# Patient Record
Sex: FEMALE | Race: ASIAN | NOT HISPANIC OR LATINO | URBAN - METROPOLITAN AREA
[De-identification: names, ages, dates, MRNs, and addresses within clinical notes are randomized per-mention and may not be internally consistent; named-entity substitution may affect disease eponyms.]

---

## 2020-08-11 ENCOUNTER — INPATIENT (INPATIENT)
Facility: HOSPITAL | Age: 60
LOS: 7 days | Discharge: HOME | DRG: 501 | End: 2020-08-19
Attending: INTERNAL MEDICINE | Admitting: INTERNAL MEDICINE
Payer: COMMERCIAL

## 2020-08-11 VITALS
HEIGHT: 62 IN | DIASTOLIC BLOOD PRESSURE: 92 MMHG | TEMPERATURE: 99 F | SYSTOLIC BLOOD PRESSURE: 134 MMHG | HEART RATE: 101 BPM | WEIGHT: 119.05 LBS | OXYGEN SATURATION: 95 % | RESPIRATION RATE: 18 BRPM

## 2020-08-11 DIAGNOSIS — M62.82 RHABDOMYOLYSIS: ICD-10-CM

## 2020-08-11 DIAGNOSIS — E13.9 OTHER SPECIFIED DIABETES MELLITUS WITHOUT COMPLICATIONS: ICD-10-CM

## 2020-08-11 DIAGNOSIS — E78.5 HYPERLIPIDEMIA, UNSPECIFIED: ICD-10-CM

## 2020-08-11 DIAGNOSIS — I10 ESSENTIAL (PRIMARY) HYPERTENSION: ICD-10-CM

## 2020-08-11 LAB
ANION GAP SERPL CALC-SCNC: 16 MMOL/L — SIGNIFICANT CHANGE UP (ref 5–17)
APPEARANCE UR: CLEAR — SIGNIFICANT CHANGE UP
APTT BLD: 30.8 SEC — SIGNIFICANT CHANGE UP (ref 27.5–35.5)
BASE EXCESS BLDV CALC-SCNC: 3.6 MMOL/L — HIGH (ref -2–2)
BASOPHILS # BLD AUTO: 0.02 K/UL — SIGNIFICANT CHANGE UP (ref 0–0.2)
BASOPHILS NFR BLD AUTO: 0.2 % — SIGNIFICANT CHANGE UP (ref 0–2)
BILIRUB UR-MCNC: NEGATIVE — SIGNIFICANT CHANGE UP
BUN SERPL-MCNC: 8 MG/DL — SIGNIFICANT CHANGE UP (ref 7–23)
CA-I SERPL-SCNC: 1.16 MMOL/L — SIGNIFICANT CHANGE UP (ref 1.12–1.3)
CALCIUM SERPL-MCNC: 9.5 MG/DL — SIGNIFICANT CHANGE UP (ref 8.4–10.5)
CHLORIDE BLDV-SCNC: 99 MMOL/L — SIGNIFICANT CHANGE UP (ref 96–108)
CHLORIDE SERPL-SCNC: 95 MMOL/L — LOW (ref 96–108)
CK MB BLD-MCNC: 1.7 % — SIGNIFICANT CHANGE UP (ref 0–3.5)
CK MB CFR SERPL CALC: 265.6 NG/ML — HIGH (ref 0–3.8)
CK SERPL-CCNC: CRITICAL HIGH U/L (ref 25–170)
CO2 BLDV-SCNC: 30 MMOL/L — SIGNIFICANT CHANGE UP (ref 22–30)
CO2 SERPL-SCNC: 26 MMOL/L — SIGNIFICANT CHANGE UP (ref 22–31)
COLOR SPEC: COLORLESS — SIGNIFICANT CHANGE UP
CREAT SERPL-MCNC: 0.32 MG/DL — LOW (ref 0.5–1.3)
CRP SERPL-MCNC: 3.47 MG/DL — HIGH (ref 0–0.4)
DIFF PNL FLD: ABNORMAL
EOSINOPHIL # BLD AUTO: 0.04 K/UL — SIGNIFICANT CHANGE UP (ref 0–0.5)
EOSINOPHIL NFR BLD AUTO: 0.5 % — SIGNIFICANT CHANGE UP (ref 0–6)
ERYTHROCYTE [SEDIMENTATION RATE] IN BLOOD: 86 MM/HR — HIGH (ref 0–20)
GAS PNL BLDV: 138 MMOL/L — SIGNIFICANT CHANGE UP (ref 135–145)
GAS PNL BLDV: SIGNIFICANT CHANGE UP
GLUCOSE BLDC GLUCOMTR-MCNC: 139 MG/DL — HIGH (ref 70–99)
GLUCOSE BLDV-MCNC: 157 MG/DL — HIGH (ref 70–99)
GLUCOSE SERPL-MCNC: 159 MG/DL — HIGH (ref 70–99)
GLUCOSE UR QL: NEGATIVE — SIGNIFICANT CHANGE UP
HCO3 BLDV-SCNC: 28 MMOL/L — SIGNIFICANT CHANGE UP (ref 21–29)
HCT VFR BLD CALC: 41.3 % — SIGNIFICANT CHANGE UP (ref 34.5–45)
HCT VFR BLDA CALC: 46 % — SIGNIFICANT CHANGE UP (ref 39–50)
HGB BLD CALC-MCNC: 14.8 G/DL — SIGNIFICANT CHANGE UP (ref 11.5–15.5)
HGB BLD-MCNC: 13.6 G/DL — SIGNIFICANT CHANGE UP (ref 11.5–15.5)
IMM GRANULOCYTES NFR BLD AUTO: 0.2 % — SIGNIFICANT CHANGE UP (ref 0–1.5)
INR BLD: 0.97 RATIO — SIGNIFICANT CHANGE UP (ref 0.88–1.16)
KETONES UR-MCNC: SIGNIFICANT CHANGE UP
LACTATE BLDV-MCNC: 1.9 MMOL/L — SIGNIFICANT CHANGE UP (ref 0.7–2)
LEUKOCYTE ESTERASE UR-ACNC: NEGATIVE — SIGNIFICANT CHANGE UP
LYMPHOCYTES # BLD AUTO: 1.03 K/UL — SIGNIFICANT CHANGE UP (ref 1–3.3)
LYMPHOCYTES # BLD AUTO: 12 % — LOW (ref 13–44)
MAGNESIUM SERPL-MCNC: 2.3 MG/DL — SIGNIFICANT CHANGE UP (ref 1.6–2.6)
MCHC RBC-ENTMCNC: 29.9 PG — SIGNIFICANT CHANGE UP (ref 27–34)
MCHC RBC-ENTMCNC: 32.9 GM/DL — SIGNIFICANT CHANGE UP (ref 32–36)
MCV RBC AUTO: 90.8 FL — SIGNIFICANT CHANGE UP (ref 80–100)
MONOCYTES # BLD AUTO: 0.14 K/UL — SIGNIFICANT CHANGE UP (ref 0–0.9)
MONOCYTES NFR BLD AUTO: 1.6 % — LOW (ref 2–14)
NEUTROPHILS # BLD AUTO: 7.34 K/UL — SIGNIFICANT CHANGE UP (ref 1.8–7.4)
NEUTROPHILS NFR BLD AUTO: 85.5 % — HIGH (ref 43–77)
NITRITE UR-MCNC: NEGATIVE — SIGNIFICANT CHANGE UP
NRBC # BLD: 0 /100 WBCS — SIGNIFICANT CHANGE UP (ref 0–0)
PCO2 BLDV: 45 MMHG — SIGNIFICANT CHANGE UP (ref 35–50)
PH BLDV: 7.42 — SIGNIFICANT CHANGE UP (ref 7.35–7.45)
PH UR: 6.5 — SIGNIFICANT CHANGE UP (ref 5–8)
PHOSPHATE SERPL-MCNC: 3.2 MG/DL — SIGNIFICANT CHANGE UP (ref 2.5–4.5)
PLATELET # BLD AUTO: 406 K/UL — HIGH (ref 150–400)
PO2 BLDV: 46 MMHG — HIGH (ref 25–45)
POTASSIUM BLDV-SCNC: 3.8 MMOL/L — SIGNIFICANT CHANGE UP (ref 3.5–5.3)
POTASSIUM SERPL-MCNC: 3.7 MMOL/L — SIGNIFICANT CHANGE UP (ref 3.5–5.3)
POTASSIUM SERPL-SCNC: 3.7 MMOL/L — SIGNIFICANT CHANGE UP (ref 3.5–5.3)
PROT UR-MCNC: NEGATIVE — SIGNIFICANT CHANGE UP
PROTHROM AB SERPL-ACNC: 11.6 SEC — SIGNIFICANT CHANGE UP (ref 10.6–13.6)
RBC # BLD: 4.55 M/UL — SIGNIFICANT CHANGE UP (ref 3.8–5.2)
RBC # FLD: 12.5 % — SIGNIFICANT CHANGE UP (ref 10.3–14.5)
SAO2 % BLDV: 81 % — SIGNIFICANT CHANGE UP (ref 67–88)
SARS-COV-2 RNA SPEC QL NAA+PROBE: SIGNIFICANT CHANGE UP
SODIUM SERPL-SCNC: 137 MMOL/L — SIGNIFICANT CHANGE UP (ref 135–145)
SP GR SPEC: 1.01 — LOW (ref 1.01–1.02)
UROBILINOGEN FLD QL: NEGATIVE — SIGNIFICANT CHANGE UP
WBC # BLD: 8.59 K/UL — SIGNIFICANT CHANGE UP (ref 3.8–10.5)
WBC # FLD AUTO: 8.59 K/UL — SIGNIFICANT CHANGE UP (ref 3.8–10.5)

## 2020-08-11 PROCEDURE — 99223 1ST HOSP IP/OBS HIGH 75: CPT | Mod: GC

## 2020-08-11 PROCEDURE — 99285 EMERGENCY DEPT VISIT HI MDM: CPT

## 2020-08-11 PROCEDURE — 71045 X-RAY EXAM CHEST 1 VIEW: CPT | Mod: 26

## 2020-08-11 RX ORDER — SODIUM CHLORIDE 9 MG/ML
1000 INJECTION INTRAMUSCULAR; INTRAVENOUS; SUBCUTANEOUS
Refills: 0 | Status: DISCONTINUED | OUTPATIENT
Start: 2020-08-11 | End: 2020-08-12

## 2020-08-11 RX ORDER — DEXTROSE 50 % IN WATER 50 %
15 SYRINGE (ML) INTRAVENOUS ONCE
Refills: 0 | Status: DISCONTINUED | OUTPATIENT
Start: 2020-08-11 | End: 2020-08-19

## 2020-08-11 RX ORDER — GLUCAGON INJECTION, SOLUTION 0.5 MG/.1ML
1 INJECTION, SOLUTION SUBCUTANEOUS ONCE
Refills: 0 | Status: DISCONTINUED | OUTPATIENT
Start: 2020-08-11 | End: 2020-08-19

## 2020-08-11 RX ORDER — SODIUM CHLORIDE 9 MG/ML
1000 INJECTION, SOLUTION INTRAVENOUS
Refills: 0 | Status: DISCONTINUED | OUTPATIENT
Start: 2020-08-11 | End: 2020-08-19

## 2020-08-11 RX ORDER — SODIUM CHLORIDE 9 MG/ML
1000 INJECTION INTRAMUSCULAR; INTRAVENOUS; SUBCUTANEOUS ONCE
Refills: 0 | Status: COMPLETED | OUTPATIENT
Start: 2020-08-11 | End: 2020-08-11

## 2020-08-11 RX ORDER — INSULIN LISPRO 100/ML
VIAL (ML) SUBCUTANEOUS
Refills: 0 | Status: DISCONTINUED | OUTPATIENT
Start: 2020-08-11 | End: 2020-08-18

## 2020-08-11 RX ORDER — SODIUM BICARBONATE 1 MEQ/ML
650 SYRINGE (ML) INTRAVENOUS
Refills: 0 | Status: COMPLETED | OUTPATIENT
Start: 2020-08-11 | End: 2020-08-13

## 2020-08-11 RX ORDER — DEXTROSE 50 % IN WATER 50 %
12.5 SYRINGE (ML) INTRAVENOUS ONCE
Refills: 0 | Status: DISCONTINUED | OUTPATIENT
Start: 2020-08-11 | End: 2020-08-19

## 2020-08-11 RX ORDER — TIMOLOL 0.5 %
1 DROPS OPHTHALMIC (EYE)
Qty: 0 | Refills: 0 | DISCHARGE

## 2020-08-11 RX ORDER — DEXTROSE 50 % IN WATER 50 %
25 SYRINGE (ML) INTRAVENOUS ONCE
Refills: 0 | Status: DISCONTINUED | OUTPATIENT
Start: 2020-08-11 | End: 2020-08-19

## 2020-08-11 RX ORDER — INSULIN LISPRO 100/ML
VIAL (ML) SUBCUTANEOUS AT BEDTIME
Refills: 0 | Status: DISCONTINUED | OUTPATIENT
Start: 2020-08-11 | End: 2020-08-18

## 2020-08-11 RX ORDER — TRAVOPROST 0.04 MG/ML
1 SOLUTION/ DROPS OPHTHALMIC
Qty: 0 | Refills: 0 | DISCHARGE

## 2020-08-11 RX ORDER — PANTOPRAZOLE SODIUM 20 MG/1
40 TABLET, DELAYED RELEASE ORAL
Refills: 0 | Status: DISCONTINUED | OUTPATIENT
Start: 2020-08-11 | End: 2020-08-19

## 2020-08-11 RX ADMIN — SODIUM CHLORIDE 1000 MILLILITER(S): 9 INJECTION INTRAMUSCULAR; INTRAVENOUS; SUBCUTANEOUS at 13:52

## 2020-08-11 RX ADMIN — SODIUM CHLORIDE 125 MILLILITER(S): 9 INJECTION INTRAMUSCULAR; INTRAVENOUS; SUBCUTANEOUS at 23:09

## 2020-08-11 NOTE — H&P ADULT - PROBLEM SELECTOR PLAN 1
IV fluids   seen by rheumatology  -likely combination of ( herbal remedy ( black fungus) and statin she is on   -will start sod bicab tab bid x 2 days

## 2020-08-11 NOTE — ED PROVIDER NOTE - ATTENDING CONTRIBUTION TO CARE
RGUJRAL 61yo f hx listed presents with proximal muscle weakness and fatigue x 1 month. Patient had outpt testing done and noted to have elevated CPK and liver enzymes. Denies any fever, chills. No cp/palp/sob/abd pain. No new meds. As per PMD pt has held her oral meds x 1 week.   on exam, Patient is awake, alert and oriented x 3.  Patient is well appearing and in no acute distress.  NCAT, PERRL, EOMI.  Neck is supple, No LAD.  Lungs are CTA B/L,+S1S2 no murmurs,  Abdomen:Soft nd/nt+bs no rebound or guarding.  Extremity no edema or calf tender.  Skin with no rash.  Neuro CN3-12 intact. Strength 5/5 in upper and lower extremities. Nml Sensation.Gait normal.   Labs reviewed, ddx myositis. Check labs, IVF and Rheum consult.

## 2020-08-11 NOTE — ED ADULT NURSE NOTE - OBJECTIVE STATEMENT
60 y female comes in sent in by PMD for elevated liver enzymes.  pt for couple of months has had general weakness her arms and legs.  Pt  able to ambulate but feels like collapsing Pt has not passed out or fallen.   Pt is followed by her MD and has been testing her blood liver enzymes cardiac enzymes.  Denies any other symptoms except being tired [facility] 60[facility] PM[facility] DM and HTN presenting with 1 month fatigue and proximal muscle weakness with elevated AST/ALT and CK. Pt with difficulty raising arms above head, hip flexion, standing from seated position, and exercise intolerance. Recent outpt labs obtained 7/29/20 notable for elevated CK to 16,000s and AST/ALT to 300s. Obtained repeat labs on 8/3/20 with consistent elevations in CK, AST/ALT, and liver U/S with increased hepatic echogenicity. Advised by PMD to come to [facility] for further evaluation. Denies muscle pain, red urine and dysuria, dry and thin hair, cold intolerance. Denies sick contacts, recent travel, smoking, drinking, drug use, herbal remedies. Denies fever, HA, dizziness, CP, SOB, abdominal pain, n/v, rashes, swelling. All other ROS negative at this time. IVL placed and pending further orderes susan

## 2020-08-11 NOTE — ED PROVIDER NOTE - PHYSICAL EXAMINATION
VITALS:   T(C): 37.2 (08-11-20 @ 12:19), Max: 37.2 (08-11-20 @ 12:19)  HR: 89 (08-11-20 @ 13:49) (89 - 101)  BP: 135/89 (08-11-20 @ 13:49) (134/92 - 135/89)  RR: 16 (08-11-20 @ 13:49) (16 - 18)  SpO2: 99% (08-11-20 @ 13:49) (95% - 99%)    GENERAL: NAD, lying in bed comfortably  HEAD:  Atraumatic, normocephalic  EYES: EOMI, PERRLA, conjunctiva and sclera clear  ENT: Moist mucous membranes  NECK: Supple, no JVD  HEART: Regular rate and rhythm, no murmurs, rubs, or gallops  LUNGS: Unlabored respirations.  Clear to auscultation bilaterally, no crackles, wheezing, or rhonchi  ABDOMEN: Soft, nontender, nondistended, no organomegaly, +BS  EXTREMITIES: 2+ peripheral pulses bilaterally. No clubbing, cyanosis, or edema  NERVOUS SYSTEM:  A&Ox3, strength 4/5 in b/l proximal UE and LE, 5/5 in b/l distal UE and LE  SKIN: No rashes or lesions

## 2020-08-11 NOTE — H&P ADULT - NSHPPHYSICALEXAM_GEN_ALL_CORE
pt. seen and examined, NAD    Vital Signs Last 24 Hrs  T(C): 36.8 (11 Aug 2020 20:20), Max: 37.2 (11 Aug 2020 12:19)  T(F): 98.3 (11 Aug 2020 20:20), Max: 98.9 (11 Aug 2020 12:19)  HR: 89 (11 Aug 2020 20:20) (85 - 101)  BP: 111/71 (11 Aug 2020 20:20) (111/71 - 137/83)  BP(mean): --  RR: 18 (11 Aug 2020 20:20) (15 - 18)  SpO2: 98% (11 Aug 2020 20:20) (95% - 99%)    heent: nc/at, no paloor  neck: supple, no JVD  lungs: b/l clear, no w/r/r  heart: s1s2 nml  abd: soft, NABS, NT/ND  ext: no e/c/c, pulses 2+  neuro: aaox3 , no focal deficit

## 2020-08-11 NOTE — ED PROVIDER NOTE - OBJECTIVE STATEMENT
60F PMH DM and HTN presenting with 1 month fatigue and proximal muscle weakness with elevated AST/ALT and CK. Pt with difficulty raising arms above head, hip flexion, standing from seated position, and exercise intolerance. Recent outpt labs obtained 7/29/20 notable for elevated CK to 16,000s and AST/ALT to 300s. Obtained repeat labs on 8/3/20 with consistent elevations in CK, AST/ALT, and liver U/S with increased hepatic echogenicity. Advised by PMD to come to [facility] for further evaluation. Denies muscle pain, red urine and dysuria, dry and thin hair, cold intolerance. Denies sick contacts, recent travel, smoking, drinking, drug use, herbal remedies. Denies fever, HA, dizziness, CP, SOB, abdominal pain, n/v, rashes, swelling. All other ROS negative at this time.

## 2020-08-11 NOTE — ED PROVIDER NOTE - NS ED ROS FT
General: (+) weakness, (+) fatigue, no change in wt  HEENT: No congestion, no blurry vision, no odynophagia, no rhinorrhea, no ear pain, no throat pain  Respiratory: No cough, no shortness of breath  Cardiac: No chest pain, no palpitations  GI: No abdominal pain, (+) chronic loose stools x3 months, no vomiting, no nausea, no constipation  : No dysuria, no hematuria  MSK: No swelling in extremities, no arthralgias, no back pain  Neuro: No headache, no dizziness

## 2020-08-11 NOTE — PATIENT PROFILE ADULT - VISION (WITH CORRECTIVE LENSES IF THE PATIENT USUALLY WEARS THEM):
Anxiety    Asthma    Bipolar 1 disorder  no therapy  COPD (chronic obstructive pulmonary disease)  emphysema  Depressed    Diverticulosis    Gall bladder disease    Hypertension    Kidney stone    Lipoma  left abdomen / hip  Lipoma of abdominal wall  left lateral Normal vision: sees adequately in most situations; can see medication labels, newsprint

## 2020-08-11 NOTE — H&P ADULT - HISTORY OF PRESENT ILLNESS
Ms. Vieira is a 61y/o F PMHx of T2DM, HLD, HTN, and glaucoma presenting with 1 month fatigue and proximal muscle weakness with elevated AST/ALT and CK. Pt endorses difficulty raising arms above head, difficulty raising her legs, difficulty standing from seated position, and exercise intolerance. Pt was evaluated by her PCP w/ o/p labs obtained 7/29/20 notable for elevated CK to 16,249 and /. Repeat labs on 8/3/20 with CK 16,190, /, and liver U/S with increased hepatic echogenicity. Advised by PCP to come to [facility] for further evaluation. Denies fever/chills, muscle pain, red urine/dysuria, dry and thin hair, or cold intolerance. Denies sick contacts, recent travel, smoking, drinking, drug use, herbal remedies. Denies HA, dizziness, CP, SOB, abdominal pain, n/v, rashes, swelling.     She does endorse eating boiled fungus/mushrooms from the supermarket ~4w/a. Also reports chronic internment epigastric pains including sensation of something stuck in her throat and/or stomach for which she had EGD in June. Endorses ~15 lb unintentional weightloss over the last ~3m.     she says she takes only one time metformin and not bid and alsoher BP is acually low , but her doctor started on losartan to protect her kidneys from diabetes
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
## 2020-08-11 NOTE — H&P ADULT - NSHPLABSRESULTS_GEN_ALL_CORE
13.6   8.59  )-----------( 406      ( 11 Aug 2020 13:59 )             41.3     08-11    137  |  95<L>  |  8   ----------------------------<  159<H>  3.7   |  26  |  0.32<L>    Ca    9.5      11 Aug 2020 13:59  Phos  3.2     08-11  Mg     2.3     08-11

## 2020-08-11 NOTE — PATIENT PROFILE ADULT - NSPROEDALEARNPREF_GEN_A_NUR
Question about rx pain medication intervals, medication prescribed by ED, unable to answer question within scope of practice, found a 24hr pharmacy close to caller and transferred caller to speak to 24hr pharmacist.    Reason for Disposition   Caller has medication question about med not prescribed by PCP and triager unable to answer question (e.g., compatibility with other med, storage)    Protocols used: MEDICATION QUESTION CALL-ADULT-
verbal instruction

## 2020-08-11 NOTE — ED ADULT NURSE NOTE - CHPI ED NUR SYMPTOMS NEG
no pain/no vomiting/no decreased eating/drinking/no dizziness/no fever/no nausea/no chills/no tingling

## 2020-08-11 NOTE — CONSULT NOTE ADULT - ASSESSMENT
59y/o F PMHx of T2DM, HLD, HTN, and glaucoma presenting with 1 month fatigue and proximal muscle weakness with elevated AST/ALT and CK. 61y/o F PMHx of T2DM, HLD, HTN, and glaucoma presenting with 1 month fatigue and proximal muscle weakness with elevated AST/ALT and CK. Rheumatology consulted for possible polymyositis  - ESR and UA currently pending  - Would obtain serologies for polymyositis/dermatomyositis: LILI, anti-Ro/SSA, anti-La/SSB, anti-ribonucleoprotein (RNP), DOROTHY/anti-Sm, and anti-Rosa-1   - Case to be discussed with attending    Lawrence Walters  Internal Medicine, PGY3  [facility] [phone]  [facility] [address] 61y/o F PMHx of T2DM, HLD, HTN, and glaucoma presenting with 1 month fatigue and proximal muscle weakness with elevated AST/ALT and CK. Rheumatology consulted for possible polymyositis  - ESR and UA currently pending  - Would obtain serologies for polymyositis/dermatomyositis: LILI, anti-Ro/SSA, anti-La/SSB, anti-ribonucleoprotein (RNP), DOROTHY/anti-Sm, and MyoMarker panel 3  - Case to be discussed with attending    Lawrence Walters  Internal Medicine, PGY3  [facility] [phone]  [facility] [address] 61y/o F PMHx of T2DM, HLD, HTN, and glaucoma presenting with 1 month fatigue and proximal muscle weakness with elevated AST/ALT and CK. Rheumatology consulted for possible polymyositis  - ESR currently pending, CRP 3.47, CK ~16k  - Would obtain serologies for polymyositis/dermatomyositis: LILI, anti-Ro/SSA, anti-La/SSB, anti-ribonucleoprotein (RNP), DOROTHY/anti-Sm, and MyoMarker panel 3  - Would obtain MRI of thigh muscles to evaluate for muscle inflammation/edema   - Case to be discussed with attending    Lawrence Walters  Internal Medicine, PGY3  [facility] [phone]  [facility] [address] 61y/o F PMHx of T2DM, HLD, HTN, and glaucoma presenting with 1 month fatigue and proximal muscle weakness with elevated AST/ALT and CK. 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   - Elevated CK concerning for statin induced myositis vs viral myositis vs polymyositis or dermatomyositis   - ESR currently pending, CRP 3.47, CK ~16k  - Would obtain serologies for polymyositis/dermatomyositis: LILI, anti-Ro/SSA, anti-La/SSB, anti-ribonucleoprotein (RNP), DOROTHY/anti-Sm, anti-Rosa-1, and MyoMarker panel 3  - Please obtain HMG-CoA reductase Ab  - Would obtain MRI of thigh muscles to evaluate for muscle inflammation/edema   - Case to be discussed with attending    Lawrence Walters  Internal Medicine, PGY3  [facility] [phone]  [facility] [address] 61y/o F PMHx of T2DM, HLD, HTN, and glaucoma presenting with 1 month fatigue and proximal muscle weakness with elevated AST/ALT and CK. 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   - Elevated CK concerning for statin induced myositis vs viral myositis vs polymyositis or dermatomyositis   - ESR currently pending, CRP 3.47, CK ~16k  - Would obtain serologies for polymyositis/dermatomyositis: LILI, anti-Ro/SSA, anti-La/SSB, anti-ribonucleoprotein (RNP), DOROTHY/anti-Sm, anti-Rosa-1, and MyoMarker panel 3  - Please obtain HMG-CoA reductase Ab  - Would obtain MRI of thigh muscles to evaluate for muscle inflammation/edema   - Given polymyositis association with paraneoplastic syndromes would check thyroid U/S and CT chest/abdomen/pelvis   - Case to be discussed with attending    Lawrence Walters  Internal Medicine, PGY3  [facility] [phone]  [facility] [address] 59y/o F PMHx of T2DM, HLD, HTN, and glaucoma presenting with 1 month fatigue and proximal muscle weakness with elevated AST/ALT and CK. 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   - Elevated CK concerning for statin induced myositis vs viral myositis vs polymyositis or dermatomyositis   - ESR currently pending, CRP 3.47, CK ~16k  - Would obtain serologies for polymyositis/dermatomyositis: LILI, C3, C4 anti-Ro/SSA, anti-La/SSB, anti-ribonucleoprotein (RNP), DOROTHY/anti-Sm, anti-Rosa-1, and MyoMarker panel 3  - Please obtain HMG-CoA reductase Ab  - Would obtain MRI of thigh muscles to evaluate for muscle inflammation/edema   - Given polymyositis association with paraneoplastic syndromes would check thyroid U/S and CT chest/abdomen/pelvis to r/o malignancy     Case to be discussed with Dr. Villeda  Rheumatology will follow  Lawrence Walters  Internal Medicine, PGY3  [facility] [phone]  [facility] [address]

## 2020-08-11 NOTE — ED PROVIDER NOTE - CLINICAL SUMMARY MEDICAL DECISION MAKING FREE TEXT BOX
60F PMH DM, HTN presenting with 1mo fatigue and recent elevated AST/ALT 300s and CK to 50194s, proximal muscle weakness, concerning for polymyositis vs rhabdomyolysis. Will obtain labs, coags, ESR/CRP, CKMB, Mg, Phos, UA CXR. Admit for further w/u of abnormal labs.

## 2020-08-12 ENCOUNTER — TRANSCRIPTION ENCOUNTER (OUTPATIENT)
Age: 60
End: 2020-08-12

## 2020-08-12 LAB
A1C WITH ESTIMATED AVERAGE GLUCOSE RESULT: 7 % — HIGH (ref 4–5.6)
ALBUMIN SERPL ELPH-MCNC: 3.7 G/DL — SIGNIFICANT CHANGE UP (ref 3.3–5)
ALP SERPL-CCNC: 76 U/L — SIGNIFICANT CHANGE UP (ref 40–120)
ALT FLD-CCNC: 305 U/L — HIGH (ref 10–45)
ANION GAP SERPL CALC-SCNC: 15 MMOL/L — SIGNIFICANT CHANGE UP (ref 5–17)
ANTI-RIBONUCLEAR PROTEIN: <0.2 AI — SIGNIFICANT CHANGE UP
AST SERPL-CCNC: 173 U/L — HIGH (ref 10–40)
BILIRUB SERPL-MCNC: 0.4 MG/DL — SIGNIFICANT CHANGE UP (ref 0.2–1.2)
BUN SERPL-MCNC: 8 MG/DL — SIGNIFICANT CHANGE UP (ref 7–23)
C3 SERPL-MCNC: 156 MG/DL — SIGNIFICANT CHANGE UP (ref 81–157)
C4 SERPL-MCNC: 34 MG/DL — SIGNIFICANT CHANGE UP (ref 13–39)
CALCIUM SERPL-MCNC: 9.4 MG/DL — SIGNIFICANT CHANGE UP (ref 8.4–10.5)
CHLORIDE SERPL-SCNC: 99 MMOL/L — SIGNIFICANT CHANGE UP (ref 96–108)
CHOLEST SERPL-MCNC: 228 MG/DL — HIGH (ref 10–199)
CK SERPL-CCNC: CRITICAL HIGH U/L (ref 25–170)
CO2 SERPL-SCNC: 27 MMOL/L — SIGNIFICANT CHANGE UP (ref 22–31)
CREAT SERPL-MCNC: 0.37 MG/DL — LOW (ref 0.5–1.3)
ENA SM AB FLD QL: <0.2 AI — SIGNIFICANT CHANGE UP
ESTIMATED AVERAGE GLUCOSE: 154 MG/DL — HIGH (ref 68–114)
GLUCOSE BLDC GLUCOMTR-MCNC: 117 MG/DL — HIGH (ref 70–99)
GLUCOSE BLDC GLUCOMTR-MCNC: 127 MG/DL — HIGH (ref 70–99)
GLUCOSE BLDC GLUCOMTR-MCNC: 137 MG/DL — HIGH (ref 70–99)
GLUCOSE BLDC GLUCOMTR-MCNC: 141 MG/DL — HIGH (ref 70–99)
GLUCOSE SERPL-MCNC: 132 MG/DL — HIGH (ref 70–99)
HCT VFR BLD CALC: 40.5 % — SIGNIFICANT CHANGE UP (ref 34.5–45)
HCV AB S/CO SERPL IA: 0.08 S/CO — SIGNIFICANT CHANGE UP (ref 0–0.99)
HCV AB SERPL-IMP: SIGNIFICANT CHANGE UP
HDLC SERPL-MCNC: 40 MG/DL — LOW
HGB BLD-MCNC: 12.7 G/DL — SIGNIFICANT CHANGE UP (ref 11.5–15.5)
LIPID PNL WITH DIRECT LDL SERPL: 159 MG/DL — HIGH
MCHC RBC-ENTMCNC: 29.5 PG — SIGNIFICANT CHANGE UP (ref 27–34)
MCHC RBC-ENTMCNC: 31.4 GM/DL — LOW (ref 32–36)
MCV RBC AUTO: 94.2 FL — SIGNIFICANT CHANGE UP (ref 80–100)
NRBC # BLD: 0 /100 WBCS — SIGNIFICANT CHANGE UP (ref 0–0)
PLATELET # BLD AUTO: 371 K/UL — SIGNIFICANT CHANGE UP (ref 150–400)
POTASSIUM SERPL-MCNC: 4.1 MMOL/L — SIGNIFICANT CHANGE UP (ref 3.5–5.3)
POTASSIUM SERPL-SCNC: 4.1 MMOL/L — SIGNIFICANT CHANGE UP (ref 3.5–5.3)
PROT SERPL-MCNC: 6.4 G/DL — SIGNIFICANT CHANGE UP (ref 6–8.3)
RAPID RVP RESULT: SIGNIFICANT CHANGE UP
RBC # BLD: 4.3 M/UL — SIGNIFICANT CHANGE UP (ref 3.8–5.2)
RBC # FLD: 12.7 % — SIGNIFICANT CHANGE UP (ref 10.3–14.5)
SODIUM SERPL-SCNC: 141 MMOL/L — SIGNIFICANT CHANGE UP (ref 135–145)
TOTAL CHOLESTEROL/HDL RATIO MEASUREMENT: 5.6 RATIO — SIGNIFICANT CHANGE UP (ref 3.3–7.1)
TRIGL SERPL-MCNC: 139 MG/DL — SIGNIFICANT CHANGE UP (ref 10–149)
WBC # BLD: 7.7 K/UL — SIGNIFICANT CHANGE UP (ref 3.8–10.5)
WBC # FLD AUTO: 7.7 K/UL — SIGNIFICANT CHANGE UP (ref 3.8–10.5)

## 2020-08-12 PROCEDURE — 99231 SBSQ HOSP IP/OBS SF/LOW 25: CPT

## 2020-08-12 PROCEDURE — 71260 CT THORAX DX C+: CPT | Mod: 26

## 2020-08-12 PROCEDURE — 74177 CT ABD & PELVIS W/CONTRAST: CPT | Mod: 26

## 2020-08-12 RX ORDER — SODIUM CHLORIDE 9 MG/ML
1000 INJECTION INTRAMUSCULAR; INTRAVENOUS; SUBCUTANEOUS
Refills: 0 | Status: DISCONTINUED | OUTPATIENT
Start: 2020-08-12 | End: 2020-08-13

## 2020-08-12 RX ADMIN — SODIUM CHLORIDE 75 MILLILITER(S): 9 INJECTION INTRAMUSCULAR; INTRAVENOUS; SUBCUTANEOUS at 12:12

## 2020-08-12 RX ADMIN — Medication 650 MILLIGRAM(S): at 05:35

## 2020-08-12 RX ADMIN — Medication 650 MILLIGRAM(S): at 17:02

## 2020-08-12 RX ADMIN — SODIUM CHLORIDE 125 MILLILITER(S): 9 INJECTION INTRAMUSCULAR; INTRAVENOUS; SUBCUTANEOUS at 05:35

## 2020-08-12 RX ADMIN — PANTOPRAZOLE SODIUM 40 MILLIGRAM(S): 20 TABLET, DELAYED RELEASE ORAL at 05:35

## 2020-08-12 NOTE — DISCHARGE NOTE PROVIDER - NSDCMRMEDTOKEN_GEN_ALL_CORE_FT
atorvastatin 10 mg oral tablet: 1 tab(s) orally once a day    NOTE: Patient havent taken for 1 week due to medical condition  losartan 25 mg oral tablet: 1 tab(s) orally once a day    NOTE: Patient havent taken for 1 week due to medical condition  metFORMIN 500 mg oral tablet, extended release: 1 tab(s) orally once a day    NOTE: RX SIG- 1 tab every 12 hours- patient havent taken for 1 week due to medical condition  timolol maleate 0.5% ophthalmic solution: 1 drop(s) to each affected eye once a day  travoprost 0.004% ophthalmic solution: 1 drop(s) to each affected eye once a day (in the evening) atorvastatin 10 mg oral tablet: 1 tab(s) orally once a day    NOTE: Patient havent taken for 1 week due to medical condition  Bactrim  mg-160 mg oral tablet: 1 tab(s) orally Monday, Wednesday, and Friday  losartan 25 mg oral tablet: 1 tab(s) orally once a day    NOTE: Patient havent taken for 1 week due to medical condition  metFORMIN 500 mg oral tablet, extended release: 1 tab(s) orally once a day    NOTE: RX SIG- 1 tab every 12 hours- patient havent taken for 1 week due to medical condition  pantoprazole 40 mg oral delayed release tablet: 1 tab(s) orally once a day (before a meal)  predniSONE 50 mg oral tablet: 1 tab(s) orally once a day  timolol maleate 0.5% ophthalmic solution: 1 drop(s) to each affected eye once a day  travoprost 0.004% ophthalmic solution: 1 drop(s) to each affected eye once a day (in the evening) Bactrim  mg-160 mg oral tablet: 1 tab(s) orally Monday, Wednesday, and Friday  metFORMIN 500 mg oral tablet, extended release: 1 tab(s) orally 2 times a day - 1 tab every 12 hours-  pantoprazole 40 mg oral delayed release tablet: 1 tab(s) orally once a day (before a meal)  predniSONE 10 mg oral tablet: 5 tab(s) orally once a day ;  follow up with rheumatology on tapering dose next week  timolol maleate 0.5% ophthalmic solution: 1 drop(s) to each affected eye once a day  travoprost 0.004% ophthalmic solution: 1 drop(s) to each affected eye once a day (in the evening)

## 2020-08-12 NOTE — DISCHARGE NOTE PROVIDER - CARE PROVIDER_API CALL
GLENNA PARADA  Internal Medicine  [address] [address] [address] [place] [address]  Phone: [phone]  Fax: [phone]  Follow Up Time: KARMAGLENNA REGALADO  Internal Medicine  [address] [place] [address]  Phone: [phone]  Fax: [phone]  Follow Up Time:     Luna Villeda (MD; MPH)  Internal Medicine; Rheumatology  [address], [address]  [place] [address]  Phone: [phone]  Fax: [phone]  Scheduled Appointment: 08/26/2020 KARMAGLENNA REGALADO  Internal Medicine  [address] [address] [address] [place] [address]  Phone: [phone]  Fax: [phone]  Follow Up Time:     Luna Villeda; MPH)  Internal Medicine; Rheumatology  [address], [address]  [place] [address]  Phone: [phone]  Fax: [phone]  Scheduled Appointment: 08/26/2020    Pranav Lowery  MEDICINE  [address] [place] [address]  Phone: [phone]  Fax: [phone]  Follow Up Time: 1 week

## 2020-08-12 NOTE — PROGRESS NOTE ADULT - PROBLEM SELECTOR PLAN 1
IV fluids   seen by rheumatology : Ct scan chest , abd/pelvis pending  -likely combination of ( herbal remedy ( black fungus) and statin she is on   -will start sod bicab tab bid x 2 days

## 2020-08-12 NOTE — DISCHARGE NOTE PROVIDER - CARE PROVIDERS DIRECT ADDRESSES
,DirectAddress_Unknown ,DirectAddress_Unknown,[email or web address] ,DirectAddress_Unknown,[email or web address]

## 2020-08-12 NOTE — DISCHARGE NOTE PROVIDER - PROVIDER TOKENS
PROVIDER:[TOKEN:[[id number]]] PROVIDER:[TOKEN:[[id number]]],PROVIDER:[TOKEN:[[id number]],SCHEDULEDAPPT:[08/26/2020]] PROVIDER:[TOKEN:[[id number]]],PROVIDER:[TOKEN:[[id number]],SCHEDULEDAPPT:[08/26/2020]],PROVIDER:[TOKEN:[[id number]],FOLLOWUP:[1 week]]

## 2020-08-12 NOTE — CONSULT NOTE ADULT - SUBJECTIVE AND OBJECTIVE BOX
CARDIOLOGY CONSULT - Dr. Cosme     CHIEF COMPLAINT: elevated CK     HPI:  61y/o F PMHx of T2DM, HLD, HTN, and glaucoma presenting with 1 month fatigue and proximal muscle weakness with elevated AST/ALT and CK. Pt endorses difficulty raising arms above head, difficulty raising her legs, difficulty standing from seated position, and exercise intolerance. Pt was evaluated by her PCP w/ o/p labs obtained 7/29/20 notable for elevated CK to 16,249 and /. Repeat labs on 8/3/20 with CK 16,190, /, and liver U/S with increased hepatic echogenicity. Advised by PCP to come to [facility] for further evaluation. Denies fever/chills, muscle pain, red urine/dysuria, dry and thin hair, or cold intolerance. Denies sick contacts, recent travel, smoking, drinking, drug use, herbal remedies. Denies HA, dizziness, CP, SOB, abdominal pain, n/v, rashes, swelling.     Pt seen and examined, Patient denies any chest pain, dyspnea, palpitations, cough, syncope, edema, exertional symptoms, nausea, abdominal pain, fever, chills,  or rash.  Denies any history of CAD, MI, valve disease, cardiomyopathy, or congenital heart disease. Pt. still complains of weakness, IVF continued.     PAST MEDICAL & SURGICAL HISTORY:  No pertinent past medical history  No significant past surgical history          PREVIOUS DIAGNOSTIC TESTING:    [ ] Echocardiogram:   [ ]  Catheterization:  [ ] Stress Test:  	    MEDICATIONS:  MEDICATIONS  (STANDING):  dextrose 5%. 1000 milliLiter(s) (50 mL/Hr) IV Continuous <Continuous>  dextrose 50% Injectable 12.5 Gram(s) IV Push once  dextrose 50% Injectable 25 Gram(s) IV Push once  dextrose 50% Injectable 25 Gram(s) IV Push once  insulin lispro (HumaLOG) corrective regimen sliding scale   SubCutaneous three times a day before meals  insulin lispro (HumaLOG) corrective regimen sliding scale   SubCutaneous at bedtime  pantoprazole    Tablet 40 milliGRAM(s) Oral before breakfast  sodium bicarbonate 650 milliGRAM(s) Oral two times a day  sodium chloride 0.9%. 1000 milliLiter(s) (75 mL/Hr) IV Continuous <Continuous>      FAMILY HISTORY:  No pertinent family history in first degree relatives      SOCIAL HISTORY:    [ x] Non-smoker  [ ] Smoker  [ ] Alcohol    Allergies    No Known Allergies    Intolerances    	    REVIEW OF SYSTEMS:  CONSTITUTIONAL: No fever, weight loss, + fatigue  EYES: No eye pain, visual disturbances, or discharge  ENMT:  No difficulty hearing, tinnitus, vertigo; No sinus or throat pain  NECK: No pain or stiffness  RESPIRATORY: No cough, wheezing, chills or hemoptysis; No Shortness of Breath  CARDIOVASCULAR: No chest pain, palpitations, passing out, dizziness, or leg swelling  GASTROINTESTINAL: No abdominal or epigastric pain. No nausea, vomiting, or hematemesis; No diarrhea or constipation. No melena or hematochezia.  GENITOURINARY: No dysuria, frequency, hematuria, or incontinence  NEUROLOGICAL: No headaches, memory loss, loss of strength, numbness, or tremors  SKIN: No itching, burning, rashes, or lesions   	    [x ] All others negative	  [ ] Unable to obtain    PHYSICAL EXAM:  T(C): 36.8 (08-12-20 @ 10:01), Max: 37.2 (08-11-20 @ 12:19)  HR: 85 (08-12-20 @ 10:01) (75 - 101)  BP: 126/80 (08-12-20 @ 10:01) (111/71 - 137/83)  RR: 18 (08-12-20 @ 10:01) (15 - 18)  SpO2: 96% (08-12-20 @ 10:01) (95% - 99%)  Wt(kg): --  I&O's Summary    11 Aug 2020 07:01  -  12 Aug 2020 07:00  --------------------------------------------------------  IN: 1115 mL / OUT: 0 mL / NET: 1115 mL    12 Aug 2020 07:01  -  12 Aug 2020 12:07  --------------------------------------------------------  IN: 240 mL / OUT: 0 mL / NET: 240 mL        Appearance: Normal	  Psychiatry: A & O x 3, Mood & affect appropriate  HEENT:   Normal oral mucosa, PERRL, EOMI	  Lymphatic: No lymphadenopathy  Cardiovascular: Normal S1 S2,RRR, No JVD, No murmurs  Respiratory: Lungs clear to auscultation	  Gastrointestinal:  Soft, Non-tender, + BS	  Skin: No rashes, No ecchymoses, No cyanosis	  Neurologic: Non-focal  Extremities: Normal range of motion, No clubbing, cyanosis or edema  Vascular: Peripheral pulses palpable 2+ bilaterally    TELEMETRY: 	    ECG:  NSR 94bpm   RADIOLOGY: < from: Xray Chest 1 View- PORTABLE-Urgent (08.11.20 @ 15:40) >  IMPRESSION:  No acute cardiopulmonary findings.          < end of copied text >    OTHER: 	  	  LABS:	 	    CARDIAC MARKERS:                                  12.7   7.70  )-----------( 371      ( 12 Aug 2020 06:39 )             40.5     08-12    141  |  99  |  8   ----------------------------<  132<H>  4.1   |  27  |  0.37<L>    Ca    9.4      12 Aug 2020 06:39  Phos  3.2     08-11  Mg     2.3     08-11    TPro  6.4  /  Alb  3.7  /  TBili  0.4  /  DBili  x   /  AST  173<H>  /  ALT  305<H>  /  AlkPhos  76  08-12    PT/INR - ( 11 Aug 2020 13:59 )   PT: 11.6 sec;   INR: 0.97 ratio         PTT - ( 11 Aug 2020 13:59 )  PTT:30.8 sec  proBNP:   Lipid Profile:   HgA1c:   TSH:

## 2020-08-12 NOTE — DISCHARGE NOTE PROVIDER - HOSPITAL COURSE
61y/o F PMHx of T2DM, HLD, HTN, and glaucoma presenting with 1 month fatigue and proximal muscle weakness with elevated AST/ALT and CK admitted with Rhabdomyolysis.     Rhabdomyolysis, ck down trending, continue to trend;    seen by rheumatology; w/u in progress 59y/o F PMHx of T2DM, HLD, HTN, and glaucoma presenting with 1 month fatigue and proximal muscle weakness with elevated AST/ALT and CK admitted with Rhabdomyolysis.     Rhabdomyolysis, ck down trending, continue to trend with IVF;    seen by rheumatology; w/u in progress;     concerning for myositis. had IVIGx1 dose on 8/13/20; seen by surgery and had  left quadriceps biopsy on 8/18/20. follow up biopsy result    per surgery, Patient can remove dressing on 8/20 morning. Steri strips to remain in place for one week.    continue prednisone as per rheumatology and follow up appointment with rheumatology on 8/26/2; call office to confirm time and rheum will decide on tapering preddnisone dose 61y/o F PMHx of T2DM, HLD, HTN, and glaucoma presenting with 1 month fatigue and proximal muscle weakness with elevated AST/ALT and CK admitted with Rhabdomyolysis.     Rhabdomyolysis, ck down trending, continue to trend with IVF;    seen by rheumatology;     -MRI showing myositis and muscle edema    -thyroid US with bl small thyroid nodule , CT A/P unremarkable     . had IVIGx1 dose on 8/13/20; seen by surgery and had  left quadriceps biopsy on 8/18/20. follow up biopsy result    per surgery, Patient can remove dressing on 8/20 morning. Steri strips to remain in place for one week.    continue prednisone 50mg po daily and bactrim DS 1 tab three times a wee for pcp prophylaxis as per rheumatology and follow up appointment with rheumatology on 8/26/2; call office to confirm time and rheum will decide on tapering prednisone;    pt is cleared by md for discharge home . 59y/o F PMHx of T2DM, HLD, HTN, and glaucoma presenting with 1 month fatigue and proximal muscle weakness with elevated AST/ALT and CK admitted with Rhabdomyolysis.     Rhabdomyolysis, ck down trending, continue to trend with IVF;    seen by rheumatology;     -MRI showing myositis and muscle edema    -thyroid US with bl small thyroid nodule , CT A/P unremarkable     . had IVIG;  seen by surgery and had  left quadriceps biopsy on 8/18/20. follow up biopsy result    per surgery, Patient can remove dressing on 8/20 morning. Steri strips to remain in place for one week.    continue prednisone 50mg po daily and bactrim DS 1 tab three times a wee for pcp prophylaxis as per rheumatology and follow up appointment with rheumatology on 8/26/2; call office to confirm time and rheum will decide on tapering prednisone;    pt is cleared by md for discharge home .

## 2020-08-12 NOTE — CHART NOTE - NSCHARTNOTEFT_GEN_A_CORE
Called by RN to report  abnormal lab result from AM lab-CPK 13,353; on IVF for rhabdo; Rheum on board; w/u in progress  Patient is a 60y old  Female who presents with a chief complaint of proximal muscle weakness and elevated CK (11 Aug 2020 21:05)    HPI:  Ms. Vieira is a 61y/o F PMHx of T2DM, HLD, HTN, and glaucoma presenting with 1 month fatigue and proximal muscle weakness with elevated AST/ALT and CK. Pt endorses difficulty raising arms above head, difficulty raising her legs, difficulty standing from seated position, and exercise intolerance. Pt was evaluated by her PCP w/ o/p labs obtained 7/29/20 notable for elevated CK to 16,249 and /. Repeat labs on 8/3/20 with CK 16,190, /, and liver U/S with increased hepatic echogenicity. Advised by PCP to come to [facility] for further evaluation. Denies fever/chills, muscle pain, red urine/dysuria, dry and thin hair, or cold intolerance. Denies sick contacts, recent travel, smoking, drinking, drug use, herbal remedies. Denies HA, dizziness, CP, SOB, abdominal pain, n/v, rashes, swelling.     She does endorse eating boiled fungus/mushrooms from the supermarket ~4w/a. Also reports chronic internment epigastric pains including sensation of something stuck in her throat and/or stomach for which she had EGD in June. Endorses ~15 lb unintentional weightloss over the last ~3m.     she says she takes only one time metformin and not bid and alsoher BP is acually low , but her doctor started on losartan to protect her kidneys from diabetes (11 Aug 2020 21:05)    Vital Signs Last 24 Hrs  T(C): 36.7 (12 Aug 2020 05:30), Max: 37.2 (11 Aug 2020 12:19)  T(F): 98 (12 Aug 2020 05:30), Max: 98.9 (11 Aug 2020 12:19)  HR: 75 (12 Aug 2020 05:30) (75 - 101)  BP: 114/68 (12 Aug 2020 05:30) (111/71 - 137/83)  BP(mean): --  RR: 18 (12 Aug 2020 05:30) (15 - 18)  SpO2: 98% (12 Aug 2020 05:30) (95% - 99%)                        12.7   7.70  )-----------( 371      ( 12 Aug 2020 06:39 )             40.5     08-12    141  |  99  |  8   ----------------------------<  132<H>  4.1   |  27  |  0.37<L>    Ca    9.4      12 Aug 2020 06:39  Phos  3.2     08-11  Mg     2.3     08-11    TPro  6.4  /  Alb  3.7  /  TBili  0.4  /  DBili  x   /  AST  173<H>  /  ALT  305<H>  /  AlkPhos  76  08-12    PT/INR - ( 11 Aug 2020 13:59 )   PT: 11.6 sec;   INR: 0.97 ratio         PTT - ( 11 Aug 2020 13:59 )  PTT:30.8 sec        A/P Called by RN to report  abnormal lab result from AM lab-CPK 13,353; on IVF for rhabdo; Rheum on board; w/u in progress  pt feels weak. denies pain/SOB/CP  afebrile  Patient is a 60y old  Female who presents with a chief complaint of proximal muscle weakness and elevated CK (11 Aug 2020 21:05)    HPI:  Ms. Vieira is a 61y/o F PMHx of T2DM, HLD, HTN, and glaucoma presenting with 1 month fatigue and proximal muscle weakness with elevated AST/ALT and CK. Pt endorses difficulty raising arms above head, difficulty raising her legs, difficulty standing from seated position, and exercise intolerance. Pt was evaluated by her PCP w/ o/p labs obtained 7/29/20 notable for elevated CK to 16,249 and /. Repeat labs on 8/3/20 with CK 16,190, /, and liver U/S with increased hepatic echogenicity. Advised by PCP to come to [facility] for further evaluation. Denies fever/chills, muscle pain, red urine/dysuria, dry and thin hair, or cold intolerance. Denies sick contacts, recent travel, smoking, drinking, drug use, herbal remedies. Denies HA, dizziness, CP, SOB, abdominal pain, n/v, rashes, swelling.     She does endorse eating boiled fungus/mushrooms from the supermarket ~4w/a. Also reports chronic internment epigastric pains including sensation of something stuck in her throat and/or stomach for which she had EGD in June. Endorses ~15 lb unintentional weightloss over the last ~3m.     she says she takes only one time metformin and not bid and alsoher BP is acually low , but her doctor started on losartan to protect her kidneys from diabetes (11 Aug 2020 21:05)    Vital Signs Last 24 Hrs  T(C): 36.7 (12 Aug 2020 05:30), Max: 37.2 (11 Aug 2020 12:19)  T(F): 98 (12 Aug 2020 05:30), Max: 98.9 (11 Aug 2020 12:19)  HR: 75 (12 Aug 2020 05:30) (75 - 101)  BP: 114/68 (12 Aug 2020 05:30) (111/71 - 137/83)  BP(mean): --  RR: 18 (12 Aug 2020 05:30) (15 - 18)  SpO2: 98% (12 Aug 2020 05:30) (95% - 99%)                        12.7   7.70  )-----------( 371      ( 12 Aug 2020 06:39 )             40.5     08-12    141  |  99  |  8   ----------------------------<  132<H>  4.1   |  27  |  0.37<L>    Ca    9.4      12 Aug 2020 06:39  Phos  3.2     08-11  Mg     2.3     08-11    TPro  6.4  /  Alb  3.7  /  TBili  0.4  /  DBili  x   /  AST  173<H>  /  ALT  305<H>  /  AlkPhos  76  08-12    PT/INR - ( 11 Aug 2020 13:59 )   PT: 11.6 sec;   INR: 0.97 ratio         PTT - ( 11 Aug 2020 13:59 )  PTT:30.8 sec    pt is AxOx4  lungs-clear  CVS-S1S2 RRR  GI- abdomen soft, NT, bowel sounds present  Ext; able move all extremities; no edema    AP    61y/o F PMHx of T2DM, HLD, HTN, and glaucoma presenting with 1 month fatigue and proximal muscle weakness with elevated AST/ALT and CK admitted with Rhabdomyolysis.   ck down trending,;  called by RN pt with  abnormal lab test from AM lab-elevated CPK; on IVNS  d/w attending MD; continue IVF NS with 75ml per hour  f/u CPK/CMP  rheumatology on board and w/u in progress  updated pt plan of care.  Jody Mendoza(NP)  3 [facility], [phone] Called by RN to report  abnormal lab result from AM lab-CPK 13,353; on IVF for rhabdo; Rheum on board; w/u in progress  pt feels tired and weak denies pain/SOB/CP  afebrile  Patient is a 60y old  Female who presents with a chief complaint of proximal muscle weakness and elevated CK (11 Aug 2020 21:05)    HPI:  Ms. Vieira is a 61y/o F PMHx of T2DM, HLD, HTN, and glaucoma presenting with 1 month fatigue and proximal muscle weakness with elevated AST/ALT and CK. Pt endorses difficulty raising arms above head, difficulty raising her legs, difficulty standing from seated position, and exercise intolerance. Pt was evaluated by her PCP w/ o/p labs obtained 7/29/20 notable for elevated CK to 16,249 and /. Repeat labs on 8/3/20 with CK 16,190, /, and liver U/S with increased hepatic echogenicity. Advised by PCP to come to [facility] for further evaluation. Denies fever/chills, muscle pain, red urine/dysuria, dry and thin hair, or cold intolerance. Denies sick contacts, recent travel, smoking, drinking, drug use, herbal remedies. Denies HA, dizziness, CP, SOB, abdominal pain, n/v, rashes, swelling.     She does endorse eating boiled fungus/mushrooms from the supermarket ~4w/a. Also reports chronic internment epigastric pains including sensation of something stuck in her throat and/or stomach for which she had EGD in June. Endorses ~15 lb unintentional weight loss over the last ~3m.     she says she takes only one time metformin and not bid and also her BP is actually low , but her doctor started on losartan to protect her kidneys from diabetes (11 Aug 2020 21:05)    Vital Signs Last 24 Hrs  T(C): 36.7 (12 Aug 2020 05:30), Max: 37.2 (11 Aug 2020 12:19)  T(F): 98 (12 Aug 2020 05:30), Max: 98.9 (11 Aug 2020 12:19)  HR: 75 (12 Aug 2020 05:30) (75 - 101)  BP: 114/68 (12 Aug 2020 05:30) (111/71 - 137/83)  BP(mean): --  RR: 18 (12 Aug 2020 05:30) (15 - 18)  SpO2: 98% (12 Aug 2020 05:30) (95% - 99%)                        12.7   7.70  )-----------( 371      ( 12 Aug 2020 06:39 )             40.5     08-12    141  |  99  |  8   ----------------------------<  132<H>  4.1   |  27  |  0.37<L>    Ca    9.4      12 Aug 2020 06:39  Phos  3.2     08-11  Mg     2.3     08-11    TPro  6.4  /  Alb  3.7  /  TBili  0.4  /  DBili  x   /  AST  173<H>  /  ALT  305<H>  /  AlkPhos  76  08-12    PT/INR - ( 11 Aug 2020 13:59 )   PT: 11.6 sec;   INR: 0.97 ratio         PTT - ( 11 Aug 2020 13:59 )  PTT:30.8 sec    pt is AxOx4  lungs-clear  CVS-S1S2 RRR  GI- abdomen soft, NT, bowel sounds present  Ext; able move all extremities; no edema    AP    61y/o F PMHx of T2DM, HLD, HTN, and glaucoma presenting with 1 month fatigue and proximal muscle weakness with elevated AST/ALT and CK admitted with Rhabdomyolysis.   ck down trending,;  called by RN pt with  abnormal lab test from AM lab-elevated CPK; on IVNS  d/w attending MD; continue IVF NS with 75ml per hour  f/u CPK/CMP  rheumatology on board and w/u in progress  updated pt plan of care.  Jody Mendoza(NP)  3 [facility], [phone]

## 2020-08-12 NOTE — CONSULT NOTE ADULT - ASSESSMENT
59y/o F PMHx of T2DM, HLD, HTN, and glaucoma presenting with 1 month fatigue and proximal muscle weakness with elevated AST/ALT and CK admitted with Rhabdomyolysis.     1. Rhabdomyolysis  ck down trending, continue to trend  cv stable, no cp/sob, no evidence of acute ischemia on EKG   IVF, statin on hold    further w/u and management per Rheum     2. HTN   bp stable off meds     3. HLD   statin on hold in setting of rhabdomyolysis   daily CK, LFTs     dvt ppx

## 2020-08-12 NOTE — DISCHARGE NOTE PROVIDER - NSDCFUADDINST_GEN_ALL_CORE_FT
Patient will follow up with Dr. Villeda on 8/26/20 at [address] [facility], [address], in [place]. (Time to be communicated to patient).   - Discharge on Bactrim DS three times/weekly due to pt being on prolonged high dose steroids (for PCP prophylaxis). Patient will follow up with Dr. Villeda on 8/26/20 at [address] [facility], [address], in [place]. (call for Time confirmation.

## 2020-08-12 NOTE — DISCHARGE NOTE PROVIDER - NSDCCAREPROVSEEN_GEN_ALL_CORE_FT
Pranav Lowery  [facility] Medicine, Advance PracticeTeam  [facility] Rheumatology, Team  [facility] Surgery, Red

## 2020-08-13 DIAGNOSIS — M33.20 POLYMYOSITIS, ORGAN INVOLVEMENT UNSPECIFIED: ICD-10-CM

## 2020-08-13 LAB
ALBUMIN SERPL ELPH-MCNC: 4.1 G/DL — SIGNIFICANT CHANGE UP (ref 3.3–5)
ALP SERPL-CCNC: 81 U/L — SIGNIFICANT CHANGE UP (ref 40–120)
ALT FLD-CCNC: 342 U/L — HIGH (ref 10–45)
ANA TITR SER: NEGATIVE — SIGNIFICANT CHANGE UP
ANION GAP SERPL CALC-SCNC: 11 MMOL/L — SIGNIFICANT CHANGE UP (ref 5–17)
ANTI-RIBONUCLEAR PROTEIN: <0.2 AI — SIGNIFICANT CHANGE UP
AST SERPL-CCNC: 197 U/L — HIGH (ref 10–40)
BILIRUB SERPL-MCNC: 0.4 MG/DL — SIGNIFICANT CHANGE UP (ref 0.2–1.2)
BUN SERPL-MCNC: 6 MG/DL — LOW (ref 7–23)
CALCIUM SERPL-MCNC: 9.6 MG/DL — SIGNIFICANT CHANGE UP (ref 8.4–10.5)
CHLORIDE SERPL-SCNC: 99 MMOL/L — SIGNIFICANT CHANGE UP (ref 96–108)
CK SERPL-CCNC: CRITICAL HIGH U/L (ref 25–170)
CO2 SERPL-SCNC: 29 MMOL/L — SIGNIFICANT CHANGE UP (ref 22–31)
CREAT SERPL-MCNC: 0.37 MG/DL — LOW (ref 0.5–1.3)
DSDNA AB FLD-ACNC: <0.2 AI — SIGNIFICANT CHANGE UP
ENA JO1 AB SER-ACNC: <0.2 AI — SIGNIFICANT CHANGE UP
ENA SS-A AB FLD IA-ACNC: <0.2 AI — SIGNIFICANT CHANGE UP
GLUCOSE BLDC GLUCOMTR-MCNC: 136 MG/DL — HIGH (ref 70–99)
GLUCOSE BLDC GLUCOMTR-MCNC: 150 MG/DL — HIGH (ref 70–99)
GLUCOSE BLDC GLUCOMTR-MCNC: 163 MG/DL — HIGH (ref 70–99)
GLUCOSE BLDC GLUCOMTR-MCNC: 165 MG/DL — HIGH (ref 70–99)
GLUCOSE SERPL-MCNC: 157 MG/DL — HIGH (ref 70–99)
POTASSIUM SERPL-MCNC: 3.9 MMOL/L — SIGNIFICANT CHANGE UP (ref 3.5–5.3)
POTASSIUM SERPL-SCNC: 3.9 MMOL/L — SIGNIFICANT CHANGE UP (ref 3.5–5.3)
PROT SERPL-MCNC: 7.4 G/DL — SIGNIFICANT CHANGE UP (ref 6–8.3)
SODIUM SERPL-SCNC: 139 MMOL/L — SIGNIFICANT CHANGE UP (ref 135–145)

## 2020-08-13 PROCEDURE — 76536 US EXAM OF HEAD AND NECK: CPT | Mod: 26

## 2020-08-13 PROCEDURE — 73719 MRI LOWER EXTREMITY W/DYE: CPT | Mod: 26,50

## 2020-08-13 PROCEDURE — 99232 SBSQ HOSP IP/OBS MODERATE 35: CPT | Mod: GC

## 2020-08-13 PROCEDURE — 99231 SBSQ HOSP IP/OBS SF/LOW 25: CPT

## 2020-08-13 RX ORDER — DIPHENHYDRAMINE HCL 50 MG
50 CAPSULE ORAL ONCE
Refills: 0 | Status: DISCONTINUED | OUTPATIENT
Start: 2020-08-13 | End: 2020-08-13

## 2020-08-13 RX ORDER — SODIUM CHLORIDE 9 MG/ML
1000 INJECTION INTRAMUSCULAR; INTRAVENOUS; SUBCUTANEOUS
Refills: 0 | Status: DISCONTINUED | OUTPATIENT
Start: 2020-08-13 | End: 2020-08-14

## 2020-08-13 RX ORDER — DIPHENHYDRAMINE HCL 50 MG
50 CAPSULE ORAL ONCE
Refills: 0 | Status: DISCONTINUED | OUTPATIENT
Start: 2020-08-13 | End: 2020-08-14

## 2020-08-13 RX ORDER — IMMUNE GLOBULIN (HUMAN) 10 G/100ML
50 INJECTION INTRAVENOUS; SUBCUTANEOUS DAILY
Refills: 0 | Status: COMPLETED | OUTPATIENT
Start: 2020-08-13 | End: 2020-08-14

## 2020-08-13 RX ADMIN — PANTOPRAZOLE SODIUM 40 MILLIGRAM(S): 20 TABLET, DELAYED RELEASE ORAL at 06:07

## 2020-08-13 RX ADMIN — Medication 650 MILLIGRAM(S): at 06:07

## 2020-08-13 RX ADMIN — IMMUNE GLOBULIN (HUMAN) 125 GRAM(S): 10 INJECTION INTRAVENOUS; SUBCUTANEOUS at 17:43

## 2020-08-13 RX ADMIN — Medication 1: at 17:37

## 2020-08-13 RX ADMIN — SODIUM CHLORIDE 75 MILLILITER(S): 9 INJECTION INTRAMUSCULAR; INTRAVENOUS; SUBCUTANEOUS at 06:07

## 2020-08-13 RX ADMIN — SODIUM CHLORIDE 125 MILLILITER(S): 9 INJECTION INTRAMUSCULAR; INTRAVENOUS; SUBCUTANEOUS at 09:03

## 2020-08-13 RX ADMIN — Medication 650 MILLIGRAM(S): at 17:16

## 2020-08-13 NOTE — CHART NOTE - NSCHARTNOTEFT_GEN_A_CORE
called by RN to report pt with CPK 15,192 from AM lab  Patient is a 60y old  Female who presents with a chief complaint of proximal muscle weakness and elevated CK (12 Aug 2020 22:36)    HPI:  Ms. Vieira is a 59y/o F PMHx of T2DM, HLD, HTN, and glaucoma presenting with 1 month fatigue and proximal muscle weakness with elevated AST/ALT and CK. Pt endorses difficulty raising arms above head, difficulty raising her legs, difficulty standing from seated position, and exercise intolerance. Pt was evaluated by her PCP w/ o/p labs obtained 7/29/20 notable for elevated CK to 16,249 and /. Repeat labs on 8/3/20 with CK 16,190, /, and liver U/S with increased hepatic echogenicity. Advised by PCP to come to [facility] for further evaluation. Denies fever/chills, muscle pain, red urine/dysuria, dry and thin hair, or cold intolerance. Denies sick contacts, recent travel, smoking, drinking, drug use, herbal remedies. Denies HA, dizziness, CP, SOB, abdominal pain, n/v, rashes, swelling.     She does endorse eating boiled fungus/mushrooms from the supermarket ~4w/a. Also reports chronic internment epigastric pains including sensation of something stuck in her throat and/or stomach for which she had EGD in June. Endorses ~15 lb unintentional weightloss over the last ~3m.     she says she takes only one time metformin and not bid and alsoher BP is acually low , but her doctor started on losartan to protect her kidneys from diabetes (11 Aug 2020 21:05)    Vital Signs Last 24 Hrs  T(C): 36.8 (13 Aug 2020 12:15), Max: 36.9 (13 Aug 2020 04:53)  T(F): 98.3 (13 Aug 2020 12:15), Max: 98.5 (13 Aug 2020 04:53)  HR: 81 (13 Aug 2020 12:15) (66 - 81)  BP: 117/75 (13 Aug 2020 12:15) (110/74 - 125/74)  BP(mean): --  RR: 17 (13 Aug 2020 12:15) (16 - 18)  SpO2: 98% (13 Aug 2020 12:15) (97% - 98%)                        12.7   7.70  )-----------( 371      ( 12 Aug 2020 06:39 )             40.5     08-13    139  |  99  |  6<L>  ----------------------------<  157<H>  3.9   |  29  |  0.37<L>    Ca    9.6      13 Aug 2020 06:45    TPro  7.4  /  Alb  4.1  /  TBili  0.4  /  DBili  x   /  AST  197<H>  /  ALT  342<H>  /  AlkPhos  81  08-13        G called by RN to report pt with CPK 15,192 from AM lab; on IVF for rhabdo; Rheum on board; w/u in progress  pt feels tired and weak ;  denies pain/SOB/CP  afebrile    Patient is a 60y old  Female who presents with a chief complaint of proximal muscle weakness and elevated CK (12 Aug 2020 22:36)    HPI:  Ms. Vieira is a 59y/o F PMHx of T2DM, HLD, HTN, and glaucoma presenting with 1 month fatigue and proximal muscle weakness with elevated AST/ALT and CK. Pt endorses difficulty raising arms above head, difficulty raising her legs, difficulty standing from seated position, and exercise intolerance. Pt was evaluated by her PCP w/ o/p labs obtained 7/29/20 notable for elevated CK to 16,249 and /. Repeat labs on 8/3/20 with CK 16,190, /, and liver U/S with increased hepatic echogenicity. Advised by PCP to come to [facility] for further evaluation. Denies fever/chills, muscle pain, red urine/dysuria, dry and thin hair, or cold intolerance. Denies sick contacts, recent travel, smoking, drinking, drug use, herbal remedies. Denies HA, dizziness, CP, SOB, abdominal pain, n/v, rashes, swelling.     She does endorse eating boiled fungus/mushrooms from the supermarket ~4w/a. Also reports chronic internment epigastric pains including sensation of something stuck in her throat and/or stomach for which she had EGD in June. Endorses ~15 lb unintentional weightloss over the last ~3m.     she says she takes only one time metformin and not bid and alsoher BP is acually low , but her doctor started on losartan to protect her kidneys from diabetes (11 Aug 2020 21:05)    Vital Signs Last 24 Hrs  T(C): 36.8 (13 Aug 2020 12:15), Max: 36.9 (13 Aug 2020 04:53)  T(F): 98.3 (13 Aug 2020 12:15), Max: 98.5 (13 Aug 2020 04:53)  HR: 81 (13 Aug 2020 12:15) (66 - 81)  BP: 117/75 (13 Aug 2020 12:15) (110/74 - 125/74)  BP(mean): --  RR: 17 (13 Aug 2020 12:15) (16 - 18)  SpO2: 98% (13 Aug 2020 12:15) (97% - 98%)                        12.7   7.70  )-----------( 371      ( 12 Aug 2020 06:39 )             40.5     08-13    139  |  99  |  6<L>  ----------------------------<  157<H>  3.9   |  29  |  0.37<L>    Ca    9.6      13 Aug 2020 06:45    TPro  7.4  /  Alb  4.1  /  TBili  0.4  /  DBili  x   /  AST  197<H>  /  ALT  342<H>  /  AlkPhos  81  08-13    Pt is A xOx 4  lungs-clear  CVS-S1S2 RRR  GI- abdomen soft, NT, bowel sounds present  Ext; able move all extremities; no edema    AP    59y/o F PMHx of T2DM, HLD, HTN, and glaucoma presenting with 1 month fatigue and proximal muscle weakness with elevated AST/ALT and CK admitted with Rhabdomyolysis.   ck down trending,;  called by RN pt with  abnormal lab test from AM lab-elevated CPK; on IVNS  d/w attending MD; continue IVF NS with 125 ml per hour; renal consult; attending will call  f/u CPK/CMP  rheumatology on board and w/u in progress  updated pt plan of care.

## 2020-08-13 NOTE — PROGRESS NOTE ADULT - ASSESSMENT
61y/o F PMHx of T2DM, HLD, HTN, and glaucoma presenting with 1 month fatigue and proximal muscle weakness with elevated AST/ALT and CK admitted with Rhabdomyolysis.     1. Rhabdomyolysis  ck down trending, continue to trend  cv stable, no cp/sob, no evidence of acute ischemia on EKG   IVF, statin on hold    further w/u and management per Rheum     2. HTN   bp stable off meds     3. HLD   statin on hold in setting of rhabdomyolysis   daily CK, LFTs     dvt ppx 61y/o F PMHx of T2DM, HLD, HTN, and glaucoma presenting with 1 month fatigue and proximal muscle weakness with elevated AST/ALT and CK admitted with Rhabdomyolysis.     1. Rhabdomyolysis  -likely combination of ( herbal remedy ( black fungus) and stati  -ck elevated,  continue to trend  -cv stable, no cp/sob, no evidence of acute ischemia on EKG   -IVF, bicarb po, statin on hold    -MRI noted Symmetric nonspecific myositis of the bilateral pelvic and hamstring muscles with greatest involvement of the iliopsoas and proximal rectus femoris muscles.  -thyroid US with bl small thyroid nodule , CT A/P unremarkable   - Rheum/ med f/u     2. HTN   bp stable off meds     3. HLD   statin on hold in setting of rhabdomyolysis   daily CK, LFTs     dvt ppx 59y/o F PMHx of T2DM, HLD, HTN, and glaucoma presenting with 1 month fatigue and proximal muscle weakness with elevated AST/ALT and CK admitted with Rhabdomyolysis.     1. Rhabdomyolysis  -likely combination of ( herbal remedy ( black fungus) and statin  -ck elevated,  continue to trend  -cv stable, no cp/sob, no evidence of acute ischemia on EKG   -IVF, bicarb po, statin on hold    -MRI noted Symmetric nonspecific myositis of the bilateral pelvic and hamstring muscles with greatest involvement of the iliopsoas and proximal rectus femoris muscles.  -thyroid US with bl small thyroid nodule , CT A/P unremarkable   -Rheum/ med f/u     2. HTN   bp stable off meds     3. HLD   statin on hold in setting of rhabdomyolysis   daily CK, LFTs     dvt ppx

## 2020-08-13 NOTE — PROGRESS NOTE ADULT - PROBLEM SELECTOR PLAN 1
IV fluids   seen by rheumatology : Ct scan chest , abd/pelvis : shows possible myositis   -likely combination of ( herbal remedy ( black fungus) and statin she is on   -will start sod bicab tab bid x 2 days

## 2020-08-13 NOTE — PROGRESS NOTE ADULT - ASSESSMENT
59y/o F PMHx of T2DM, HLD, HTN, and glaucoma presenting with 1 month fatigue and proximal muscle weakness with elevated AST/ALT and CK. 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   - Statin induced myositis vs polymyositis/dermatomyositis   - ESR 86, CRP 3.47, CK ~16k  - LILI (-), RNP/Sm (-), C3/C4 WNL  - Followup pending Rosa-1, Myomarker panel and HMB-CoA reductase Ab  - MRI showing myositis and muscle edema  - Will treat empirically with prednisone 50mg PO daily (1mg/kg) and IVIG 50g x2 doses (2g/kg total)  - No plan for muscle biopsy at this time  - Will need outpatient Rheumatology followup as pt will likely require prolonged prednisone taper and followup of send out labs     Case discussed with Dr. Villeda  Rheumatology will follow  Lawrence Walters  Internal Medicine, PGY3  [facility] [phone]  [facility] [address] 59y/o F PMHx of T2DM, HLD, HTN, and glaucoma presenting with 1 month fatigue and proximal muscle weakness with elevated AST/ALT and CK. 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   - Statin induced myositis vs polymyositis/dermatomyositis   - ESR 86, CRP 3.47, CK ~16k  - LILI (-), RNP/Sm (-), C3/C4 WNL  - Followup pending Rosa-1, Myomarker panel and HMB-CoA reductase Ab  - MRI showing myositis and muscle edema  - Will treat empirically with prednisone 50mg PO daily (1mg/kg) and IVIG 50g x2 doses (2g/kg total)  - Please arrange for muscle biopsy   - Will need outpatient Rheumatology followup as pt will likely require prolonged prednisone taper and followup of send out labs     Case discussed with Dr. Villeda  Rheumatology will follow  Lawrence Walters  Internal Medicine, PGY3  [facility] [phone]  [facility] [id number]

## 2020-08-13 NOTE — PROGRESS NOTE ADULT - PROBLEM SELECTOR PLAN 5
seen by rheumatology   -elevated sed rate , CT shows myositis   -started on prednisone and IVIG   -will need muscle biopsy : call house surgical consult in am

## 2020-08-14 LAB
ALBUMIN SERPL ELPH-MCNC: 3.1 G/DL — LOW (ref 3.3–5)
ALP SERPL-CCNC: 63 U/L — SIGNIFICANT CHANGE UP (ref 40–120)
ALT FLD-CCNC: 269 U/L — HIGH (ref 10–45)
ANION GAP SERPL CALC-SCNC: 9 MMOL/L — SIGNIFICANT CHANGE UP (ref 5–17)
AST SERPL-CCNC: 169 U/L — HIGH (ref 10–40)
BASOPHILS # BLD AUTO: 0.02 K/UL — SIGNIFICANT CHANGE UP (ref 0–0.2)
BASOPHILS NFR BLD AUTO: 0.4 % — SIGNIFICANT CHANGE UP (ref 0–2)
BILIRUB SERPL-MCNC: 0.5 MG/DL — SIGNIFICANT CHANGE UP (ref 0.2–1.2)
BUN SERPL-MCNC: 8 MG/DL — SIGNIFICANT CHANGE UP (ref 7–23)
CALCIUM SERPL-MCNC: 9 MG/DL — SIGNIFICANT CHANGE UP (ref 8.4–10.5)
CHLORIDE SERPL-SCNC: 100 MMOL/L — SIGNIFICANT CHANGE UP (ref 96–108)
CK SERPL-CCNC: CRITICAL HIGH U/L (ref 25–170)
CO2 SERPL-SCNC: 26 MMOL/L — SIGNIFICANT CHANGE UP (ref 22–31)
CREAT SERPL-MCNC: 0.37 MG/DL — LOW (ref 0.5–1.3)
EOSINOPHIL # BLD AUTO: 0.03 K/UL — SIGNIFICANT CHANGE UP (ref 0–0.5)
EOSINOPHIL NFR BLD AUTO: 0.6 % — SIGNIFICANT CHANGE UP (ref 0–6)
GLUCOSE BLDC GLUCOMTR-MCNC: 149 MG/DL — HIGH (ref 70–99)
GLUCOSE BLDC GLUCOMTR-MCNC: 150 MG/DL — HIGH (ref 70–99)
GLUCOSE BLDC GLUCOMTR-MCNC: 175 MG/DL — HIGH (ref 70–99)
GLUCOSE BLDC GLUCOMTR-MCNC: 206 MG/DL — HIGH (ref 70–99)
GLUCOSE SERPL-MCNC: 138 MG/DL — HIGH (ref 70–99)
HCT VFR BLD CALC: 33.3 % — LOW (ref 34.5–45)
HGB BLD-MCNC: 10.8 G/DL — LOW (ref 11.5–15.5)
HMGCR ANTIBODY, IGG RESULT: >200 UNITS — HIGH (ref 0–19)
IMM GRANULOCYTES NFR BLD AUTO: 0.2 % — SIGNIFICANT CHANGE UP (ref 0–1.5)
LYMPHOCYTES # BLD AUTO: 0.45 K/UL — LOW (ref 1–3.3)
LYMPHOCYTES # BLD AUTO: 8.6 % — LOW (ref 13–44)
MCHC RBC-ENTMCNC: 30 PG — SIGNIFICANT CHANGE UP (ref 27–34)
MCHC RBC-ENTMCNC: 32.4 GM/DL — SIGNIFICANT CHANGE UP (ref 32–36)
MCV RBC AUTO: 92.5 FL — SIGNIFICANT CHANGE UP (ref 80–100)
MONOCYTES # BLD AUTO: 0.05 K/UL — SIGNIFICANT CHANGE UP (ref 0–0.9)
MONOCYTES NFR BLD AUTO: 1 % — LOW (ref 2–14)
NEUTROPHILS # BLD AUTO: 4.68 K/UL — SIGNIFICANT CHANGE UP (ref 1.8–7.4)
NEUTROPHILS NFR BLD AUTO: 89.2 % — HIGH (ref 43–77)
NRBC # BLD: 0 /100 WBCS — SIGNIFICANT CHANGE UP (ref 0–0)
PLATELET # BLD AUTO: 282 K/UL — SIGNIFICANT CHANGE UP (ref 150–400)
POTASSIUM SERPL-MCNC: 3.6 MMOL/L — SIGNIFICANT CHANGE UP (ref 3.5–5.3)
POTASSIUM SERPL-SCNC: 3.6 MMOL/L — SIGNIFICANT CHANGE UP (ref 3.5–5.3)
PROT SERPL-MCNC: 7.1 G/DL — SIGNIFICANT CHANGE UP (ref 6–8.3)
RBC # BLD: 3.6 M/UL — LOW (ref 3.8–5.2)
RBC # FLD: 12.8 % — SIGNIFICANT CHANGE UP (ref 10.3–14.5)
SODIUM SERPL-SCNC: 135 MMOL/L — SIGNIFICANT CHANGE UP (ref 135–145)
WBC # BLD: 5.24 K/UL — SIGNIFICANT CHANGE UP (ref 3.8–10.5)
WBC # FLD AUTO: 5.24 K/UL — SIGNIFICANT CHANGE UP (ref 3.8–10.5)

## 2020-08-14 PROCEDURE — 99232 SBSQ HOSP IP/OBS MODERATE 35: CPT | Mod: GC

## 2020-08-14 PROCEDURE — 93306 TTE W/DOPPLER COMPLETE: CPT | Mod: 26

## 2020-08-14 RX ORDER — TIMOLOL 0.5 %
1 DROPS OPHTHALMIC (EYE) DAILY
Refills: 0 | Status: DISCONTINUED | OUTPATIENT
Start: 2020-08-15 | End: 2020-08-19

## 2020-08-14 RX ORDER — SODIUM CHLORIDE 9 MG/ML
1000 INJECTION INTRAMUSCULAR; INTRAVENOUS; SUBCUTANEOUS
Refills: 0 | Status: DISCONTINUED | OUTPATIENT
Start: 2020-08-14 | End: 2020-08-15

## 2020-08-14 RX ORDER — ACETAMINOPHEN 500 MG
650 TABLET ORAL ONCE
Refills: 0 | Status: COMPLETED | OUTPATIENT
Start: 2020-08-14 | End: 2020-08-14

## 2020-08-14 RX ORDER — TIMOLOL 0.5 %
1 DROPS OPHTHALMIC (EYE) ONCE
Refills: 0 | Status: COMPLETED | OUTPATIENT
Start: 2020-08-14 | End: 2020-08-14

## 2020-08-14 RX ORDER — LATANOPROST 0.05 MG/ML
1 SOLUTION/ DROPS OPHTHALMIC; TOPICAL AT BEDTIME
Refills: 0 | Status: DISCONTINUED | OUTPATIENT
Start: 2020-08-14 | End: 2020-08-19

## 2020-08-14 RX ORDER — DIPHENHYDRAMINE HCL 50 MG
25 CAPSULE ORAL ONCE
Refills: 0 | Status: COMPLETED | OUTPATIENT
Start: 2020-08-14 | End: 2020-08-14

## 2020-08-14 RX ADMIN — SODIUM CHLORIDE 125 MILLILITER(S): 9 INJECTION INTRAMUSCULAR; INTRAVENOUS; SUBCUTANEOUS at 20:55

## 2020-08-14 RX ADMIN — IMMUNE GLOBULIN (HUMAN) 125 GRAM(S): 10 INJECTION INTRAVENOUS; SUBCUTANEOUS at 13:49

## 2020-08-14 RX ADMIN — SODIUM CHLORIDE 125 MILLILITER(S): 9 INJECTION INTRAMUSCULAR; INTRAVENOUS; SUBCUTANEOUS at 06:15

## 2020-08-14 RX ADMIN — PANTOPRAZOLE SODIUM 40 MILLIGRAM(S): 20 TABLET, DELAYED RELEASE ORAL at 06:11

## 2020-08-14 RX ADMIN — SODIUM CHLORIDE 125 MILLILITER(S): 9 INJECTION INTRAMUSCULAR; INTRAVENOUS; SUBCUTANEOUS at 13:33

## 2020-08-14 RX ADMIN — Medication 2: at 12:21

## 2020-08-14 RX ADMIN — LATANOPROST 1 DROP(S): 0.05 SOLUTION/ DROPS OPHTHALMIC; TOPICAL at 21:06

## 2020-08-14 RX ADMIN — Medication 50 MILLIGRAM(S): at 06:11

## 2020-08-14 RX ADMIN — Medication 1 DROP(S): at 18:58

## 2020-08-14 RX ADMIN — Medication 25 MILLIGRAM(S): at 13:32

## 2020-08-14 RX ADMIN — Medication 650 MILLIGRAM(S): at 13:32

## 2020-08-14 NOTE — PROVIDER CONTACT NOTE (OTHER) - ACTION/TREATMENT ORDERED:
NP aware. Per NP stop IVIG infusion and give stat dose of Benadryl. Order carried out. Will continue to monitor.

## 2020-08-14 NOTE — PROGRESS NOTE ADULT - PROBLEM SELECTOR PLAN 5
seen by rheumatology   -elevated sed rate , CT shows myositis   -started on prednisone and IVIG   -will need muscle biopsy : surgical consult called

## 2020-08-14 NOTE — CONSULT NOTE ADULT - ASSESSMENT
60 year old female with new onset proximal muscle weakness concerning for myositis. Surgery consulted for muscle biopsy    Plan:   - Plan for muscle biopsy next week  - Discussed with Dr Rodrigues     [facility] [phone]

## 2020-08-14 NOTE — CONSULT NOTE ADULT - SUBJECTIVE AND OBJECTIVE BOX
SURGERY CONSULT NOTE     HPI: 60 year old female with PMH of DM and HTN here with proximal muscle weakness, elevated SK, AST and ALT and thought to have myositis. Started IVIG with no change in symptoms so fr. Rheumatology requested a muscle biopsy.     PMHx: No pertinent past medical history    PSHx: No significant past surgical history    Medications (inpatient): dextrose 5%. 1000 milliLiter(s) IV Continuous <Continuous>  dextrose 50% Injectable 12.5 Gram(s) IV Push once  dextrose 50% Injectable 25 Gram(s) IV Push once  dextrose 50% Injectable 25 Gram(s) IV Push once  insulin lispro (HumaLOG) corrective regimen sliding scale   SubCutaneous three times a day before meals  insulin lispro (HumaLOG) corrective regimen sliding scale   SubCutaneous at bedtime  latanoprost 0.005% Ophthalmic Solution 1 Drop(s) Both EYES at bedtime  pantoprazole    Tablet 40 milliGRAM(s) Oral before breakfast  predniSONE   Tablet 50 milliGRAM(s) Oral daily  sodium chloride 0.9%. 1000 milliLiter(s) IV Continuous <Continuous>  timolol 0.5% Solution 1 Drop(s) Both EYES once    Medications (PRN):dextrose 40% Gel 15 Gram(s) Oral once PRN  glucagon  Injectable 1 milliGRAM(s) IntraMuscular once PRN    Allergies: No Known Allergies  (Intolerances: )  Social Hx:   Family Hx: No pertinent family history in first degree relatives      Physical Exam  T(C): 36.8  HR: 58 (58 - 101)  BP: 104/66 (101/65 - 165/81)  RR: 18 (17 - 18)  SpO2: 96% (95% - 99%)  Tmax: T(C): , Max: 37.7 (08-14-20 @ 05:10)    08-13-20  -  08-14-20  --------------------------------------------------------  IN:    IV PiggyBack: 500 mL    NSModularFluidAdult: 300 mL    Oral Fluid: 720 mL    sodium chloride 0.9%: 750 mL  Total IN: 2270 mL    OUT:  Total OUT: 0 mL    Total NET: 2270 mL      08-14-20  -  08-14-20  --------------------------------------------------------  IN:    Oral Fluid: 600 mL  Total IN: 600 mL    OUT:  Total OUT: 0 mL    Total NET: 600 mL        General: well developed, well nourished, NAD  Respiratory: airway patent, respirations unlabored  CVS: regular rate and rhythm  Abdomen: soft, nontender, nondistended  Extremities: no edema, unable to lift legs and arms bilaterally past 90 degrees  Skin: warm, dry, appropriate color    Labs:                        10.8   5.24  )-----------( 282      ( 14 Aug 2020 06:10 )             33.3       08-14    135  |  100  |  8   ----------------------------<  138<H>  3.6   |  26  |  0.37<L>    Ca    9.0      14 Aug 2020 06:10    TPro  7.1  /  Alb  3.1<L>  /  TBili  0.5  /  DBili  x   /  AST  169<H>  /  ALT  269<H>  /  AlkPhos  63  08-14            Imaging and other studies:

## 2020-08-14 NOTE — PROGRESS NOTE ADULT - ASSESSMENT
59y/o F PMHx of T2DM, HLD, HTN, and glaucoma presenting with 1 month fatigue and proximal muscle weakness with elevated AST/ALT and CK. 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 Myositis confirmed on MRI of lower extremities showing muscle edema.     - Statin induced myositis vs polymyositis/dermatomyositis   - ESR 86, CRP 3.47, CK ~16k  - LILI (-), RNP/Sm (-), C3/C4 WNL. Rosa-1 antibody negative.  -  Myomarker panel and HMB-CoA reductase Ab still pending  - continue prednisone 1 mg/kg (50 mg daily), started on 8/13. s/p IVIG 1g/kg completed on 8/14.   - Please consult general surgery for muscle biopsy ASAP prior to patient's discharge.    - Will need outpatient Rheumatology followup as pt will likely require prolonged prednisone taper and followup of send out labs.  - Patient will follow up with Dr. Villeda on 8/26/20 at [address] [facility], [address], in [place]. (Time to be communicated to patient).   - Discharge on Bactrim DS three times/weekly due to pt being on prolonged high dose steroids (for PCP prophylaxis).    Case discussed with Dr. Ronal Mensah MD PGY4  Rheumatology Fellow  Pager [phone] 61y/o F PMHx of T2DM, HLD, HTN, and glaucoma presenting with 1 month fatigue and proximal muscle weakness with elevated AST/ALT and CK. 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 Myositis confirmed on MRI of lower extremities showing muscle edema.     - Statin induced myositis vs polymyositis/dermatomyositis   - ESR 86, CRP 3.47, CK ~16k  - LILI (-), RNP/Sm (-), C3/C4 WNL. Rosa-1 antibody negative.  -  Myomarker panel and HMB-CoA reductase Ab still pending  - continue prednisone 1 mg/kg (50 mg daily), started on 8/13. s/p IVIG 1g/kg completed on 8/14.   - Recheck creatine kinase level.  - Please consult general surgery for muscle biopsy ASAP prior to patient's discharge.    - Will need outpatient Rheumatology followup as pt will likely require prolonged prednisone taper and followup of send out labs.  - Patient will follow up with Dr. Villeda on 8/26/20 at [address] [facility], [address], in [place]. (Time to be communicated to patient).   - Discharge on Bactrim DS three times/weekly due to pt being on prolonged high dose steroids (for PCP prophylaxis).    Case discussed with Dr. Ronal Mensah MD PGY4  Rheumatology Fellow  Pager [phone]

## 2020-08-14 NOTE — PROVIDER CONTACT NOTE (OTHER) - ACTION/TREATMENT ORDERED:
NP aware. NP recommended continue IVIG infusion at 120cc rate and monitor closely. Order carried out. Will continue to monitor.

## 2020-08-14 NOTE — PROGRESS NOTE ADULT - PROBLEM SELECTOR PLAN 1
IV fluids   seen by rheumatology : Ct scan chest , abd/pelvis : shows possible myositis   -likely combination of ( herbal remedy ( black fungus) and statin she is on

## 2020-08-14 NOTE — CONSULT NOTE ADULT - ATTENDING COMMENTS
chart reviewed  agree with note above  due to logistics of pathology department   muscle biopsy can only be done monday - thursday as scheduled morning OR procedure since specimen needs to be sent by messenger to [place] for processing   in light of booking office closed for weekend   will work on monday am scheduling for next week ?tues/wed/thurs per OR availability.  cont supportive care per med/rheum
Patient seen and examined, agree with the above assessment and plan by ITALIA Mcghee.  59y/o F PMHx of T2DM, HLD, HTN, and glaucoma presenting with 1 month fatigue and proximal muscle weakness with elevated AST/ALT and CK admitted with Rhabdomyolysis.  Possible interaction w statin use and mushroom intake vs myopathy  Hold statin   workup per medicine/rheum

## 2020-08-14 NOTE — PROGRESS NOTE ADULT - ASSESSMENT
59y/o F PMHx of T2DM, HLD, HTN, and glaucoma presenting with 1 month fatigue and proximal muscle weakness with elevated AST/ALT and CK admitted with Rhabdomyolysis.     1. Rhabdomyolysis  -likely combination of ( herbal remedy ( black fungus) and statin  -ck elevated,  continue to trend  -cv stable, no cp/sob, no evidence of acute ischemia on EKG   -IVF, bicarb po  -on PO prednisone, s/p IVIG x 2   -statin on hold    - MRI showing myositis and muscle edema  -thyroid US with bl small thyroid nodule , CT A/P unremarkable   -plan for muscle biopsy  -Rheum/ med f/u     2. HTN   bp stable off meds     3. HLD   statin on hold in setting of rhabdomyolysis   daily CK, LFTs     dvt ppx 59y/o F PMHx of T2DM, HLD, HTN, and glaucoma presenting with 1 month fatigue and proximal muscle weakness with elevated AST/ALT and CK admitted with Rhabdomyolysis.     1. Rhabdomyolysis, Myositis   -likely combination of ( herbal remedy ( black fungus) and statin  -ck elevated,  continue to trend  -cv stable, no cp/sob, no evidence of acute ischemia on EKG   -IVF, bicarb po  -on PO prednisone, s/p IVIG x 2   -statin on hold    -MRI showing myositis and muscle edema  -thyroid US with bl small thyroid nodule , CT A/P unremarkable   -plan for muscle biopsy  -Rheum/ med f/u     2. HTN   bp stable off meds     3. HLD   statin on hold in setting of rhabdomyolysis   daily CK, LFTs     dvt ppx

## 2020-08-14 NOTE — PROVIDER CONTACT NOTE (CRITICAL VALUE NOTIFICATION) - ACTION/TREATMENT ORDERED:
IV fluids in progress, Rheumatology on board, work up in progress, NP Reji aware.
Pt started back on IVF
NP aware. No new order received. Will continue to monitor.

## 2020-08-14 NOTE — CHART NOTE - NSCHARTNOTEFT_GEN_A_CORE
ISHAAN ARTHUR    Notified by RN patient is shaking last night when 1st transfusion with IVIG at 240cc/hr, but V/S stable, no fever, no c/o cp or sob.       Interventions taken     Hold transfusion IVIG for 1hr and Benadryl 50mg iv x1, and no more rigors.  C/W IVIG at 120cc/hr for the rest and pt tolerated well.  cont assess and monitor  f/u with am team.                  Kristi Yang ANP-BC  Spectralink #[id number]

## 2020-08-15 LAB
ALBUMIN SERPL ELPH-MCNC: 3.3 G/DL — SIGNIFICANT CHANGE UP (ref 3.3–5)
ALP SERPL-CCNC: 63 U/L — SIGNIFICANT CHANGE UP (ref 40–120)
ALT FLD-CCNC: 287 U/L — HIGH (ref 10–45)
ANION GAP SERPL CALC-SCNC: 9 MMOL/L — SIGNIFICANT CHANGE UP (ref 5–17)
AST SERPL-CCNC: 181 U/L — HIGH (ref 10–40)
BILIRUB SERPL-MCNC: 0.6 MG/DL — SIGNIFICANT CHANGE UP (ref 0.2–1.2)
BUN SERPL-MCNC: 11 MG/DL — SIGNIFICANT CHANGE UP (ref 7–23)
CALCIUM SERPL-MCNC: 8.8 MG/DL — SIGNIFICANT CHANGE UP (ref 8.4–10.5)
CHLORIDE SERPL-SCNC: 106 MMOL/L — SIGNIFICANT CHANGE UP (ref 96–108)
CK SERPL-CCNC: CRITICAL HIGH U/L (ref 25–170)
CO2 SERPL-SCNC: 26 MMOL/L — SIGNIFICANT CHANGE UP (ref 22–31)
CREAT SERPL-MCNC: 0.35 MG/DL — LOW (ref 0.5–1.3)
GLUCOSE BLDC GLUCOMTR-MCNC: 116 MG/DL — HIGH (ref 70–99)
GLUCOSE BLDC GLUCOMTR-MCNC: 175 MG/DL — HIGH (ref 70–99)
GLUCOSE BLDC GLUCOMTR-MCNC: 178 MG/DL — HIGH (ref 70–99)
GLUCOSE BLDC GLUCOMTR-MCNC: 179 MG/DL — HIGH (ref 70–99)
GLUCOSE SERPL-MCNC: 127 MG/DL — HIGH (ref 70–99)
HCT VFR BLD CALC: 34.6 % — SIGNIFICANT CHANGE UP (ref 34.5–45)
HGB BLD-MCNC: 11 G/DL — LOW (ref 11.5–15.5)
MCHC RBC-ENTMCNC: 29.9 PG — SIGNIFICANT CHANGE UP (ref 27–34)
MCHC RBC-ENTMCNC: 31.8 GM/DL — LOW (ref 32–36)
MCV RBC AUTO: 94 FL — SIGNIFICANT CHANGE UP (ref 80–100)
NRBC # BLD: 0 /100 WBCS — SIGNIFICANT CHANGE UP (ref 0–0)
PLATELET # BLD AUTO: 331 K/UL — SIGNIFICANT CHANGE UP (ref 150–400)
POTASSIUM SERPL-MCNC: 3.8 MMOL/L — SIGNIFICANT CHANGE UP (ref 3.5–5.3)
POTASSIUM SERPL-SCNC: 3.8 MMOL/L — SIGNIFICANT CHANGE UP (ref 3.5–5.3)
PROT SERPL-MCNC: 8.6 G/DL — HIGH (ref 6–8.3)
RBC # BLD: 3.68 M/UL — LOW (ref 3.8–5.2)
RBC # FLD: 13.2 % — SIGNIFICANT CHANGE UP (ref 10.3–14.5)
SODIUM SERPL-SCNC: 141 MMOL/L — SIGNIFICANT CHANGE UP (ref 135–145)
WBC # BLD: 3.63 K/UL — LOW (ref 3.8–10.5)
WBC # FLD AUTO: 3.63 K/UL — LOW (ref 3.8–10.5)

## 2020-08-15 RX ORDER — SODIUM CHLORIDE 9 MG/ML
1000 INJECTION INTRAMUSCULAR; INTRAVENOUS; SUBCUTANEOUS
Refills: 0 | Status: COMPLETED | OUTPATIENT
Start: 2020-08-15 | End: 2020-08-16

## 2020-08-15 RX ADMIN — PANTOPRAZOLE SODIUM 40 MILLIGRAM(S): 20 TABLET, DELAYED RELEASE ORAL at 05:10

## 2020-08-15 RX ADMIN — Medication 1: at 17:33

## 2020-08-15 RX ADMIN — Medication 1: at 12:33

## 2020-08-15 RX ADMIN — Medication 1 DROP(S): at 11:34

## 2020-08-15 RX ADMIN — LATANOPROST 1 DROP(S): 0.05 SOLUTION/ DROPS OPHTHALMIC; TOPICAL at 22:28

## 2020-08-15 RX ADMIN — Medication 1: at 09:13

## 2020-08-15 RX ADMIN — Medication 50 MILLIGRAM(S): at 05:10

## 2020-08-15 RX ADMIN — SODIUM CHLORIDE 125 MILLILITER(S): 9 INJECTION INTRAMUSCULAR; INTRAVENOUS; SUBCUTANEOUS at 22:28

## 2020-08-15 NOTE — CHART NOTE - NSCHARTNOTEFT_GEN_A_CORE
· Assessment	  60 year old female with new onset proximal muscle weakness concerning for myositis. Surgery consulted for muscle biopsy    Plan:   - Plan for muscle biopsy next week  - Discussed with Dr Rodrigues     [facility] [id number]    Attending Attestation:   chart reviewed  agree with note above  due to logistics of pathology department   muscle biopsy can only be done monday - thursday as scheduled morning OR procedure since specimen needs to be sent by messenger to [place] for processing   in light of booking office closed for weekend   will work on monday am scheduling for next week ?tues/wed/thurs per OR availability.  d/w pt and med team at bedside in detail   cont supportive care per med/rheum.     I was physically present for the key portions of the evaluation and management (E/M) service provided.  I agree with the above history, physical, and plan which I have reviewed and edited where appropriate.

## 2020-08-15 NOTE — PROGRESS NOTE ADULT - ASSESSMENT
61y/o F PMHx of T2DM, HLD, HTN, and glaucoma presenting with 1 month fatigue and proximal muscle weakness with elevated AST/ALT and CK admitted with Rhabdomyolysis.     1. Rhabdomyolysis, Myositis   -likely combination of herbal remedy ( black fungus) and statin  -ck elevated, continue to trend  -cv stable, no cp/sob, no evidence of acute ischemia on EKG   -on PO prednisone, s/p IVIG   -statin on hold    -MRI showing myositis and muscle edema  -thyroid US with bl small thyroid nodule , CT A/P unremarkable   -plan for muscle biopsy  -Rheum/ med f/u     2. HTN   bp stable off meds     3. HLD   statin on hold in setting of rhabdomyolysis   daily CK, LFTs     4. Echo with normal lv function, valve fxn    dvt ppx

## 2020-08-15 NOTE — PROGRESS NOTE ADULT - PROBLEM SELECTOR PLAN 5
seen by rheumatology   -elevated sed rate , CT shows myositis   -started on prednisone and IVIG   -will need muscle biopsy : surgical consult called: scheduled for next week

## 2020-08-16 LAB
ALBUMIN SERPL ELPH-MCNC: 3.7 G/DL — SIGNIFICANT CHANGE UP (ref 3.3–5)
ALP SERPL-CCNC: 66 U/L — SIGNIFICANT CHANGE UP (ref 40–120)
ALT FLD-CCNC: 305 U/L — HIGH (ref 10–45)
ANION GAP SERPL CALC-SCNC: 10 MMOL/L — SIGNIFICANT CHANGE UP (ref 5–17)
AST SERPL-CCNC: 179 U/L — HIGH (ref 10–40)
BILIRUB SERPL-MCNC: 1 MG/DL — SIGNIFICANT CHANGE UP (ref 0.2–1.2)
BUN SERPL-MCNC: 9 MG/DL — SIGNIFICANT CHANGE UP (ref 7–23)
CALCIUM SERPL-MCNC: 8.9 MG/DL — SIGNIFICANT CHANGE UP (ref 8.4–10.5)
CHLORIDE SERPL-SCNC: 104 MMOL/L — SIGNIFICANT CHANGE UP (ref 96–108)
CK SERPL-CCNC: 9138 U/L — HIGH (ref 25–170)
CO2 SERPL-SCNC: 27 MMOL/L — SIGNIFICANT CHANGE UP (ref 22–31)
CREAT SERPL-MCNC: 0.3 MG/DL — LOW (ref 0.5–1.3)
GLUCOSE BLDC GLUCOMTR-MCNC: 137 MG/DL — HIGH (ref 70–99)
GLUCOSE BLDC GLUCOMTR-MCNC: 163 MG/DL — HIGH (ref 70–99)
GLUCOSE BLDC GLUCOMTR-MCNC: 182 MG/DL — HIGH (ref 70–99)
GLUCOSE BLDC GLUCOMTR-MCNC: 209 MG/DL — HIGH (ref 70–99)
GLUCOSE SERPL-MCNC: 214 MG/DL — HIGH (ref 70–99)
HCT VFR BLD CALC: 35.5 % — SIGNIFICANT CHANGE UP (ref 34.5–45)
HGB BLD-MCNC: 11.8 G/DL — SIGNIFICANT CHANGE UP (ref 11.5–15.5)
MCHC RBC-ENTMCNC: 29.9 PG — SIGNIFICANT CHANGE UP (ref 27–34)
MCHC RBC-ENTMCNC: 33.2 GM/DL — SIGNIFICANT CHANGE UP (ref 32–36)
MCV RBC AUTO: 90.1 FL — SIGNIFICANT CHANGE UP (ref 80–100)
NRBC # BLD: 0 /100 WBCS — SIGNIFICANT CHANGE UP (ref 0–0)
PLATELET # BLD AUTO: 343 K/UL — SIGNIFICANT CHANGE UP (ref 150–400)
POTASSIUM SERPL-MCNC: 3.5 MMOL/L — SIGNIFICANT CHANGE UP (ref 3.5–5.3)
POTASSIUM SERPL-SCNC: 3.5 MMOL/L — SIGNIFICANT CHANGE UP (ref 3.5–5.3)
PROT SERPL-MCNC: 8.2 G/DL — SIGNIFICANT CHANGE UP (ref 6–8.3)
RBC # BLD: 3.94 M/UL — SIGNIFICANT CHANGE UP (ref 3.8–5.2)
RBC # FLD: 13 % — SIGNIFICANT CHANGE UP (ref 10.3–14.5)
SODIUM SERPL-SCNC: 141 MMOL/L — SIGNIFICANT CHANGE UP (ref 135–145)
WBC # BLD: 8.4 K/UL — SIGNIFICANT CHANGE UP (ref 3.8–10.5)
WBC # FLD AUTO: 8.4 K/UL — SIGNIFICANT CHANGE UP (ref 3.8–10.5)

## 2020-08-16 RX ADMIN — SODIUM CHLORIDE 125 MILLILITER(S): 9 INJECTION INTRAMUSCULAR; INTRAVENOUS; SUBCUTANEOUS at 06:42

## 2020-08-16 RX ADMIN — LATANOPROST 1 DROP(S): 0.05 SOLUTION/ DROPS OPHTHALMIC; TOPICAL at 22:32

## 2020-08-16 RX ADMIN — Medication 50 MILLIGRAM(S): at 06:42

## 2020-08-16 RX ADMIN — Medication 1: at 08:48

## 2020-08-16 RX ADMIN — Medication 2: at 13:15

## 2020-08-16 RX ADMIN — Medication 1 DROP(S): at 13:15

## 2020-08-16 RX ADMIN — PANTOPRAZOLE SODIUM 40 MILLIGRAM(S): 20 TABLET, DELAYED RELEASE ORAL at 06:42

## 2020-08-16 NOTE — CHART NOTE - NSCHARTNOTEFT_GEN_A_CORE
Assessment	  60 year old female with new onset proximal muscle weakness concerning for myositis. Surgery consulted for muscle biopsy    Plan:   - Plan for muscle biopsy next week  - Discussed with Dr Rodrigues     [facility] [phone]    Attending Attestation:   chart reviewed  agree with note above  due to logistics of pathology department   muscle biopsy can only be done monday - thursday as scheduled morning OR procedure since specimen needs to be sent by messenger to [place] for processing   in light of booking office closed for weekend   will work on monday am scheduling for next week ?tues/wed/thurs per OR availability.  d/w pt and med team at bedside in detail   cont supportive care per med/rheum/cv.     I was physically present for the key portions of the evaluation and management (E/M) service provided.  I agree with the above history, physical, and plan which I have reviewed and edited where appropriate.

## 2020-08-16 NOTE — PROGRESS NOTE ADULT - ASSESSMENT
59y/o F PMHx of T2DM, HLD, HTN, and glaucoma presenting with 1 month fatigue and proximal muscle weakness with elevated AST/ALT and CK admitted with Rhabdomyolysis.     1. Rhabdomyolysis, Myositis   -likely combination of herbal remedy ( black fungus) and statin  -ck elevated, continue to trend  -cv stable, no cp/sob, no evidence of acute ischemia on EKG   -on PO prednisone, s/p IVIG   -statin on hold    -MRI showing myositis and muscle edema  -thyroid US with bl small thyroid nodule , CT A/P unremarkable   -plan for muscle biopsy this week   -Rheum/ med f/u     2. HTN   bp stable off meds     3. HLD   statin on hold in setting of rhabdomyolysis   daily CK, LFTs     4. Echo with normal lv function, valve fxn    dvt ppx

## 2020-08-17 ENCOUNTER — TRANSCRIPTION ENCOUNTER (OUTPATIENT)
Age: 60
End: 2020-08-17

## 2020-08-17 LAB
ALBUMIN SERPL ELPH-MCNC: 3.5 G/DL — SIGNIFICANT CHANGE UP (ref 3.3–5)
ALP SERPL-CCNC: 58 U/L — SIGNIFICANT CHANGE UP (ref 40–120)
ALT FLD-CCNC: 280 U/L — HIGH (ref 10–45)
ANION GAP SERPL CALC-SCNC: 11 MMOL/L — SIGNIFICANT CHANGE UP (ref 5–17)
AST SERPL-CCNC: 182 U/L — HIGH (ref 10–40)
BILIRUB SERPL-MCNC: 1 MG/DL — SIGNIFICANT CHANGE UP (ref 0.2–1.2)
BUN SERPL-MCNC: 10 MG/DL — SIGNIFICANT CHANGE UP (ref 7–23)
CALCIUM SERPL-MCNC: 9 MG/DL — SIGNIFICANT CHANGE UP (ref 8.4–10.5)
CHLORIDE SERPL-SCNC: 102 MMOL/L — SIGNIFICANT CHANGE UP (ref 96–108)
CK SERPL-CCNC: 7806 U/L — HIGH (ref 25–170)
CO2 SERPL-SCNC: 26 MMOL/L — SIGNIFICANT CHANGE UP (ref 22–31)
CREAT SERPL-MCNC: 0.33 MG/DL — LOW (ref 0.5–1.3)
GLUCOSE BLDC GLUCOMTR-MCNC: 117 MG/DL — HIGH (ref 70–99)
GLUCOSE BLDC GLUCOMTR-MCNC: 139 MG/DL — HIGH (ref 70–99)
GLUCOSE BLDC GLUCOMTR-MCNC: 163 MG/DL — HIGH (ref 70–99)
GLUCOSE BLDC GLUCOMTR-MCNC: 188 MG/DL — HIGH (ref 70–99)
GLUCOSE SERPL-MCNC: 118 MG/DL — HIGH (ref 70–99)
HCT VFR BLD CALC: 31.8 % — LOW (ref 34.5–45)
HGB BLD-MCNC: 10.6 G/DL — LOW (ref 11.5–15.5)
MCHC RBC-ENTMCNC: 30.2 PG — SIGNIFICANT CHANGE UP (ref 27–34)
MCHC RBC-ENTMCNC: 33.3 GM/DL — SIGNIFICANT CHANGE UP (ref 32–36)
MCV RBC AUTO: 90.6 FL — SIGNIFICANT CHANGE UP (ref 80–100)
NRBC # BLD: 0 /100 WBCS — SIGNIFICANT CHANGE UP (ref 0–0)
PLATELET # BLD AUTO: 347 K/UL — SIGNIFICANT CHANGE UP (ref 150–400)
POTASSIUM SERPL-MCNC: 3.3 MMOL/L — LOW (ref 3.5–5.3)
POTASSIUM SERPL-SCNC: 3.3 MMOL/L — LOW (ref 3.5–5.3)
PROT SERPL-MCNC: 7.4 G/DL — SIGNIFICANT CHANGE UP (ref 6–8.3)
RBC # BLD: 3.51 M/UL — LOW (ref 3.8–5.2)
RBC # FLD: 12.8 % — SIGNIFICANT CHANGE UP (ref 10.3–14.5)
SODIUM SERPL-SCNC: 139 MMOL/L — SIGNIFICANT CHANGE UP (ref 135–145)
WBC # BLD: 5.6 K/UL — SIGNIFICANT CHANGE UP (ref 3.8–10.5)
WBC # FLD AUTO: 5.6 K/UL — SIGNIFICANT CHANGE UP (ref 3.8–10.5)

## 2020-08-17 RX ORDER — POTASSIUM CHLORIDE 20 MEQ
20 PACKET (EA) ORAL
Refills: 0 | Status: COMPLETED | OUTPATIENT
Start: 2020-08-17 | End: 2020-08-17

## 2020-08-17 RX ORDER — SODIUM CHLORIDE 9 MG/ML
1000 INJECTION INTRAMUSCULAR; INTRAVENOUS; SUBCUTANEOUS
Refills: 0 | Status: DISCONTINUED | OUTPATIENT
Start: 2020-08-17 | End: 2020-08-18

## 2020-08-17 RX ADMIN — Medication 50 MILLIGRAM(S): at 05:16

## 2020-08-17 RX ADMIN — Medication 1: at 08:48

## 2020-08-17 RX ADMIN — Medication 20 MILLIEQUIVALENT(S): at 17:16

## 2020-08-17 RX ADMIN — LATANOPROST 1 DROP(S): 0.05 SOLUTION/ DROPS OPHTHALMIC; TOPICAL at 22:00

## 2020-08-17 RX ADMIN — Medication 1: at 12:42

## 2020-08-17 RX ADMIN — Medication 1 DROP(S): at 12:43

## 2020-08-17 RX ADMIN — SODIUM CHLORIDE 125 MILLILITER(S): 9 INJECTION INTRAMUSCULAR; INTRAVENOUS; SUBCUTANEOUS at 00:18

## 2020-08-17 RX ADMIN — Medication 20 MILLIEQUIVALENT(S): at 12:54

## 2020-08-17 RX ADMIN — Medication 20 MILLIEQUIVALENT(S): at 14:52

## 2020-08-17 RX ADMIN — PANTOPRAZOLE SODIUM 40 MILLIGRAM(S): 20 TABLET, DELAYED RELEASE ORAL at 05:16

## 2020-08-17 RX ADMIN — SODIUM CHLORIDE 125 MILLILITER(S): 9 INJECTION INTRAMUSCULAR; INTRAVENOUS; SUBCUTANEOUS at 08:49

## 2020-08-17 NOTE — PROGRESS NOTE ADULT - ASSESSMENT
59y/o F PMHx of T2DM, HLD, HTN, and glaucoma presenting with 1 month fatigue and proximal muscle weakness with elevated AST/ALT and CK admitted with Rhabdomyolysis.     1. Rhabdomyolysis, Myositis   -likely combination of herbal remedy ( black fungus) and statin  -ck  remains elevated but improving, continue to trend  -cv stable, no cp/sob, no evidence of acute ischemia on EKG   -on PO prednisone, s/p IVIG   -statin on hold    -MRI showing myositis and muscle edema  -thyroid US with bl small thyroid nodule , CT A/P unremarkable   -plan for muscle biopsy this week   -Rheum/ med f/u     2. HTN   bp stable off meds     3. HLD   statin on hold in setting of rhabdomyolysis   daily CK, LFTs     4. LE edema  no decomp CHF, continue to monitor  consider dopplers if worsens   Echo with normal lv function, valve fxn    dvt ppx

## 2020-08-17 NOTE — CHART NOTE - NSCHARTNOTEFT_GEN_A_CORE
Assessment	  60 year old female with new onset proximal muscle weakness concerning for myositis. Surgery consulted for muscle biopsy    Plan:   - Plan for muscle biopsy this week  - Discussed with Dr Rodrigues     [facility] [id number]    Attending Attestation:   chart reviewed  agree with note above  due to logistics of pathology department   muscle biopsy can only be done monday - thursday as scheduled morning OR procedure since specimen needs to be sent by messenger to [place] for processing   in light of booking office was closed for weekend   muscle bx booked for thursday am per OR availability.  cont supportive care per med/rheum/cv.     I was physically present for the key portions of the evaluation and management (E/M) service provided.  I agree with the above history, physical, and plan which I have reviewed and edited where appropriate. Assessment	  60 year old female with new onset proximal muscle weakness concerning for myositis. Surgery consulted for muscle biopsy    Plan:   - Plan for muscle biopsy this week  - Discussed with Dr Rodrigues     Red [facility] [id number]    Attending Attestation:   chart reviewed  agree with note above  due to logistics of pathology department   muscle biopsy can only be done monday - thursday as scheduled morning OR procedure since specimen needs to be sent by messenger to [place] for processing   in light of booking office was closed for weekend   muscle bx booked for tuesday am per OR availability.  npo after mn.  cont supportive care per med/rheum/cv.     I was physically present for the key portions of the evaluation and management (E/M) service provided.  I agree with the above history, physical, and plan which I have reviewed and edited where appropriate.

## 2020-08-17 NOTE — CHART NOTE - NSCHARTNOTEFT_GEN_A_CORE
Surgery Preop Note    Diagnosis: muscle weakness  Procedure: left quadricep muscle biopsy  Surgeon: Dr. Rodrigues                          10.6   5.60  )-----------( 347      ( 17 Aug 2020 06:36 )             31.8     08-17    139  |  102  |  10  ----------------------------<  118<H>  3.3<L>   |  26  |  0.33<L>    Ca    9.0      17 Aug 2020 06:35    TPro  7.4  /  Alb  3.5  /  TBili  1.0  /  DBili  x   /  AST  182<H>  /  ALT  280<H>  /  AlkPhos  58  08-17      Assessment: 60 year old female with new onset proximal muscle weakness concerning for myositis. Surgery consulted for muscle biopsy. Plan for OR tomorrow for left quadricep muscle biopsy.    Preop Checklist:    [x]NPO after midnight  [x]IVF  [x]AM labs (CBC, BMP, coags)  []Type and Screen x2 - one stat now, 2nd in the morning  [x]COVID negative 8/11  [x]EKG - NSR 8/11  [x]Chest xray - clear lungs 8/11  [x]Pregnancy test - age 60, not needed  []Diabetes orders - please make ISS q6h when patient NPO at midnight  [x]Consent - in chart    Shree, PGY-1  [facility], [id number] Surgery Preop Note    Diagnosis: muscle weakness, r/o myositis  Procedure: left quadricep muscle biopsy  Surgeon: Dr. Rodrigues                          10.6   5.60  )-----------( 347      ( 17 Aug 2020 06:36 )             31.8     08-17    139  |  102  |  10  ----------------------------<  118<H>  3.3<L>   |  26  |  0.33<L>    Ca    9.0      17 Aug 2020 06:35    TPro  7.4  /  Alb  3.5  /  TBili  1.0  /  DBili  x   /  AST  182<H>  /  ALT  280<H>  /  AlkPhos  58  08-17      Assessment: 60 year old female with new onset proximal muscle weakness concerning for myositis. Surgery consulted for muscle biopsy. Plan for OR tomorrow for left quadricep muscle biopsy.    Preop Checklist:    [x]NPO after midnight  [x]IVF  [x]AM labs (CBC, BMP, coags)  []Type and Screen x2 - one stat now, 2nd in the morning  [x]COVID negative 8/11  [x]EKG - NSR 8/11  [x]Chest xray - clear lungs 8/11  [x]Pregnancy test - age 60, not needed  []Diabetes orders - please make ISS q6h when patient NPO at midnight  [x]Consent - in chart    Shree, PGY-1  [facility], [id number]

## 2020-08-17 NOTE — PROGRESS NOTE ADULT - PROBLEM SELECTOR PLAN 1
IV fluids , decrease to 50 cc/hr now   seen by rheumatology : Ct scan chest , abd/pelvis : shows possible myositis   -likely combination of ( herbal remedy ( black fungus) and statin she is on

## 2020-08-17 NOTE — DIETITIAN INITIAL EVALUATION ADULT. - REASON INDICATOR FOR ASSESSMENT
patient seen for length of stay day 7.  60F PMH DM and HTN presenting with 1 month fatigue and proximal muscle weakness with elevated AST/ALT and CK.  Dx--rhabdomyolysis patient seen for length of stay day 7.    60F PMH DM and HTN presenting with 1 month fatigue and proximal muscle weakness with elevated AST/ALT and CK.  Dx--rhabdomyolysis

## 2020-08-17 NOTE — DIETITIAN INITIAL EVALUATION ADULT. - OTHER INFO
patient just recently started monitoring her FS  is diabetic taking metformin at then began to have hyperglycemia. Recently pat noted her Fs was elevated 130-150mg/dL, and she began to eat less, smaller portions, less meat, in an effort to loose weight. Patient has lost weight as she desired from 130  lbs to 118lbs. Pt began to avoid fruits, limiting and cutting out bananas, oranges and other high K foods. Pt admitted with hypokalemia. Diet education provided on snacks to avoid further weight loss and addition of protein with snacks  for better glucose control. Pt was very grateful for diet education. all questions answered for pt satisfaction. Discussed addition of glucerna supplement 1x daily. Pt feels her appetite decreased when rhabdomyolysis began. Pt states her UBW was 130lbs  2-3 months ago. patient just recently started monitoring her FS  as a diabetic taking metformin at then began to have hyperglycemia. Recently patient  noted her FS was elevated 130-150mg/dL, and she began to eat less, smaller portions, less meat, in an effort to control glucose levels. Patient subsequently  has lost weight from 130  lbs to 118lbs. Pt began to avoid fruits, limiting and cutting out bananas, oranges and other high K foods. Pt admitted with hypokalemia. Diet education provided on adding snacks and including protein source to avoid further weight loss and promote better glucose control. Pt was very grateful for diet education. all questions answered for pt satisfaction. Discussed addition of glucerna supplement 1x daily. Pt feels her appetite decreased when rhabdomyolysis began. Pt states her UBW was 130lbs  2-3 months ago. patient also states her appetite has improved over past 2 days, now eating 75% of meals

## 2020-08-18 ENCOUNTER — RESULT REVIEW (OUTPATIENT)
Age: 60
End: 2020-08-18

## 2020-08-18 LAB
ALBUMIN SERPL ELPH-MCNC: 3.3 G/DL — SIGNIFICANT CHANGE UP (ref 3.3–5)
ALP SERPL-CCNC: 60 U/L — SIGNIFICANT CHANGE UP (ref 40–120)
ALT FLD-CCNC: 289 U/L — HIGH (ref 10–45)
ANION GAP SERPL CALC-SCNC: 11 MMOL/L — SIGNIFICANT CHANGE UP (ref 5–17)
ANION GAP SERPL CALC-SCNC: 11 MMOL/L — SIGNIFICANT CHANGE UP (ref 5–17)
APTT BLD: 26.9 SEC — LOW (ref 27.5–35.5)
AST SERPL-CCNC: 196 U/L — HIGH (ref 10–40)
BILIRUB SERPL-MCNC: 0.8 MG/DL — SIGNIFICANT CHANGE UP (ref 0.2–1.2)
BLD GP AB SCN SERPL QL: NEGATIVE — SIGNIFICANT CHANGE UP
BUN SERPL-MCNC: 12 MG/DL — SIGNIFICANT CHANGE UP (ref 7–23)
BUN SERPL-MCNC: 12 MG/DL — SIGNIFICANT CHANGE UP (ref 7–23)
CALCIUM SERPL-MCNC: 9 MG/DL — SIGNIFICANT CHANGE UP (ref 8.4–10.5)
CALCIUM SERPL-MCNC: 9.1 MG/DL — SIGNIFICANT CHANGE UP (ref 8.4–10.5)
CHLORIDE SERPL-SCNC: 103 MMOL/L — SIGNIFICANT CHANGE UP (ref 96–108)
CHLORIDE SERPL-SCNC: 104 MMOL/L — SIGNIFICANT CHANGE UP (ref 96–108)
CK SERPL-CCNC: 7310 U/L — HIGH (ref 25–170)
CO2 SERPL-SCNC: 25 MMOL/L — SIGNIFICANT CHANGE UP (ref 22–31)
CO2 SERPL-SCNC: 26 MMOL/L — SIGNIFICANT CHANGE UP (ref 22–31)
CREAT SERPL-MCNC: 0.32 MG/DL — LOW (ref 0.5–1.3)
CREAT SERPL-MCNC: 0.33 MG/DL — LOW (ref 0.5–1.3)
GLUCOSE BLDC GLUCOMTR-MCNC: 128 MG/DL — HIGH (ref 70–99)
GLUCOSE BLDC GLUCOMTR-MCNC: 160 MG/DL — HIGH (ref 70–99)
GLUCOSE BLDC GLUCOMTR-MCNC: 172 MG/DL — HIGH (ref 70–99)
GLUCOSE BLDC GLUCOMTR-MCNC: 198 MG/DL — HIGH (ref 70–99)
GLUCOSE SERPL-MCNC: 127 MG/DL — HIGH (ref 70–99)
GLUCOSE SERPL-MCNC: 128 MG/DL — HIGH (ref 70–99)
HCT VFR BLD CALC: 30.4 % — LOW (ref 34.5–45)
HGB BLD-MCNC: 10.2 G/DL — LOW (ref 11.5–15.5)
INR BLD: 0.93 RATIO — SIGNIFICANT CHANGE UP (ref 0.88–1.16)
MAGNESIUM SERPL-MCNC: 2.1 MG/DL — SIGNIFICANT CHANGE UP (ref 1.6–2.6)
MCHC RBC-ENTMCNC: 30.3 PG — SIGNIFICANT CHANGE UP (ref 27–34)
MCHC RBC-ENTMCNC: 33.6 GM/DL — SIGNIFICANT CHANGE UP (ref 32–36)
MCV RBC AUTO: 90.2 FL — SIGNIFICANT CHANGE UP (ref 80–100)
NRBC # BLD: 0 /100 WBCS — SIGNIFICANT CHANGE UP (ref 0–0)
PLATELET # BLD AUTO: 362 K/UL — SIGNIFICANT CHANGE UP (ref 150–400)
POTASSIUM SERPL-MCNC: 3.4 MMOL/L — LOW (ref 3.5–5.3)
POTASSIUM SERPL-MCNC: 3.5 MMOL/L — SIGNIFICANT CHANGE UP (ref 3.5–5.3)
POTASSIUM SERPL-SCNC: 3.4 MMOL/L — LOW (ref 3.5–5.3)
POTASSIUM SERPL-SCNC: 3.5 MMOL/L — SIGNIFICANT CHANGE UP (ref 3.5–5.3)
PROT SERPL-MCNC: 6.9 G/DL — SIGNIFICANT CHANGE UP (ref 6–8.3)
PROTHROM AB SERPL-ACNC: 11.1 SEC — SIGNIFICANT CHANGE UP (ref 10.6–13.6)
RBC # BLD: 3.37 M/UL — LOW (ref 3.8–5.2)
RBC # FLD: 12.9 % — SIGNIFICANT CHANGE UP (ref 10.3–14.5)
RH IG SCN BLD-IMP: POSITIVE — SIGNIFICANT CHANGE UP
SODIUM SERPL-SCNC: 140 MMOL/L — SIGNIFICANT CHANGE UP (ref 135–145)
SODIUM SERPL-SCNC: 140 MMOL/L — SIGNIFICANT CHANGE UP (ref 135–145)
WBC # BLD: 6.43 K/UL — SIGNIFICANT CHANGE UP (ref 3.8–10.5)
WBC # FLD AUTO: 6.43 K/UL — SIGNIFICANT CHANGE UP (ref 3.8–10.5)

## 2020-08-18 PROCEDURE — 88305 TISSUE EXAM BY PATHOLOGIST: CPT | Mod: 26

## 2020-08-18 RX ORDER — PANTOPRAZOLE SODIUM 20 MG/1
1 TABLET, DELAYED RELEASE ORAL
Qty: 0 | Refills: 0 | DISCHARGE
Start: 2020-08-18

## 2020-08-18 RX ORDER — SODIUM CHLORIDE 9 MG/ML
1000 INJECTION, SOLUTION INTRAVENOUS
Refills: 0 | Status: DISCONTINUED | OUTPATIENT
Start: 2020-08-18 | End: 2020-08-19

## 2020-08-18 RX ORDER — SODIUM CHLORIDE 9 MG/ML
1000 INJECTION, SOLUTION INTRAVENOUS
Refills: 0 | Status: DISCONTINUED | OUTPATIENT
Start: 2020-08-18 | End: 2020-08-18

## 2020-08-18 RX ORDER — HYDROMORPHONE HYDROCHLORIDE 2 MG/ML
0.25 INJECTION INTRAMUSCULAR; INTRAVENOUS; SUBCUTANEOUS
Refills: 0 | Status: DISCONTINUED | OUTPATIENT
Start: 2020-08-18 | End: 2020-08-18

## 2020-08-18 RX ORDER — SODIUM CHLORIDE 9 MG/ML
1000 INJECTION INTRAMUSCULAR; INTRAVENOUS; SUBCUTANEOUS
Refills: 0 | Status: DISCONTINUED | OUTPATIENT
Start: 2020-08-18 | End: 2020-08-18

## 2020-08-18 RX ORDER — POTASSIUM CHLORIDE 20 MEQ
40 PACKET (EA) ORAL ONCE
Refills: 0 | Status: COMPLETED | OUTPATIENT
Start: 2020-08-18 | End: 2020-08-18

## 2020-08-18 RX ORDER — INSULIN LISPRO 100/ML
VIAL (ML) SUBCUTANEOUS EVERY 6 HOURS
Refills: 0 | Status: DISCONTINUED | OUTPATIENT
Start: 2020-08-18 | End: 2020-08-19

## 2020-08-18 RX ADMIN — Medication 1 DROP(S): at 16:05

## 2020-08-18 RX ADMIN — SODIUM CHLORIDE 75 MILLILITER(S): 9 INJECTION, SOLUTION INTRAVENOUS at 16:06

## 2020-08-18 RX ADMIN — Medication 1: at 17:57

## 2020-08-18 RX ADMIN — Medication 50 MILLIGRAM(S): at 16:05

## 2020-08-18 RX ADMIN — LATANOPROST 1 DROP(S): 0.05 SOLUTION/ DROPS OPHTHALMIC; TOPICAL at 23:07

## 2020-08-18 RX ADMIN — PANTOPRAZOLE SODIUM 40 MILLIGRAM(S): 20 TABLET, DELAYED RELEASE ORAL at 05:39

## 2020-08-18 RX ADMIN — Medication 40 MILLIEQUIVALENT(S): at 17:59

## 2020-08-18 RX ADMIN — SODIUM CHLORIDE 75 MILLILITER(S): 9 INJECTION, SOLUTION INTRAVENOUS at 13:28

## 2020-08-18 NOTE — CHART NOTE - NSCHARTNOTEFT_GEN_A_CORE
POST-OPERATIVE NOTE    Patient is s/p left quadricep muscle biopsy.    Subjective:  Patient reports pain at the incisional site, controlled with medication. Denies chest pain, shortness of breath, nausea, vomiting.       Vital Signs Last 24 Hrs  T(C): 36.5 (18 Aug 2020 15:29), Max: 36.9 (18 Aug 2020 10:25)  T(F): 97.7 (18 Aug 2020 15:29), Max: 98.4 (18 Aug 2020 10:25)  HR: 82 (18 Aug 2020 15:29) (66 - 82)  BP: 143/83 (18 Aug 2020 15:29) (113/73 - 152/89)  BP(mean): 95 (18 Aug 2020 13:45) (86 - 95)  RR: 18 (18 Aug 2020 15:29) (16 - 19)  SpO2: 99% (18 Aug 2020 15:29) (96% - 100%)    I&O's Detail    17 Aug 2020 07:01  -  18 Aug 2020 07:00  --------------------------------------------------------  IN:    Oral Fluid: 780 mL    sodium chloride 0.9%: 300 mL    sodium chloride 0.9%: 2250 mL  Total IN: 3330 mL    OUT:  Total OUT: 0 mL    Total NET: 3330 mL      18 Aug 2020 07:01  -  18 Aug 2020 17:34  --------------------------------------------------------  IN:    lactated ringers.: 150 mL    Oral Fluid: 120 mL  Total IN: 270 mL    OUT:    Voided: 500 mL  Total OUT: 500 mL    Total NET: -230 mL          Physical Exam:  General: NAD, resting comfortably in bed  Pulmonary: Nonlabored breathing, no respiratory distress  Extremities: Left quadricep appropriately tender, incision c/d/i with overlying dressing in place      LABS:                        10.2   6.43  )-----------( 362      ( 18 Aug 2020 04:57 )             30.4     08-18    140  |  104  |  12  ----------------------------<  128<H>  3.5   |  25  |  0.33<L>    Ca    9.0      18 Aug 2020 04:57  Mg     2.1     08-18    TPro  6.9  /  Alb  3.3  /  TBili  0.8  /  DBili  x   /  AST  196<H>  /  ALT  289<H>  /  AlkPhos  60  08-18    PT/INR - ( 18 Aug 2020 04:57 )   PT: 11.1 sec;   INR: 0.93 ratio         PTT - ( 18 Aug 2020 04:57 )  PTT:26.9 sec      Assessment:  The patient is a 60y Female who is now several hours post-op from a left quadricep muscle biopsy. Patient is stable and recovering appropriately.    Plan:  - Pain control as needed  - Continue regular diet  - DVT ppx  - OOB and ambulating as tolerated  - F/u AM lab  - Dispo per primary team    Shree, PGY-1  [facility], [id number]

## 2020-08-18 NOTE — PROGRESS NOTE ADULT - ASSESSMENT
60 year old female with new onset proximal muscle weakness concerning for myositis. Surgery consulted for muscle biopsy    Plan:   - Plan for muscle biopsy this today   - NPO/IVF for procedure  - f/u am labs   - replete electrolytes prn   - Discussed with Dr Ravi Mata PGY2   [facility]   [id number]

## 2020-08-18 NOTE — PRE-OP CHECKLIST - 1.
pt refused to leave eyeglasses on unit-verbalized she wont be able to see where she is going.
Preop teaching done and emotional support provided to patient and family. Safety provided.

## 2020-08-18 NOTE — PROGRESS NOTE ADULT - ASSESSMENT
59y/o F PMHx of T2DM, HLD, HTN, and glaucoma presenting with 1 month fatigue and proximal muscle weakness with elevated AST/ALT and CK admitted with Rhabdomyolysis.     1. Rhabdomyolysis, Myositis   -likely combination of herbal remedy ( black fungus) and statin  -ck  remains elevated but improving, continue to trend  -cv stable, no cp/sob, no evidence of acute ischemia on EKG   -on PO prednisone, s/p IVIG   -statin on hold    -MRI showing myositis and muscle edema  -thyroid US with bl small thyroid nodule , CT A/P unremarkable   -s/p muscle biopsy this week   -Rheum/ med f/u     2. HTN   bp stable off meds     3. HLD   statin on hold in setting of rhabdomyolysis   daily CK, LFTs     4. LE edema  no decomp CHF, continue to monitor  consider dopplers if worsens   Echo with normal lv function, valve fxn    dvt ppx 59y/o F PMHx of T2DM, HLD, HTN, and glaucoma presenting with 1 month fatigue and proximal muscle weakness with elevated AST/ALT and CK admitted with Rhabdomyolysis.     1. Rhabdomyolysis, Myositis   -likely combination of herbal remedy ( black fungus) and statin  -ck  remains elevated but improving, continue to trend  -cv stable, no cp/sob, no evidence of acute ischemia on EKG   -on PO prednisone, s/p IVIG   -statin on hold    -MRI showing myositis and muscle edema  -thyroid US with bl small thyroid nodule , CT A/P unremarkable   -s/p muscle biopsy   -Rheum/ med f/u     2. HTN   bp stable off meds     3. HLD   statin on hold in setting of rhabdomyolysis   daily CK, LFTs     4. LE edema  no decomp CHF, continue to monitor  consider dopplers if worsens   Echo with normal lv function, valve fxn    dvt ppx

## 2020-08-18 NOTE — PRE-ANESTHESIA EVALUATION ADULT - NSANTHOSAYNRD_GEN_A_CORE
No. MAICOL screening performed.  STOP BANG Legend: 0-2 = LOW Risk; 3-4 = INTERMEDIATE Risk; 5-8 = HIGH Risk

## 2020-08-19 ENCOUNTER — TRANSCRIPTION ENCOUNTER (OUTPATIENT)
Age: 60
End: 2020-08-19

## 2020-08-19 VITALS
RESPIRATION RATE: 16 BRPM | HEART RATE: 67 BPM | TEMPERATURE: 98 F | SYSTOLIC BLOOD PRESSURE: 101 MMHG | OXYGEN SATURATION: 100 % | DIASTOLIC BLOOD PRESSURE: 68 MMHG

## 2020-08-19 DIAGNOSIS — E11.9 TYPE 2 DIABETES MELLITUS WITHOUT COMPLICATIONS: ICD-10-CM

## 2020-08-19 LAB
ANION GAP SERPL CALC-SCNC: 13 MMOL/L — SIGNIFICANT CHANGE UP (ref 5–17)
BASOPHILS # BLD AUTO: 0.01 K/UL — SIGNIFICANT CHANGE UP (ref 0–0.2)
BASOPHILS NFR BLD AUTO: 0.1 % — SIGNIFICANT CHANGE UP (ref 0–2)
BUN SERPL-MCNC: 16 MG/DL — SIGNIFICANT CHANGE UP (ref 7–23)
CALCIUM SERPL-MCNC: 9.4 MG/DL — SIGNIFICANT CHANGE UP (ref 8.4–10.5)
CHLORIDE SERPL-SCNC: 101 MMOL/L — SIGNIFICANT CHANGE UP (ref 96–108)
CK SERPL-CCNC: 5818 U/L — HIGH (ref 25–170)
CO2 SERPL-SCNC: 25 MMOL/L — SIGNIFICANT CHANGE UP (ref 22–31)
CREAT SERPL-MCNC: 0.32 MG/DL — LOW (ref 0.5–1.3)
EOSINOPHIL # BLD AUTO: 0 K/UL — SIGNIFICANT CHANGE UP (ref 0–0.5)
EOSINOPHIL NFR BLD AUTO: 0 % — SIGNIFICANT CHANGE UP (ref 0–6)
GLUCOSE BLDC GLUCOMTR-MCNC: 168 MG/DL — HIGH (ref 70–99)
GLUCOSE BLDC GLUCOMTR-MCNC: 186 MG/DL — HIGH (ref 70–99)
GLUCOSE SERPL-MCNC: 158 MG/DL — HIGH (ref 70–99)
HCT VFR BLD CALC: 30.3 % — LOW (ref 34.5–45)
HGB BLD-MCNC: 10.1 G/DL — LOW (ref 11.5–15.5)
IMM GRANULOCYTES NFR BLD AUTO: 0.6 % — SIGNIFICANT CHANGE UP (ref 0–1.5)
LYMPHOCYTES # BLD AUTO: 1.56 K/UL — SIGNIFICANT CHANGE UP (ref 1–3.3)
LYMPHOCYTES # BLD AUTO: 12.1 % — LOW (ref 13–44)
MAGNESIUM SERPL-MCNC: 2.4 MG/DL — SIGNIFICANT CHANGE UP (ref 1.6–2.6)
MCHC RBC-ENTMCNC: 30.1 PG — SIGNIFICANT CHANGE UP (ref 27–34)
MCHC RBC-ENTMCNC: 33.3 GM/DL — SIGNIFICANT CHANGE UP (ref 32–36)
MCV RBC AUTO: 90.4 FL — SIGNIFICANT CHANGE UP (ref 80–100)
MONOCYTES # BLD AUTO: 0.42 K/UL — SIGNIFICANT CHANGE UP (ref 0–0.9)
MONOCYTES NFR BLD AUTO: 3.3 % — SIGNIFICANT CHANGE UP (ref 2–14)
NEUTROPHILS # BLD AUTO: 10.85 K/UL — HIGH (ref 1.8–7.4)
NEUTROPHILS NFR BLD AUTO: 83.9 % — HIGH (ref 43–77)
NRBC # BLD: 0 /100 WBCS — SIGNIFICANT CHANGE UP (ref 0–0)
PLATELET # BLD AUTO: 376 K/UL — SIGNIFICANT CHANGE UP (ref 150–400)
POTASSIUM SERPL-MCNC: 4.2 MMOL/L — SIGNIFICANT CHANGE UP (ref 3.5–5.3)
POTASSIUM SERPL-SCNC: 4.2 MMOL/L — SIGNIFICANT CHANGE UP (ref 3.5–5.3)
RBC # BLD: 3.35 M/UL — LOW (ref 3.8–5.2)
RBC # FLD: 12.9 % — SIGNIFICANT CHANGE UP (ref 10.3–14.5)
SODIUM SERPL-SCNC: 139 MMOL/L — SIGNIFICANT CHANGE UP (ref 135–145)
WBC # BLD: 12.92 K/UL — HIGH (ref 3.8–10.5)
WBC # FLD AUTO: 12.92 K/UL — HIGH (ref 3.8–10.5)

## 2020-08-19 PROCEDURE — 86803 HEPATITIS C AB TEST: CPT

## 2020-08-19 PROCEDURE — 85027 COMPLETE CBC AUTOMATED: CPT

## 2020-08-19 PROCEDURE — 99231 SBSQ HOSP IP/OBS SF/LOW 25: CPT | Mod: GC

## 2020-08-19 PROCEDURE — 84295 ASSAY OF SERUM SODIUM: CPT

## 2020-08-19 PROCEDURE — 85014 HEMATOCRIT: CPT

## 2020-08-19 PROCEDURE — 80053 COMPREHEN METABOLIC PANEL: CPT

## 2020-08-19 PROCEDURE — 87633 RESP VIRUS 12-25 TARGETS: CPT

## 2020-08-19 PROCEDURE — 86255 FLUORESCENT ANTIBODY SCREEN: CPT

## 2020-08-19 PROCEDURE — 88305 TISSUE EXAM BY PATHOLOGIST: CPT

## 2020-08-19 PROCEDURE — 81001 URINALYSIS AUTO W/SCOPE: CPT

## 2020-08-19 PROCEDURE — 82435 ASSAY OF BLOOD CHLORIDE: CPT

## 2020-08-19 PROCEDURE — 83605 ASSAY OF LACTIC ACID: CPT

## 2020-08-19 PROCEDURE — 86900 BLOOD TYPING SEROLOGIC ABO: CPT

## 2020-08-19 PROCEDURE — 82565 ASSAY OF CREATININE: CPT

## 2020-08-19 PROCEDURE — 80061 LIPID PANEL: CPT

## 2020-08-19 PROCEDURE — 86901 BLOOD TYPING SEROLOGIC RH(D): CPT

## 2020-08-19 PROCEDURE — U0003: CPT

## 2020-08-19 PROCEDURE — 85730 THROMBOPLASTIN TIME PARTIAL: CPT

## 2020-08-19 PROCEDURE — 83735 ASSAY OF MAGNESIUM: CPT

## 2020-08-19 PROCEDURE — 93306 TTE W/DOPPLER COMPLETE: CPT

## 2020-08-19 PROCEDURE — 73719 MRI LOWER EXTREMITY W/DYE: CPT

## 2020-08-19 PROCEDURE — A9585: CPT

## 2020-08-19 PROCEDURE — 80048 BASIC METABOLIC PNL TOTAL CA: CPT

## 2020-08-19 PROCEDURE — 85652 RBC SED RATE AUTOMATED: CPT

## 2020-08-19 PROCEDURE — 85610 PROTHROMBIN TIME: CPT

## 2020-08-19 PROCEDURE — 86850 RBC ANTIBODY SCREEN: CPT

## 2020-08-19 PROCEDURE — 86160 COMPLEMENT ANTIGEN: CPT

## 2020-08-19 PROCEDURE — 84132 ASSAY OF SERUM POTASSIUM: CPT

## 2020-08-19 PROCEDURE — 82803 BLOOD GASES ANY COMBINATION: CPT

## 2020-08-19 PROCEDURE — 83520 IMMUNOASSAY QUANT NOS NONAB: CPT

## 2020-08-19 PROCEDURE — 82962 GLUCOSE BLOOD TEST: CPT

## 2020-08-19 PROCEDURE — 71045 X-RAY EXAM CHEST 1 VIEW: CPT

## 2020-08-19 PROCEDURE — 83036 HEMOGLOBIN GLYCOSYLATED A1C: CPT

## 2020-08-19 PROCEDURE — 84100 ASSAY OF PHOSPHORUS: CPT

## 2020-08-19 PROCEDURE — 86038 ANTINUCLEAR ANTIBODIES: CPT

## 2020-08-19 PROCEDURE — 74177 CT ABD & PELVIS W/CONTRAST: CPT

## 2020-08-19 PROCEDURE — 76536 US EXAM OF HEAD AND NECK: CPT

## 2020-08-19 PROCEDURE — 71260 CT THORAX DX C+: CPT

## 2020-08-19 PROCEDURE — 83516 IMMUNOASSAY NONANTIBODY: CPT

## 2020-08-19 PROCEDURE — 86235 NUCLEAR ANTIGEN ANTIBODY: CPT

## 2020-08-19 PROCEDURE — 82947 ASSAY GLUCOSE BLOOD QUANT: CPT

## 2020-08-19 PROCEDURE — 82330 ASSAY OF CALCIUM: CPT

## 2020-08-19 PROCEDURE — 82553 CREATINE MB FRACTION: CPT

## 2020-08-19 PROCEDURE — 99285 EMERGENCY DEPT VISIT HI MDM: CPT

## 2020-08-19 PROCEDURE — 82550 ASSAY OF CK (CPK): CPT

## 2020-08-19 PROCEDURE — 86140 C-REACTIVE PROTEIN: CPT

## 2020-08-19 RX ORDER — METFORMIN HYDROCHLORIDE 850 MG/1
1 TABLET ORAL
Qty: 0 | Refills: 0 | DISCHARGE

## 2020-08-19 RX ORDER — INSULIN LISPRO 100/ML
VIAL (ML) SUBCUTANEOUS AT BEDTIME
Refills: 0 | Status: DISCONTINUED | OUTPATIENT
Start: 2020-08-19 | End: 2020-08-19

## 2020-08-19 RX ORDER — AZTREONAM 2 G
1 VIAL (EA) INJECTION
Qty: 13 | Refills: 0
Start: 2020-08-19 | End: 2020-09-17

## 2020-08-19 RX ORDER — AZTREONAM 2 G
1 VIAL (EA) INJECTION
Qty: 0 | Refills: 0 | DISCHARGE

## 2020-08-19 RX ORDER — INSULIN LISPRO 100/ML
VIAL (ML) SUBCUTANEOUS
Refills: 0 | Status: DISCONTINUED | OUTPATIENT
Start: 2020-08-19 | End: 2020-08-19

## 2020-08-19 RX ORDER — LOSARTAN POTASSIUM 100 MG/1
1 TABLET, FILM COATED ORAL
Qty: 0 | Refills: 0 | DISCHARGE

## 2020-08-19 RX ORDER — ATORVASTATIN CALCIUM 80 MG/1
1 TABLET, FILM COATED ORAL
Qty: 0 | Refills: 0 | DISCHARGE

## 2020-08-19 RX ORDER — PANTOPRAZOLE SODIUM 20 MG/1
1 TABLET, DELAYED RELEASE ORAL
Qty: 30 | Refills: 0
Start: 2020-08-19 | End: 2020-09-17

## 2020-08-19 RX ADMIN — Medication 1: at 09:47

## 2020-08-19 RX ADMIN — SODIUM CHLORIDE 75 MILLILITER(S): 9 INJECTION, SOLUTION INTRAVENOUS at 02:52

## 2020-08-19 RX ADMIN — Medication 1 DROP(S): at 12:52

## 2020-08-19 RX ADMIN — Medication 50 MILLIGRAM(S): at 05:10

## 2020-08-19 RX ADMIN — PANTOPRAZOLE SODIUM 40 MILLIGRAM(S): 20 TABLET, DELAYED RELEASE ORAL at 05:11

## 2020-08-19 RX ADMIN — Medication 1: at 13:04

## 2020-08-19 NOTE — CHART NOTE - NSCHARTNOTEFT_GEN_A_CORE
follow up- pt is cleared by attending md for discharge home; discussed discharge medication/follow up.

## 2020-08-19 NOTE — PROGRESS NOTE ADULT - SUBJECTIVE AND OBJECTIVE BOX
CARDIOLOGY FOLLOW UP NOTE - DR. WOOD    Subjective:    denies chest pain, dyspnea, palpitations, dizziness  ROS: otherwise negative   overnight events:      PHYSICAL EXAM:  T(C): 36.8 (08-16-20 @ 04:18), Max: 36.8 (08-16-20 @ 04:18)  HR: 61 (08-16-20 @ 04:18) (61 - 77)  BP: 103/66 (08-16-20 @ 04:18) (103/66 - 138/85)  RR: 16 (08-16-20 @ 04:18) (16 - 18)  SpO2: 97% (08-16-20 @ 04:18) (97% - 98%)  Wt(kg): --  I&O's Summary    15 Aug 2020 07:01  -  16 Aug 2020 07:00  --------------------------------------------------------  IN: 4330 mL / OUT: 0 mL / NET: 4330 mL    16 Aug 2020 07:01  -  16 Aug 2020 14:18  --------------------------------------------------------  IN: 480 mL / OUT: 0 mL / NET: 480 mL      Daily     Daily     Appearance: Normal	  Cardiovascular: Normal S1 S2,RRR, No JVD, No murmurs  Respiratory: Lungs clear to auscultation	  Gastrointestinal:  Soft, Non-tender, + BS	  Extremities: Normal range of motion, No clubbing, cyanosis or edema      Home Medications:  atorvastatin 10 mg oral tablet: 1 tab(s) orally once a day    NOTE: Patient havent taken for 1 week due to medical condition (11 Aug 2020 16:36)  losartan 25 mg oral tablet: 1 tab(s) orally once a day    NOTE: Patient havent taken for 1 week due to medical condition (11 Aug 2020 16:36)  metFORMIN 500 mg oral tablet, extended release: 1 tab(s) orally once a day    NOTE: RX SIG- 1 tab every 12 hours- patient havent taken for 1 week due to medical condition (11 Aug 2020 16:36)  timolol maleate 0.5% ophthalmic solution: 1 drop(s) to each affected eye once a day (11 Aug 2020 16:36)  travoprost 0.004% ophthalmic solution: 1 drop(s) to each affected eye once a day (in the evening) (11 Aug 2020 16:36)      MEDICATIONS  (STANDING):  dextrose 5%. 1000 milliLiter(s) (50 mL/Hr) IV Continuous <Continuous>  dextrose 50% Injectable 12.5 Gram(s) IV Push once  dextrose 50% Injectable 25 Gram(s) IV Push once  dextrose 50% Injectable 25 Gram(s) IV Push once  insulin lispro (HumaLOG) corrective regimen sliding scale   SubCutaneous three times a day before meals  insulin lispro (HumaLOG) corrective regimen sliding scale   SubCutaneous at bedtime  latanoprost 0.005% Ophthalmic Solution 1 Drop(s) Both EYES at bedtime  pantoprazole    Tablet 40 milliGRAM(s) Oral before breakfast  predniSONE   Tablet 50 milliGRAM(s) Oral daily  sodium chloride 0.9%. 1000 milliLiter(s) (125 mL/Hr) IV Continuous <Continuous>  timolol 0.5% Solution 1 Drop(s) Both EYES daily      TELEMETRY: 	    ECG:  	  RADIOLOGY:   DIAGNOSTIC TESTING:  [ ] Echocardiogram:  [ ] Catheterization:  [ ] Stress Test:    OTHER: 	    LABS:	 	    CARDIAC MARKERS:                                11.8   8.40  )-----------( 343      ( 16 Aug 2020 09:37 )             35.5     08-16    141  |  104  |  9   ----------------------------<  214<H>  3.5   |  27  |  0.30<L>    Ca    8.9      16 Aug 2020 09:37    TPro  8.2  /  Alb  3.7  /  TBili  1.0  /  DBili  x   /  AST  179<H>  /  ALT  305<H>  /  AlkPhos  66  08-16    proBNP:     Lipid Profile:   HgA1c:     Creatinine, Serum: 0.30 mg/dL (08-16-20 @ 09:37)  Creatinine, Serum: 0.35 mg/dL (08-15-20 @ 07:20)  Creatinine, Serum: 0.37 mg/dL (08-14-20 @ 06:10)
CARDIOLOGY FOLLOW UP - Dr. Cosme    CC no cp or sob        PHYSICAL EXAM:  T(C): 36.6 (08-19-20 @ 05:13), Max: 37.3 (08-19-20 @ 00:34)  HR: 79 (08-19-20 @ 05:13) (66 - 82)  BP: 103/65 (08-19-20 @ 05:13) (102/65 - 143/83)  RR: 18 (08-19-20 @ 05:13) (16 - 18)  SpO2: 98% (08-19-20 @ 05:13) (96% - 100%)  Wt(kg): --  I&O's Summary    18 Aug 2020 07:01  -  19 Aug 2020 07:00  --------------------------------------------------------  IN: 1510 mL / OUT: 500 mL / NET: 1010 mL        Appearance: Normal	  Cardiovascular: Normal S1 S2,RRR, No JVD, No murmurs  Respiratory: Lungs clear to auscultation	  Gastrointestinal:  Soft, Non-tender, + BS	  Extremities: le edema +        MEDICATIONS  (STANDING):  dextrose 5%. 1000 milliLiter(s) (50 mL/Hr) IV Continuous <Continuous>  dextrose 50% Injectable 12.5 Gram(s) IV Push once  dextrose 50% Injectable 25 Gram(s) IV Push once  dextrose 50% Injectable 25 Gram(s) IV Push once  insulin lispro (HumaLOG) corrective regimen sliding scale   SubCutaneous three times a day before meals  insulin lispro (HumaLOG) corrective regimen sliding scale   SubCutaneous at bedtime  latanoprost 0.005% Ophthalmic Solution 1 Drop(s) Both EYES at bedtime  pantoprazole    Tablet 40 milliGRAM(s) Oral before breakfast  predniSONE   Tablet 50 milliGRAM(s) Oral daily  timolol 0.5% Solution 1 Drop(s) Both EYES daily      TELEMETRY: 	    ECG:  	  RADIOLOGY:   DIAGNOSTIC TESTING:  [ ] Echocardiogram:  [ ]  Catheterization:  [ ] Stress Test:    OTHER: 	    LABS:	 	    Creatine Kinase, Serum: 7310 U/L [25 - 170] (08-18 @ 04:57)  Creatine Kinase, Serum: 7806 U/L [25 - 170] (08-17 @ 06:35)  Creatine Kinase, Serum: 9138 U/L [25 - 170] (08-16 @ 09:37)  Creatine Kinase, Serum: 96784 U/L [25 - 170] (08-15 @ 07:20)  Creatine Kinase, Serum: 13796 U/L [25 - 170] (08-14 @ 06:10)  Creatine Kinase, Serum: 16908 U/L [25 - 170] (08-13 @ 06:45)                          10.1   12.92 )-----------( 376      ( 19 Aug 2020 07:01 )             30.3     08-19    139  |  101  |  16  ----------------------------<  158<H>  4.2   |  25  |  0.32<L>    Ca    9.4      19 Aug 2020 07:01  Mg     2.4     08-19    TPro  6.9  /  Alb  3.3  /  TBili  0.8  /  DBili  x   /  AST  196<H>  /  ALT  289<H>  /  AlkPhos  60  08-18    PT/INR - ( 18 Aug 2020 04:57 )   PT: 11.1 sec;   INR: 0.93 ratio         PTT - ( 18 Aug 2020 04:57 )  PTT:26.9 sec
CARDIOLOGY FOLLOW UP - Dr. Cosme    CC no cp or sob        PHYSICAL EXAM:  T(C): 36.8 (08-13-20 @ 12:15), Max: 36.9 (08-13-20 @ 04:53)  HR: 81 (08-13-20 @ 12:15) (66 - 81)  BP: 117/75 (08-13-20 @ 12:15) (110/74 - 125/74)  RR: 17 (08-13-20 @ 12:15) (16 - 18)  SpO2: 98% (08-13-20 @ 12:15) (97% - 98%)  Wt(kg): --  I&O's Summary    12 Aug 2020 07:01  -  13 Aug 2020 07:00  --------------------------------------------------------  IN: 2760 mL / OUT: 0 mL / NET: 2760 mL    13 Aug 2020 07:01  -  13 Aug 2020 15:30  --------------------------------------------------------  IN: 240 mL / OUT: 0 mL / NET: 240 mL        Appearance: Normal	  Cardiovascular: Normal S1 S2,RRR, No JVD, No murmurs  Respiratory: Lungs clear to auscultation	  Gastrointestinal:  Soft, Non-tender, + BS	  Extremities: Normal range of motion, No clubbing, cyanosis or edema        MEDICATIONS  (STANDING):  dextrose 5%. 1000 milliLiter(s) (50 mL/Hr) IV Continuous <Continuous>  dextrose 50% Injectable 12.5 Gram(s) IV Push once  dextrose 50% Injectable 25 Gram(s) IV Push once  dextrose 50% Injectable 25 Gram(s) IV Push once  insulin lispro (HumaLOG) corrective regimen sliding scale   SubCutaneous three times a day before meals  insulin lispro (HumaLOG) corrective regimen sliding scale   SubCutaneous at bedtime  pantoprazole    Tablet 40 milliGRAM(s) Oral before breakfast  sodium bicarbonate 650 milliGRAM(s) Oral two times a day  sodium chloride 0.9%. 1000 milliLiter(s) (125 mL/Hr) IV Continuous <Continuous>      TELEMETRY: 	    ECG:  	  RADIOLOGY:   DIAGNOSTIC TESTING:  [ ] Echocardiogram:  [ ]  Catheterization:  [ ] Stress Test:    OTHER: 	    LABS:	 	    Creatine Kinase, Serum: 92990 U/L [25 - 170] (08-13 @ 06:45)  Creatine Kinase, Serum: 81463 U/L [25 - 170] (08-12 @ 06:39)  CKMB Units: 265.6 ng/mL [0.0 - 3.8] (08-11 @ 13:59)  Creatine Kinase, Serum: 68229 U/L [25 - 170] (08-11 @ 13:59)                          12.7   7.70  )-----------( 371      ( 12 Aug 2020 06:39 )             40.5     08-13    139  |  99  |  6<L>  ----------------------------<  157<H>  3.9   |  29  |  0.37<L>    Ca    9.6      13 Aug 2020 06:45    TPro  7.4  /  Alb  4.1  /  TBili  0.4  /  DBili  x   /  AST  197<H>  /  ALT  342<H>  /  AlkPhos  81  08-13
CARDIOLOGY FOLLOW UP - Dr. Cosme    CC no cp or sob/ c/o bl le edema       PHYSICAL EXAM:  T(C): 36.7 (08-17-20 @ 05:13), Max: 36.8 (08-16-20 @ 19:30)  HR: 69 (08-17-20 @ 05:13) (66 - 70)  BP: 117/64 (08-17-20 @ 05:13) (117/64 - 132/76)  RR: 20 (08-17-20 @ 05:13) (17 - 20)  SpO2: 97% (08-17-20 @ 05:13) (96% - 97%)  Wt(kg): --  I&O's Summary    16 Aug 2020 07:01  -  17 Aug 2020 07:00  --------------------------------------------------------  IN: 3333 mL / OUT: 0 mL / NET: 3333 mL        Appearance: Normal	  Cardiovascular: Normal S1 S2,RRR, No JVD, No murmurs  Respiratory: Lungs clear to auscultation	  Gastrointestinal:  Soft, Non-tender, + BS	  Extremities:  bl le  edema +         MEDICATIONS  (STANDING):  dextrose 5%. 1000 milliLiter(s) (50 mL/Hr) IV Continuous <Continuous>  dextrose 50% Injectable 12.5 Gram(s) IV Push once  dextrose 50% Injectable 25 Gram(s) IV Push once  dextrose 50% Injectable 25 Gram(s) IV Push once  insulin lispro (HumaLOG) corrective regimen sliding scale   SubCutaneous three times a day before meals  insulin lispro (HumaLOG) corrective regimen sliding scale   SubCutaneous at bedtime  latanoprost 0.005% Ophthalmic Solution 1 Drop(s) Both EYES at bedtime  pantoprazole    Tablet 40 milliGRAM(s) Oral before breakfast  potassium chloride    Tablet ER 20 milliEquivalent(s) Oral every 2 hours  predniSONE   Tablet 50 milliGRAM(s) Oral daily  sodium chloride 0.9%. 1000 milliLiter(s) (125 mL/Hr) IV Continuous <Continuous>  timolol 0.5% Solution 1 Drop(s) Both EYES daily      TELEMETRY: 	    ECG:  	  RADIOLOGY:   DIAGNOSTIC TESTING:  [ ] Echocardiogram:  < from: Transthoracic Echocardiogram (08.14.20 @ 16:26) >  EF (Visual Estimate): 75-80 %  Doppler Peak Velocity (m/sec): AoV=1.1  ------------------------------------------------------------------------  Observations:  Mitral Valve: Normal mitral valve. Mild mitral  regurgitation.  Aortic Valve/Aorta: Normal trileaflet aortic valve. Peak  transaortic valve gradient equals 6 mm Hg. Minimal aortic  regurgitation.  Peak left ventricular outflow tract  gradient equals 4 mm Hg.  Aortic Root: 3.3 cm.  Left Atrium: Normal left atrium.  LA volume index = 23  cc/m2.  Left Ventricle: Hyperdynamic left ventricular systolic  function.Estimated EF of 75-80%. Normal left ventricular  internaldimensions and wall thicknesses. Normal diastolic  function  Right Heart: Normal right atrium. Normal right ventricular  size and function.TAPSE 2.11 cm. Normal tricuspid valve.  Mild tricuspid regurgitation. Pulmonic valve not well  visualized, probably normal. Minimal pulmonic  regurgitation.  Pericardium/Pleura: Normal pericardium with no pericardial  effusion.  Hemodynamic: Estimated right atrial pressure is 8 mm Hg.  Estimated right ventricular systolic pressure equals 24 mm  Hg, assuming right atrial pressure equals 8 mm Hg,  consistent with normal pulmonary pressures.  ------------------------------------------------------------------------  Conclusions:  1. Normal mitral valve. Mild mitral regurgitation.  2. Normal trileaflet aortic valve. Minimal aortic  regurgitation.  3. Hyperdynamic left ventricular systolic  function.Estimated EF of 75-80%.  4. Normal diastolic function  5. Normal right ventricular size and function.TAPSE 2.11  cm.  6. Normal pericardium with no pericardial effusion.  *** No previous Echo exam.  ------------------------------------------------------------------------  Confirmed on  8/14/2020 - 22:44:26 by Valentin Vaughn M.D.  ------------------------------------------------------------------------    < end of copied text >    [ ]  Catheterization:  [ ] Stress Test:    OTHER: 	    LABS:	 	    Creatine Kinase, Serum: 7806 U/L [25 - 170] (08-17 @ 06:35)  Creatine Kinase, Serum: 9138 U/L [25 - 170] (08-16 @ 09:37)  Creatine Kinase, Serum: 18022 U/L [25 - 170] (08-15 @ 07:20)  Creatine Kinase, Serum: 64011 U/L [25 - 170] (08-14 @ 06:10)  Creatine Kinase, Serum: 89208 U/L [25 - 170] (08-13 @ 06:45)  Creatine Kinase, Serum: 53901 U/L [25 - 170] (08-12 @ 06:39)  CKMB Units: 265.6 ng/mL [0.0 - 3.8] (08-11 @ 13:59)  Creatine Kinase, Serum: 08187 U/L [25 - 170] (08-11 @ 13:59)                          10.6   5.60  )-----------( 347      ( 17 Aug 2020 06:36 )             31.8     08-17    139  |  102  |  10  ----------------------------<  118<H>  3.3<L>   |  26  |  0.33<L>    Ca    9.0      17 Aug 2020 06:35    TPro  7.4  /  Alb  3.5  /  TBili  1.0  /  DBili  x   /  AST  182<H>  /  ALT  280<H>  /  AlkPhos  58  08-17
CARDIOLOGY FOLLOW UP - Dr. Cosme    CC no cp/sob   s/p muscle biopsy     PHYSICAL EXAM:  T(C): 36.5 (08-18-20 @ 15:29), Max: 36.9 (08-18-20 @ 10:25)  HR: 82 (08-18-20 @ 15:29) (66 - 82)  BP: 143/83 (08-18-20 @ 15:29) (113/73 - 152/89)  RR: 18 (08-18-20 @ 15:29) (16 - 19)  SpO2: 99% (08-18-20 @ 15:29) (96% - 100%)  Wt(kg): --  I&O's Summary    17 Aug 2020 07:01  -  18 Aug 2020 07:00  --------------------------------------------------------  IN: 3330 mL / OUT: 0 mL / NET: 3330 mL    18 Aug 2020 07:01  -  18 Aug 2020 16:02  --------------------------------------------------------  IN: 270 mL / OUT: 500 mL / NET: -230 mL        Appearance: Normal	  Cardiovascular: Normal S1 S2,RRR, No JVD, No murmurs  Respiratory: Lungs clear to auscultation	  Gastrointestinal:  Soft, Non-tender, + BS	  Extremities: Normal range of motion, No clubbing, cyanosis or edema        MEDICATIONS  (STANDING):  dextrose 5%. 1000 milliLiter(s) (50 mL/Hr) IV Continuous <Continuous>  dextrose 50% Injectable 12.5 Gram(s) IV Push once  dextrose 50% Injectable 25 Gram(s) IV Push once  dextrose 50% Injectable 25 Gram(s) IV Push once  insulin lispro (HumaLOG) corrective regimen sliding scale   SubCutaneous every 6 hours  lactated ringers. 1000 milliLiter(s) (75 mL/Hr) IV Continuous <Continuous>  latanoprost 0.005% Ophthalmic Solution 1 Drop(s) Both EYES at bedtime  pantoprazole    Tablet 40 milliGRAM(s) Oral before breakfast  predniSONE   Tablet 50 milliGRAM(s) Oral daily  timolol 0.5% Solution 1 Drop(s) Both EYES daily      TELEMETRY: 	    ECG:  	  RADIOLOGY:   DIAGNOSTIC TESTING:  [ ] Echocardiogram:  [ ]  Catheterization:  [ ] Stress Test:    OTHER: 	    LABS:	 	                                10.2   6.43  )-----------( 362      ( 18 Aug 2020 04:57 )             30.4     08-18    140  |  104  |  12  ----------------------------<  128<H>  3.5   |  25  |  0.33<L>    Ca    9.0      18 Aug 2020 04:57  Mg     2.1     08-18    TPro  6.9  /  Alb  3.3  /  TBili  0.8  /  DBili  x   /  AST  196<H>  /  ALT  289<H>  /  AlkPhos  60  08-18    PT/INR - ( 18 Aug 2020 04:57 )   PT: 11.1 sec;   INR: 0.93 ratio         PTT - ( 18 Aug 2020 04:57 )  PTT:26.9 sec
CARDIOLOGY FOLLOW UP - Dr. Cosme    CC pt c/o that she is sweating and feels hot   [place] IVIG   [place] [id number] today       PHYSICAL EXAM:  T(C): 36.7 (08-14-20 @ 06:19), Max: 37.7 (08-14-20 @ 05:10)  HR: 85 (08-14-20 @ 05:10) (77 - 101)  BP: 105/72 (08-14-20 @ 05:10) (105/72 - 165/81)  RR: 18 (08-14-20 @ 05:10) (16 - 18)  SpO2: 95% (08-14-20 @ 05:10) (95% - 99%)  Wt(kg): --  I&O's Summary    13 Aug 2020 07:01  -  14 Aug 2020 07:00  --------------------------------------------------------  IN: 2270 mL / OUT: 0 mL / NET: 2270 mL        Appearance: Normal	  Cardiovascular: Normal S1 S2,RRR, No JVD, No murmurs  Respiratory: Lungs clear to auscultation	  Gastrointestinal:  Soft, Non-tender, + BS	  Extremities: Normal range of motion, No clubbing, cyanosis or edema        MEDICATIONS  (STANDING):  dextrose 5%. 1000 milliLiter(s) (50 mL/Hr) IV Continuous <Continuous>  dextrose 50% Injectable 12.5 Gram(s) IV Push once  dextrose 50% Injectable 25 Gram(s) IV Push once  dextrose 50% Injectable 25 Gram(s) IV Push once  immune   globulin 10% (GAMMAGARD) IVPB 50 Gram(s) IV Intermittent daily  insulin lispro (HumaLOG) corrective regimen sliding scale   SubCutaneous three times a day before meals  insulin lispro (HumaLOG) corrective regimen sliding scale   SubCutaneous at bedtime  pantoprazole    Tablet 40 milliGRAM(s) Oral before breakfast  predniSONE   Tablet 50 milliGRAM(s) Oral daily  sodium chloride 0.9%. 1000 milliLiter(s) (125 mL/Hr) IV Continuous <Continuous>      TELEMETRY: 	    ECG:  	  RADIOLOGY:   DIAGNOSTIC TESTING:  [ ] Echocardiogram:  [ ]  Catheterization:  [ ] Stress Test:    OTHER: 	    LABS:	 	                                10.8   5.24  )-----------( 282      ( 14 Aug 2020 06:10 )             33.3     08-14    135  |  100  |  8   ----------------------------<  138<H>  3.6   |  26  |  0.37<L>    Ca    9.0      14 Aug 2020 06:10    TPro  7.1  /  Alb  3.1<L>  /  TBili  0.5  /  DBili  x   /  AST  169<H>  /  ALT  269<H>  /  AlkPhos  63  08-14
CARDIOLOGY FOLLOW UP NOTE - DR. WOOD    Subjective:    denies chest pain, dyspnea, palpitations, dizziness  ROS: otherwise negative   overnight events:      PHYSICAL EXAM:  T(C): 36.8 (08-15-20 @ 04:48), Max: 36.9 (08-14-20 @ 15:15)  HR: 62 (08-15-20 @ 04:48) (58 - 83)  BP: 113/67 (08-15-20 @ 04:48) (101/65 - 127/79)  RR: 18 (08-15-20 @ 04:48) (18 - 18)  SpO2: 99% (08-15-20 @ 04:48) (96% - 99%)  Wt(kg): --  I&O's Summary    14 Aug 2020 07:01  -  15 Aug 2020 07:00  --------------------------------------------------------  IN: 4325 mL / OUT: 950 mL / NET: 3375 mL    15 Aug 2020 07:01  -  15 Aug 2020 12:41  --------------------------------------------------------  IN: 240 mL / OUT: 0 mL / NET: 240 mL      Daily     Daily     Appearance: Normal	  Cardiovascular: Normal S1 S2,RRR, No JVD, No murmurs  Respiratory: Lungs clear to auscultation	  Gastrointestinal:  Soft, Non-tender, + BS	  Extremities: Normal range of motion, No clubbing, cyanosis or edema      Home Medications:  atorvastatin 10 mg oral tablet: 1 tab(s) orally once a day    NOTE: Patient havent taken for 1 week due to medical condition (11 Aug 2020 16:36)  losartan 25 mg oral tablet: 1 tab(s) orally once a day    NOTE: Patient havent taken for 1 week due to medical condition (11 Aug 2020 16:36)  metFORMIN 500 mg oral tablet, extended release: 1 tab(s) orally once a day    NOTE: RX SIG- 1 tab every 12 hours- patient havent taken for 1 week due to medical condition (11 Aug 2020 16:36)  timolol maleate 0.5% ophthalmic solution: 1 drop(s) to each affected eye once a day (11 Aug 2020 16:36)  travoprost 0.004% ophthalmic solution: 1 drop(s) to each affected eye once a day (in the evening) (11 Aug 2020 16:36)      MEDICATIONS  (STANDING):  dextrose 5%. 1000 milliLiter(s) (50 mL/Hr) IV Continuous <Continuous>  dextrose 50% Injectable 12.5 Gram(s) IV Push once  dextrose 50% Injectable 25 Gram(s) IV Push once  dextrose 50% Injectable 25 Gram(s) IV Push once  insulin lispro (HumaLOG) corrective regimen sliding scale   SubCutaneous three times a day before meals  insulin lispro (HumaLOG) corrective regimen sliding scale   SubCutaneous at bedtime  latanoprost 0.005% Ophthalmic Solution 1 Drop(s) Both EYES at bedtime  pantoprazole    Tablet 40 milliGRAM(s) Oral before breakfast  predniSONE   Tablet 50 milliGRAM(s) Oral daily  sodium chloride 0.9%. 1000 milliLiter(s) (125 mL/Hr) IV Continuous <Continuous>  timolol 0.5% Solution 1 Drop(s) Both EYES daily      TELEMETRY: 	    ECG:  	  RADIOLOGY:   DIAGNOSTIC TESTING:  [ ] Echocardiogram:  [ ] Catheterization:  [ ] Stress Test:    OTHER: 	    LABS:	 	    CARDIAC MARKERS:                                11.0   3.63  )-----------( 331      ( 15 Aug 2020 07:20 )             34.6     08-15    141  |  106  |  11  ----------------------------<  127<H>  3.8   |  26  |  0.35<L>    Ca    8.8      15 Aug 2020 07:20    TPro  8.6<H>  /  Alb  3.3  /  TBili  0.6  /  DBili  x   /  AST  181<H>  /  ALT  287<H>  /  AlkPhos  63  08-15    proBNP:     Lipid Profile:   HgA1c:     Creatinine, Serum: 0.35 mg/dL (08-15-20 @ 07:20)  Creatinine, Serum: 0.37 mg/dL (08-14-20 @ 06:10)  Creatinine, Serum: 0.37 mg/dL (08-13-20 @ 06:45)
ISHAAN ARTHUR  [id number]    INTERVAL HPI/OVERNIGHT EVENTS: MRI resulted showing myositis and muscle edema. CT chest/A/P and thyroid U/S w/o signs of malignancy        MEDICATIONS  (STANDING):  dextrose 5%. 1000 milliLiter(s) (50 mL/Hr) IV Continuous <Continuous>  dextrose 50% Injectable 12.5 Gram(s) IV Push once  dextrose 50% Injectable 25 Gram(s) IV Push once  dextrose 50% Injectable 25 Gram(s) IV Push once  immune   globulin 10% (GAMMAGARD) IVPB 50 Gram(s) IV Intermittent daily  insulin lispro (HumaLOG) corrective regimen sliding scale   SubCutaneous three times a day before meals  insulin lispro (HumaLOG) corrective regimen sliding scale   SubCutaneous at bedtime  pantoprazole    Tablet 40 milliGRAM(s) Oral before breakfast  predniSONE   Tablet 50 milliGRAM(s) Oral daily  sodium chloride 0.9%. 1000 milliLiter(s) (125 mL/Hr) IV Continuous <Continuous>    MEDICATIONS  (PRN):  dextrose 40% Gel 15 Gram(s) Oral once PRN Blood Glucose LESS THAN 70 milliGRAM(s)/deciliter  glucagon  Injectable 1 milliGRAM(s) IntraMuscular once PRN Glucose LESS THAN 70 milligrams/deciliter      Allergies    No Known Allergies    Intolerances        Review of Systems:   General: No fevers/chills, no fatigue  HEENT: No blurry vision, dysphagia, or odynophagia  CVS: No CP/palpitations  Resp: No SOB/wheezing  GI: No N/V/C/D/abdominal pain  MSK: No changes in her proximal muscle weakness   Skin: No new rashes  Neuro: No headaches      Vital Signs Last 24 Hrs  T(C): 36.8 (13 Aug 2020 18:00), Max: 36.9 (13 Aug 2020 04:53)  T(F): 98.3 (13 Aug 2020 18:00), Max: 98.5 (13 Aug 2020 04:53)  HR: 89 (13 Aug 2020 18:00) (66 - 97)  BP: 122/79 (13 Aug 2020 18:00) (110/74 - 138/86)  BP(mean): --  RR: 18 (13 Aug 2020 18:00) (16 - 18)  SpO2: 98% (13 Aug 2020 18:00) (97% - 98%)    Physical Exam:  General: NAD  HEENT: EOMI, MMM  Cardio: +S1/S2, RRR  Resp: CTA b/l  GI: +BS, soft, NT/ND  MSK: 4/5 proximal muscle strength on hip and shoulder exam   Neuro: AAOx3  Psych: wnl    LABS:                        12.7   7.70  )-----------( 371      ( 12 Aug 2020 06:39 )             40.5     08-13    139  |  99  |  6<L>  ----------------------------<  157<H>  3.9   |  29  |  0.37<L>    Ca    9.6      13 Aug 2020 06:45    TPro  7.4  /  Alb  4.1  /  TBili  0.4  /  DBili  x   /  AST  197<H>  /  ALT  342<H>  /  AlkPhos  81  08-13            RADIOLOGY & ADDITIONAL TESTS:    < from: US Thyroid (08.13.20 @ 11:42) >    FINDINGS:  Right Lobe: 4.3 x 1.5 x 0.9 cm. Heterogeneous texture. Spongiform nodule interpolar region measures 1.1 x 0.8 x 0.6 m.    Left Lobe: 3.9 x 1.5 x 0.9  cm. Heterogeneous texture. Several subcentimeter colloid nodules.    Isthmus: 1 mm. Nonodule    Cervical Lymph Nodes: Lymph node inferior to left thyroid lobe measures 1.4 x 0.5 cm.    IMPRESSION:    Bilateral small thyroid nodules of low sonographic suspicion.    < end of copied text >    < from: CT Abdomen and Pelvis w/ IV Cont (08.12.20 @ 18:41) >  IMPRESSION:    No evidence of suspicious mass or lymphadenopathy in the chest, abdomen, or pelvis.    < end of copied text >      < from: MR Femur w/ IV Cont, Bilateral (08.13.20 @ 13:54) >  OSSEOUS STRUCTURES    Fractures: None.    HIP JOINTS    Articular surfaces:  Preserved given the large field of view imaging.    Effusion/Synovitis:  None.    MYOTENDINOUS STRUCTURES  Relatively symmetric muscle edema is present involving the bilateral pelvic musculature including the iliopsoas, iliacus, obturator internus, adductors, and gluteus medius. Proximal rectus femoris and faint hamstring edema is also noted bilaterally. Changes are most pronounced within the bilateral iliopsoas and proximal rectus femoris muscles. There is associated postcontrast enhancement. No associated muscle atrophy is seen.    NEUROVASCULAR STRUCTURES  Appear preserved.    OTHER SOFT TISSUES  Unremarkable.    IMPRESSION:  1. Symmetric nonspecific myositis of the bilateral pelvic and hamstring muscles with greatest involvement of the iliopsoas and proximal rectus femoris muscles.    < end of copied text >
ISHAAN ARTHUR  [id number]    INTERVAL HPI/OVERNIGHT EVENTS: Patient reports chills upon initial IVIG infusion last night, improved with slower infusion. Currently feeling ok, having lunch.     MEDICATIONS  (STANDING):  dextrose 5%. 1000 milliLiter(s) (50 mL/Hr) IV Continuous <Continuous>  dextrose 50% Injectable 12.5 Gram(s) IV Push once  dextrose 50% Injectable 25 Gram(s) IV Push once  dextrose 50% Injectable 25 Gram(s) IV Push once  insulin lispro (HumaLOG) corrective regimen sliding scale   SubCutaneous three times a day before meals  insulin lispro (HumaLOG) corrective regimen sliding scale   SubCutaneous at bedtime  pantoprazole    Tablet 40 milliGRAM(s) Oral before breakfast  predniSONE   Tablet 50 milliGRAM(s) Oral daily  sodium chloride 0.9%. 1000 milliLiter(s) (125 mL/Hr) IV Continuous <Continuous>    MEDICATIONS  (PRN):  dextrose 40% Gel 15 Gram(s) Oral once PRN Blood Glucose LESS THAN 70 milliGRAM(s)/deciliter  glucagon  Injectable 1 milliGRAM(s) IntraMuscular once PRN Glucose LESS THAN 70 milligrams/deciliter      Allergies    No Known Allergies    Intolerances          Vital Signs Last 24 Hrs  T(C): 36.8 (14 Aug 2020 14:26), Max: 37.7 (14 Aug 2020 05:10)  T(F): 98.3 (14 Aug 2020 14:26), Max: 99.9 (14 Aug 2020 05:10)  HR: 73 (14 Aug 2020 14:26) (73 - 101)  BP: 101/65 (14 Aug 2020 14:26) (101/65 - 165/81)  BP(mean): --  RR: 18 (14 Aug 2020 14:26) (17 - 18)  SpO2: 96% (14 Aug 2020 14:26) (95% - 99%)    Physical Exam:  General: NAD  HEENT: EOMI, MMM  Cardio: +S1/S2, RRR  Resp: CTA b/l  GI: +BS, soft, NT/ND  MSK: 4/5 proximal muscle strength on hip and shoulder exam   Neuro: AAOx3  Psych: wnl    LABS:                        10.8   5.24  )-----------( 282      ( 14 Aug 2020 06:10 )             33.3     08-14    135  |  100  |  8   ----------------------------<  138<H>  3.6   |  26  |  0.37<L>    Ca    9.0      14 Aug 2020 06:10    TPro  7.1  /  Alb  3.1<L>  /  TBili  0.5  /  DBili  x   /  AST  169<H>  /  ALT  269<H>  /  AlkPhos  63  08-14            RADIOLOGY & ADDITIONAL TESTS:   MR Femur w/ IV Cont, Bilateral (08.13.20 @ 13:54) >  OSSEOUS STRUCTURES    Fractures: None.    HIP JOINTS    Articular surfaces:  Preserved given the large field of view imaging.    Effusion/Synovitis:  None.    MYOTENDINOUS STRUCTURES  Relatively symmetric muscle edema is present involving the bilateral pelvic musculature including the iliopsoas, iliacus, obturator internus, adductors, and gluteus medius. Proximal rectus femoris and faint hamstring edema is also noted bilaterally. Changes are most pronounced within the bilateral iliopsoas and proximal rectus femoris muscles. There is associated postcontrast enhancement. No associated muscle atrophy is seen.    NEUROVASCULAR STRUCTURES  Appear preserved.    OTHER SOFT TISSUES  Unremarkable.    IMPRESSION:  1. Symmetric nonspecific myositis of the bilateral pelvic and hamstring muscles with greatest involvement of the iliopsoas and proximal rectus femoris muscles.
ISHAAN ARTHUR  [id number]    INTERVAL HPI/OVERNIGHT EVENTS:    The patient is lying in bed, says she feels great.    Denies CP, SOA, n/v/d, fever or chills.       Pt was seen and examined at the bedside. denies any new complaint.   MEDICATIONS  (STANDING):  dextrose 5%. 1000 milliLiter(s) (50 mL/Hr) IV Continuous <Continuous>  dextrose 50% Injectable 12.5 Gram(s) IV Push once  dextrose 50% Injectable 25 Gram(s) IV Push once  dextrose 50% Injectable 25 Gram(s) IV Push once  insulin lispro (HumaLOG) corrective regimen sliding scale   SubCutaneous three times a day before meals  insulin lispro (HumaLOG) corrective regimen sliding scale   SubCutaneous at bedtime  latanoprost 0.005% Ophthalmic Solution 1 Drop(s) Both EYES at bedtime  pantoprazole    Tablet 40 milliGRAM(s) Oral before breakfast  predniSONE   Tablet 50 milliGRAM(s) Oral daily  timolol 0.5% Solution 1 Drop(s) Both EYES daily    MEDICATIONS  (PRN):  dextrose 40% Gel 15 Gram(s) Oral once PRN Blood Glucose LESS THAN 70 milliGRAM(s)/deciliter  glucagon  Injectable 1 milliGRAM(s) IntraMuscular once PRN Glucose LESS THAN 70 milligrams/deciliter    Vital Signs Last 24 Hrs  T(C): 36.7 (19 Aug 2020 12:42), Max: 37.3 (19 Aug 2020 00:34)  T(F): 98.1 (19 Aug 2020 12:42), Max: 99.1 (19 Aug 2020 00:34)  HR: 67 (19 Aug 2020 12:42) (67 - 79)  BP: 101/68 (19 Aug 2020 12:42) (101/68 - 111/64)  BP(mean): --  RR: 16 (19 Aug 2020 12:42) (16 - 18)  SpO2: 100% (19 Aug 2020 12:42) (96% - 100%)    Physical Exam:  General: NAD  HEENT: EOMI, MMM  Cardio: +S1/S2, RRR  Resp: CTA b/l  GI: +BS, soft, NT/ND  MSK: No signs of synovitis in any joint. Power 3/5 in proximal UE muscles and 4/5 in proximal RLE, rest of the muscle groups had 5/5 power.   Neuro: AAOx3      LABS:                        10.1   12.92 )-----------( 376      ( 19 Aug 2020 07:01 )             30.3     08-19    139  |  101  |  16  ----------------------------<  158<H>  4.2   |  25  |  0.32<L>    Ca    9.4      19 Aug 2020 07:01  Mg     2.4     08-19    TPro  6.9  /  Alb  3.3  /  TBili  0.8  /  DBili  x   /  AST  196<H>  /  ALT  289<H>  /  AlkPhos  60  08-18    PT/INR - ( 18 Aug 2020 04:57 )   PT: 11.1 sec;   INR: 0.93 ratio         PTT - ( 18 Aug 2020 04:57 )  PTT:26.9 sec    Creatine Kinase, Serum: 5818 U/L (08.19.20 @ 07:01)          RADIOLOGY & ADDITIONAL TESTS:    MR Femur w/ IV Cont, Bilateral (08.13.20 @ 13:54) >  OSSEOUS STRUCTURES    Fractures: None.    HIP JOINTS    Articular surfaces:  Preserved given the large field of view imaging.    Effusion/Synovitis:  None.    MYOTENDINOUS STRUCTURES  Relatively symmetric muscle edema is present involving the bilateral pelvic musculature including the iliopsoas, iliacus, obturator internus, adductors, and gluteus medius. Proximal rectus femoris and faint hamstring edema is also noted bilaterally. Changes are most pronounced within the bilateral iliopsoas and proximal rectus femoris muscles. There is associated postcontrast enhancement. No associated muscle atrophy is seen.    NEUROVASCULAR STRUCTURES  Appear preserved.    OTHER SOFT TISSUES  Unremarkable.    IMPRESSION:  1. Symmetric nonspecific myositis of the bilateral pelvic and hamstring muscles with greatest involvement of the iliopsoas and proximal rectus femoris muscles.
Patient is a 60y old  Female who presents with a chief complaint of proximal muscle weakness and elevated CK (12 Aug 2020 17:11)    pt. seen and examined, condition same , Ck : level decreasing  INTERVAL HPI/OVERNIGHT EVENTS:  T(C): 36.7 (20 @ 20:23), Max: 36.8 (20 @ 10:01)  HR: 81 (20 @ 20:23) (75 - 85)  BP: 114/75 (20 @ 20:23) (114/68 - 126/80)  RR: 18 (20 @ 20:23) (18 - 18)  SpO2: 97% (20 @ 20:23) (96% - 98%)  Wt(kg): --  I&O's Summary    11 Aug 2020 07  -  12 Aug 2020 07:00  --------------------------------------------------------  IN: 1115 mL / OUT: 0 mL / NET: 1115 mL    12 Aug 2020 07:  -  12 Aug 2020 22:36  --------------------------------------------------------  IN: 1630 mL / OUT: 0 mL / NET: 1630 mL        PAST MEDICAL & SURGICAL HISTORY:  No pertinent past medical history  No significant past surgical history      SOCIAL HISTORY  Alcohol:  Tobacco:  Illicit substance use:    FAMILY HISTORY:    REVIEW OF SYSTEMS:  CONSTITUTIONAL: No fever, weight loss, or fatigue  EYES: No eye pain, visual disturbances, or discharge  ENMT:  No difficulty hearing, tinnitus, vertigo; No sinus or throat pain  NECK: No pain or stiffness  RESPIRATORY: No cough, wheezing, chills or hemoptysis; No shortness of breath  CARDIOVASCULAR: No chest pain, palpitations, dizziness, or leg swelling  GASTROINTESTINAL: No abdominal or epigastric pain. No nausea, vomiting, or hematemesis; No diarrhea or constipation. No melena or hematochezia.  GENITOURINARY: No dysuria, frequency, hematuria, or incontinence  NEUROLOGICAL: No headaches, memory loss, loss of strength, numbness, or tremors  SKIN: No itching, burning, rashes, or lesions   LYMPH NODES: No enlarged glands  ENDOCRINE: No heat or cold intolerance; No hair loss  MUSCULOSKELETAL: No joint pain or swelling; No muscle, back, or extremity pain  PSYCHIATRIC: No depression, anxiety, mood swings, or difficulty sleeping  HEME/LYMPH: No easy bruising, or bleeding gums  ALLERY AND IMMUNOLOGIC: No hives or eczema    RADIOLOGY & ADDITIONAL TESTS:    Imaging Personally Reviewed:  [ ] YES  [ ] NO    Consultant(s) Notes Reviewed:  [ ] YES  [ ] NO    PHYSICAL EXAM:  GENERAL: NAD, well-groomed, well-developed  HEAD:  Atraumatic, Normocephalic  EYES: EOMI, PERRLA, conjunctiva and sclera clear  ENMT: No tonsillar erythema, exudates, or enlargement; Moist mucous membranes, Good dentition, No lesions  NECK: Supple, No JVD, Normal thyroid  NERVOUS SYSTEM:  Alert & Oriented X3, Good concentration; Motor Strength 5/5 B/L upper and lower extremities; DTRs 2+ intact and symmetric  CHEST/LUNG: Clear to percussion bilaterally; No rales, rhonchi, wheezing, or rubs  HEART: Regular rate and rhythm; No murmurs, rubs, or gallops  ABDOMEN: Soft, Nontender, Nondistended; Bowel sounds present  EXTREMITIES:  2+ Peripheral Pulses, No clubbing, cyanosis, or edema  LYMPH: No lymphadenopathy noted  SKIN: No rashes or lesions    LABS:                        12.7   7.70  )-----------( 371      ( 12 Aug 2020 06:39 )             40.5     08-12    141  |  99  |  8   ----------------------------<  132<H>  4.1   |  27  |  0.37<L>    Ca    9.4      12 Aug 2020 06:39  Phos  3.2     08-  Mg     2.3     08-    TPro  6.4  /  Alb  3.7  /  TBili  0.4  /  DBili  x   /  AST  173<H>  /  ALT  305<H>  /  AlkPhos  76  08-12    PT/INR - ( 11 Aug 2020 13:59 )   PT: 11.6 sec;   INR: 0.97 ratio         PTT - ( 11 Aug 2020 13:59 )  PTT:30.8 sec  Urinalysis Basic - ( 11 Aug 2020 15:28 )    Color: Colorless / Appearance: Clear / S.006 / pH: x  Gluc: x / Ketone: Trace  / Bili: Negative / Urobili: Negative   Blood: x / Protein: Negative / Nitrite: Negative   Leuk Esterase: Negative / RBC: 0 /hpf / WBC 0 /HPF   Sq Epi: x / Non Sq Epi: 0 /hpf / Bacteria: 0.0      CAPILLARY BLOOD GLUCOSE      POCT Blood Glucose.: 137 mg/dL (12 Aug 2020 21:40)  POCT Blood Glucose.: 127 mg/dL (12 Aug 2020 17:38)  POCT Blood Glucose.: 141 mg/dL (12 Aug 2020 12:55)  POCT Blood Glucose.: 117 mg/dL (12 Aug 2020 08:55)        Urinalysis Basic - ( 11 Aug 2020 15:28 )    Color: Colorless / Appearance: Clear / S.006 / pH: x  Gluc: x / Ketone: Trace  / Bili: Negative / Urobili: Negative   Blood: x / Protein: Negative / Nitrite: Negative   Leuk Esterase: Negative / RBC: 0 /hpf / WBC 0 /HPF   Sq Epi: x / Non Sq Epi: 0 /hpf / Bacteria: 0.0        MEDICATIONS  (STANDING):  dextrose 5%. 1000 milliLiter(s) (50 mL/Hr) IV Continuous <Continuous>  dextrose 50% Injectable 12.5 Gram(s) IV Push once  dextrose 50% Injectable 25 Gram(s) IV Push once  dextrose 50% Injectable 25 Gram(s) IV Push once  insulin lispro (HumaLOG) corrective regimen sliding scale   SubCutaneous three times a day before meals  insulin lispro (HumaLOG) corrective regimen sliding scale   SubCutaneous at bedtime  pantoprazole    Tablet 40 milliGRAM(s) Oral before breakfast  sodium bicarbonate 650 milliGRAM(s) Oral two times a day  sodium chloride 0.9%. 1000 milliLiter(s) (75 mL/Hr) IV Continuous <Continuous>    MEDICATIONS  (PRN):  dextrose 40% Gel 15 Gram(s) Oral once PRN Blood Glucose LESS THAN 70 milliGRAM(s)/deciliter  glucagon  Injectable 1 milliGRAM(s) IntraMuscular once PRN Glucose LESS THAN 70 milligrams/deciliter      Care Discussed with Consultants/Other Providers [ ] YES  [ ] NO
Patient is a 60y old  Female who presents with a chief complaint of proximal muscle weakness and elevated CK (13 Aug 2020 18:22)    c/o rigors after started on IVIG     INTERVAL HPI/OVERNIGHT EVENTS:  T(C): 37.1 (08-13-20 @ 21:34), Max: 37.2 (08-13-20 @ 20:50)  HR: 86 (08-13-20 @ 21:34) (66 - 101)  BP: 126/81 (08-13-20 @ 21:34) (110/74 - 165/81)  RR: 18 (08-13-20 @ 21:34) (16 - 18)  SpO2: 96% (08-13-20 @ 21:34) (96% - 99%)  Wt(kg): --  I&O's Summary    12 Aug 2020 07:01  -  13 Aug 2020 07:00  --------------------------------------------------------  IN: 2760 mL / OUT: 0 mL / NET: 2760 mL    13 Aug 2020 07:01  -  13 Aug 2020 21:40  --------------------------------------------------------  IN: 540 mL / OUT: 0 mL / NET: 540 mL        PAST MEDICAL & SURGICAL HISTORY:  No pertinent past medical history  No significant past surgical history      SOCIAL HISTORY  Alcohol:  Tobacco:  Illicit substance use:    FAMILY HISTORY:    REVIEW OF SYSTEMS:  CONSTITUTIONAL: No fever, weight loss, or fatigue  EYES: No eye pain, visual disturbances, or discharge  ENMT:  No difficulty hearing, tinnitus, vertigo; No sinus or throat pain  NECK: No pain or stiffness  RESPIRATORY: No cough, wheezing, chills or hemoptysis; No shortness of breath  CARDIOVASCULAR: No chest pain, palpitations, dizziness, or leg swelling  GASTROINTESTINAL: No abdominal or epigastric pain. No nausea, vomiting, or hematemesis; No diarrhea or constipation. No melena or hematochezia.  GENITOURINARY: No dysuria, frequency, hematuria, or incontinence  NEUROLOGICAL: No headaches, memory loss, loss of strength, numbness, or tremors  SKIN: No itching, burning, rashes, or lesions   LYMPH NODES: No enlarged glands  ENDOCRINE: No heat or cold intolerance; No hair loss  MUSCULOSKELETAL: No joint pain or swelling; No muscle, back, or extremity pain  PSYCHIATRIC: No depression, anxiety, mood swings, or difficulty sleeping  HEME/LYMPH: No easy bruising, or bleeding gums  ALLERY AND IMMUNOLOGIC: No hives or eczema    RADIOLOGY & ADDITIONAL TESTS:    Imaging Personally Reviewed:  [ ] YES  [ ] NO    Consultant(s) Notes Reviewed:  [ ] YES  [ ] NO    PHYSICAL EXAM:  GENERAL: NAD, well-groomed, well-developed  HEAD:  Atraumatic, Normocephalic  EYES: EOMI, PERRLA, conjunctiva and sclera clear  ENMT: No tonsillar erythema, exudates, or enlargement; Moist mucous membranes, Good dentition, No lesions  NECK: Supple, No JVD, Normal thyroid  NERVOUS SYSTEM:  Alert & Oriented X3, Good concentration; Motor Strength 5/5 B/L upper and lower extremities; DTRs 2+ intact and symmetric  CHEST/LUNG: Clear to percussion bilaterally; No rales, rhonchi, wheezing, or rubs  HEART: Regular rate and rhythm; No murmurs, rubs, or gallops  ABDOMEN: Soft, Nontender, Nondistended; Bowel sounds present  EXTREMITIES:  2+ Peripheral Pulses, No clubbing, cyanosis, or edema  LYMPH: No lymphadenopathy noted  SKIN: No rashes or lesions    LABS:                        12.7   7.70  )-----------( 371      ( 12 Aug 2020 06:39 )             40.5     08-13    139  |  99  |  6<L>  ----------------------------<  157<H>  3.9   |  29  |  0.37<L>    Ca    9.6      13 Aug 2020 06:45    TPro  7.4  /  Alb  4.1  /  TBili  0.4  /  DBili  x   /  AST  197<H>  /  ALT  342<H>  /  AlkPhos  81  08-13        CAPILLARY BLOOD GLUCOSE      POCT Blood Glucose.: 165 mg/dL (13 Aug 2020 17:20)  POCT Blood Glucose.: 150 mg/dL (13 Aug 2020 12:21)  POCT Blood Glucose.: 136 mg/dL (13 Aug 2020 08:32)            MEDICATIONS  (STANDING):  dextrose 5%. 1000 milliLiter(s) (50 mL/Hr) IV Continuous <Continuous>  dextrose 50% Injectable 12.5 Gram(s) IV Push once  dextrose 50% Injectable 25 Gram(s) IV Push once  dextrose 50% Injectable 25 Gram(s) IV Push once  immune   globulin 10% (GAMMAGARD) IVPB 50 Gram(s) IV Intermittent daily  insulin lispro (HumaLOG) corrective regimen sliding scale   SubCutaneous three times a day before meals  insulin lispro (HumaLOG) corrective regimen sliding scale   SubCutaneous at bedtime  pantoprazole    Tablet 40 milliGRAM(s) Oral before breakfast  predniSONE   Tablet 50 milliGRAM(s) Oral daily  sodium chloride 0.9%. 1000 milliLiter(s) (125 mL/Hr) IV Continuous <Continuous>    MEDICATIONS  (PRN):  dextrose 40% Gel 15 Gram(s) Oral once PRN Blood Glucose LESS THAN 70 milliGRAM(s)/deciliter  diphenhydrAMINE   Injectable 50 milliGRAM(s) IV Push once PRN Rash and/or Itching  glucagon  Injectable 1 milliGRAM(s) IntraMuscular once PRN Glucose LESS THAN 70 milligrams/deciliter      Care Discussed with Consultants/Other Providers [ ] YES  [ ] NO
Patient is a 60y old  Female who presents with a chief complaint of proximal muscle weakness and elevated CK (14 Aug 2020 18:01)    pt. seen and examined, NAD    INTERVAL HPI/OVERNIGHT EVENTS:  T(C): 36.4 (08-14-20 @ 19:47), Max: 37.7 (08-14-20 @ 05:10)  HR: 68 (08-14-20 @ 19:47) (58 - 86)  BP: 113/69 (08-14-20 @ 19:47) (101/65 - 127/79)  RR: 18 (08-14-20 @ 19:47) (18 - 18)  SpO2: 97% (08-14-20 @ 19:47) (95% - 99%)  Wt(kg): --  I&O's Summary    13 Aug 2020 07:01  -  14 Aug 2020 07:00  --------------------------------------------------------  IN: 2270 mL / OUT: 0 mL / NET: 2270 mL    14 Aug 2020 07:01  -  14 Aug 2020 23:40  --------------------------------------------------------  IN: 2825 mL / OUT: 950 mL / NET: 1875 mL        PAST MEDICAL & SURGICAL HISTORY:  No pertinent past medical history  No significant past surgical history      SOCIAL HISTORY  Alcohol:  Tobacco:  Illicit substance use:    FAMILY HISTORY:    REVIEW OF SYSTEMS:  CONSTITUTIONAL: No fever, weight loss, or fatigue  EYES: No eye pain, visual disturbances, or discharge  ENMT:  No difficulty hearing, tinnitus, vertigo; No sinus or throat pain  NECK: No pain or stiffness  RESPIRATORY: No cough, wheezing, chills or hemoptysis; No shortness of breath  CARDIOVASCULAR: No chest pain, palpitations, dizziness, or leg swelling  GASTROINTESTINAL: No abdominal or epigastric pain. No nausea, vomiting, or hematemesis; No diarrhea or constipation. No melena or hematochezia.  GENITOURINARY: No dysuria, frequency, hematuria, or incontinence  NEUROLOGICAL: No headaches, memory loss, loss of strength, numbness, or tremors  SKIN: No itching, burning, rashes, or lesions   LYMPH NODES: No enlarged glands  ENDOCRINE: No heat or cold intolerance; No hair loss  MUSCULOSKELETAL: No joint pain or swelling; No muscle, back, or extremity pain  PSYCHIATRIC: No depression, anxiety, mood swings, or difficulty sleeping  HEME/LYMPH: No easy bruising, or bleeding gums  ALLERY AND IMMUNOLOGIC: No hives or eczema    RADIOLOGY & ADDITIONAL TESTS:    Imaging Personally Reviewed:  [ ] YES  [ ] NO    Consultant(s) Notes Reviewed:  [ ] YES  [ ] NO    PHYSICAL EXAM:  GENERAL: NAD, well-groomed, well-developed  HEAD:  Atraumatic, Normocephalic  EYES: EOMI, PERRLA, conjunctiva and sclera clear  ENMT: No tonsillar erythema, exudates, or enlargement; Moist mucous membranes, Good dentition, No lesions  NECK: Supple, No JVD, Normal thyroid  NERVOUS SYSTEM:  Alert & Oriented X3, Good concentration; Motor Strength 5/5 B/L upper and lower extremities; DTRs 2+ intact and symmetric  CHEST/LUNG: Clear to percussion bilaterally; No rales, rhonchi, wheezing, or rubs  HEART: Regular rate and rhythm; No murmurs, rubs, or gallops  ABDOMEN: Soft, Nontender, Nondistended; Bowel sounds present  EXTREMITIES:  2+ Peripheral Pulses, No clubbing, cyanosis, or edema  LYMPH: No lymphadenopathy noted  SKIN: No rashes or lesions    LABS:                        10.8   5.24  )-----------( 282      ( 14 Aug 2020 06:10 )             33.3     08-14    135  |  100  |  8   ----------------------------<  138<H>  3.6   |  26  |  0.37<L>    Ca    9.0      14 Aug 2020 06:10    TPro  7.1  /  Alb  3.1<L>  /  TBili  0.5  /  DBili  x   /  AST  169<H>  /  ALT  269<H>  /  AlkPhos  63  08-14        CAPILLARY BLOOD GLUCOSE      POCT Blood Glucose.: 175 mg/dL (14 Aug 2020 22:07)  POCT Blood Glucose.: 150 mg/dL (14 Aug 2020 17:18)  POCT Blood Glucose.: 206 mg/dL (14 Aug 2020 12:12)  POCT Blood Glucose.: 149 mg/dL (14 Aug 2020 08:41)            MEDICATIONS  (STANDING):  dextrose 5%. 1000 milliLiter(s) (50 mL/Hr) IV Continuous <Continuous>  dextrose 50% Injectable 12.5 Gram(s) IV Push once  dextrose 50% Injectable 25 Gram(s) IV Push once  dextrose 50% Injectable 25 Gram(s) IV Push once  insulin lispro (HumaLOG) corrective regimen sliding scale   SubCutaneous three times a day before meals  insulin lispro (HumaLOG) corrective regimen sliding scale   SubCutaneous at bedtime  latanoprost 0.005% Ophthalmic Solution 1 Drop(s) Both EYES at bedtime  pantoprazole    Tablet 40 milliGRAM(s) Oral before breakfast  predniSONE   Tablet 50 milliGRAM(s) Oral daily  sodium chloride 0.9%. 1000 milliLiter(s) (125 mL/Hr) IV Continuous <Continuous>    MEDICATIONS  (PRN):  dextrose 40% Gel 15 Gram(s) Oral once PRN Blood Glucose LESS THAN 70 milliGRAM(s)/deciliter  glucagon  Injectable 1 milliGRAM(s) IntraMuscular once PRN Glucose LESS THAN 70 milligrams/deciliter      Care Discussed with Consultants/Other Providers [ ] YES  [ ] NO
Patient is a 60y old  Female who presents with a chief complaint of proximal muscle weakness and elevated CK (15 Aug 2020 12:40)    pt. sammi nd examined, feeling better , proximal muscle weakness is little better   INTERVAL HPI/OVERNIGHT EVENTS:  T(C): 36.4 (08-15-20 @ 19:26), Max: 36.8 (08-15-20 @ 04:48)  HR: 77 (08-15-20 @ 19:26) (62 - 77)  BP: 138/85 (08-15-20 @ 19:26) (113/67 - 138/85)  RR: 18 (08-15-20 @ 19:26) (18 - 18)  SpO2: 98% (08-15-20 @ 19:26) (98% - 99%)  Wt(kg): --  I&O's Summary    14 Aug 2020 07:01  -  15 Aug 2020 07:00  --------------------------------------------------------  IN: 4325 mL / OUT: 950 mL / NET: 3375 mL    15 Aug 2020 07:01  -  15 Aug 2020 21:42  --------------------------------------------------------  IN: 2350 mL / OUT: 0 mL / NET: 2350 mL        PAST MEDICAL & SURGICAL HISTORY:  No pertinent past medical history  No significant past surgical history      SOCIAL HISTORY  Alcohol:  Tobacco:  Illicit substance use:    FAMILY HISTORY:    REVIEW OF SYSTEMS:  CONSTITUTIONAL: No fever, weight loss, or fatigue  EYES: No eye pain, visual disturbances, or discharge  ENMT:  No difficulty hearing, tinnitus, vertigo; No sinus or throat pain  NECK: No pain or stiffness  RESPIRATORY: No cough, wheezing, chills or hemoptysis; No shortness of breath  CARDIOVASCULAR: No chest pain, palpitations, dizziness, or leg swelling  GASTROINTESTINAL: No abdominal or epigastric pain. No nausea, vomiting, or hematemesis; No diarrhea or constipation. No melena or hematochezia.  GENITOURINARY: No dysuria, frequency, hematuria, or incontinence  NEUROLOGICAL: No headaches, memory loss, loss of strength, numbness, or tremors  SKIN: No itching, burning, rashes, or lesions   LYMPH NODES: No enlarged glands  ENDOCRINE: No heat or cold intolerance; No hair loss  MUSCULOSKELETAL: No joint pain or swelling; No muscle, back, or extremity pain  PSYCHIATRIC: No depression, anxiety, mood swings, or difficulty sleeping  HEME/LYMPH: No easy bruising, or bleeding gums  ALLERY AND IMMUNOLOGIC: No hives or eczema    RADIOLOGY & ADDITIONAL TESTS:    Imaging Personally Reviewed:  [ ] YES  [ ] NO    Consultant(s) Notes Reviewed:  [ ] YES  [ ] NO    PHYSICAL EXAM:  GENERAL: NAD, well-groomed, well-developed  HEAD:  Atraumatic, Normocephalic  EYES: EOMI, PERRLA, conjunctiva and sclera clear  ENMT: No tonsillar erythema, exudates, or enlargement; Moist mucous membranes, Good dentition, No lesions  NECK: Supple, No JVD, Normal thyroid  NERVOUS SYSTEM:  Alert & Oriented X3, Good concentration; Motor Strength 5/5 B/L upper and lower extremities; DTRs 2+ intact and symmetric  CHEST/LUNG: Clear to percussion bilaterally; No rales, rhonchi, wheezing, or rubs  HEART: Regular rate and rhythm; No murmurs, rubs, or gallops  ABDOMEN: Soft, Nontender, Nondistended; Bowel sounds present  EXTREMITIES:  2+ Peripheral Pulses, No clubbing, cyanosis, or edema  LYMPH: No lymphadenopathy noted  SKIN: No rashes or lesions    LABS:                        11.0   3.63  )-----------( 331      ( 15 Aug 2020 07:20 )             34.6     08-15    141  |  106  |  11  ----------------------------<  127<H>  3.8   |  26  |  0.35<L>    Ca    8.8      15 Aug 2020 07:20    TPro  8.6<H>  /  Alb  3.3  /  TBili  0.6  /  DBili  x   /  AST  181<H>  /  ALT  287<H>  /  AlkPhos  63  08-15        CAPILLARY BLOOD GLUCOSE      POCT Blood Glucose.: 178 mg/dL (15 Aug 2020 17:09)  POCT Blood Glucose.: 179 mg/dL (15 Aug 2020 12:07)  POCT Blood Glucose.: 175 mg/dL (15 Aug 2020 08:34)  POCT Blood Glucose.: 175 mg/dL (14 Aug 2020 22:07)            MEDICATIONS  (STANDING):  dextrose 5%. 1000 milliLiter(s) (50 mL/Hr) IV Continuous <Continuous>  dextrose 50% Injectable 12.5 Gram(s) IV Push once  dextrose 50% Injectable 25 Gram(s) IV Push once  dextrose 50% Injectable 25 Gram(s) IV Push once  insulin lispro (HumaLOG) corrective regimen sliding scale   SubCutaneous three times a day before meals  insulin lispro (HumaLOG) corrective regimen sliding scale   SubCutaneous at bedtime  latanoprost 0.005% Ophthalmic Solution 1 Drop(s) Both EYES at bedtime  pantoprazole    Tablet 40 milliGRAM(s) Oral before breakfast  predniSONE   Tablet 50 milliGRAM(s) Oral daily  sodium chloride 0.9%. 1000 milliLiter(s) (125 mL/Hr) IV Continuous <Continuous>  timolol 0.5% Solution 1 Drop(s) Both EYES daily    MEDICATIONS  (PRN):  dextrose 40% Gel 15 Gram(s) Oral once PRN Blood Glucose LESS THAN 70 milliGRAM(s)/deciliter  glucagon  Injectable 1 milliGRAM(s) IntraMuscular once PRN Glucose LESS THAN 70 milligrams/deciliter      Care Discussed with Consultants/Other Providers [ ] YES  [ ] NO
Patient is a 60y old  Female who presents with a chief complaint of proximal muscle weakness and elevated CK (16 Aug 2020 14:18)    denies any c/o    INTERVAL HPI/OVERNIGHT EVENTS:  T(C): 36.6 (08-16-20 @ 14:41), Max: 36.8 (08-16-20 @ 04:18)  HR: 69 (08-16-20 @ 14:41) (61 - 77)  BP: 120/72 (08-16-20 @ 14:41) (103/66 - 138/85)  RR: 17 (08-16-20 @ 14:41) (16 - 18)  SpO2: 97% (08-16-20 @ 14:41) (97% - 98%)  Wt(kg): --  I&O's Summary    15 Aug 2020 07:01  -  16 Aug 2020 07:00  --------------------------------------------------------  IN: 4330 mL / OUT: 0 mL / NET: 4330 mL    16 Aug 2020 07:01  -  16 Aug 2020 17:39  --------------------------------------------------------  IN: 480 mL / OUT: 0 mL / NET: 480 mL        PAST MEDICAL & SURGICAL HISTORY:  No pertinent past medical history  No significant past surgical history      SOCIAL HISTORY  Alcohol:  Tobacco:  Illicit substance use:    FAMILY HISTORY:    REVIEW OF SYSTEMS:  CONSTITUTIONAL: No fever, weight loss, or fatigue  EYES: No eye pain, visual disturbances, or discharge  ENMT:  No difficulty hearing, tinnitus, vertigo; No sinus or throat pain  NECK: No pain or stiffness  RESPIRATORY: No cough, wheezing, chills or hemoptysis; No shortness of breath  CARDIOVASCULAR: No chest pain, palpitations, dizziness, or leg swelling  GASTROINTESTINAL: No abdominal or epigastric pain. No nausea, vomiting, or hematemesis; No diarrhea or constipation. No melena or hematochezia.  GENITOURINARY: No dysuria, frequency, hematuria, or incontinence  NEUROLOGICAL: No headaches, memory loss, loss of strength, numbness, or tremors  SKIN: No itching, burning, rashes, or lesions   LYMPH NODES: No enlarged glands  ENDOCRINE: No heat or cold intolerance; No hair loss  MUSCULOSKELETAL: No joint pain or swelling; No muscle, back, or extremity pain  PSYCHIATRIC: No depression, anxiety, mood swings, or difficulty sleeping  HEME/LYMPH: No easy bruising, or bleeding gums  ALLERY AND IMMUNOLOGIC: No hives or eczema    RADIOLOGY & ADDITIONAL TESTS:    Imaging Personally Reviewed:  [ ] YES  [ ] NO    Consultant(s) Notes Reviewed:  [ ] YES  [ ] NO    PHYSICAL EXAM:  GENERAL: NAD, well-groomed, well-developed  HEAD:  Atraumatic, Normocephalic  EYES: EOMI, PERRLA, conjunctiva and sclera clear  ENMT: No tonsillar erythema, exudates, or enlargement; Moist mucous membranes, Good dentition, No lesions  NECK: Supple, No JVD, Normal thyroid  NERVOUS SYSTEM:  Alert & Oriented X3, Good concentration; Motor Strength 5/5 B/L upper and lower extremities; DTRs 2+ intact and symmetric  CHEST/LUNG: Clear to percussion bilaterally; No rales, rhonchi, wheezing, or rubs  HEART: Regular rate and rhythm; No murmurs, rubs, or gallops  ABDOMEN: Soft, Nontender, Nondistended; Bowel sounds present  EXTREMITIES:  2+ Peripheral Pulses, No clubbing, cyanosis, or edema  LYMPH: No lymphadenopathy noted  SKIN: No rashes or lesions    LABS:                        11.8   8.40  )-----------( 343      ( 16 Aug 2020 09:37 )             35.5     08-16    141  |  104  |  9   ----------------------------<  214<H>  3.5   |  27  |  0.30<L>    Ca    8.9      16 Aug 2020 09:37    TPro  8.2  /  Alb  3.7  /  TBili  1.0  /  DBili  x   /  AST  179<H>  /  ALT  305<H>  /  AlkPhos  66  08-16        CAPILLARY BLOOD GLUCOSE      POCT Blood Glucose.: 137 mg/dL (16 Aug 2020 17:04)  POCT Blood Glucose.: 209 mg/dL (16 Aug 2020 12:30)  POCT Blood Glucose.: 182 mg/dL (16 Aug 2020 08:39)  POCT Blood Glucose.: 116 mg/dL (15 Aug 2020 21:52)            MEDICATIONS  (STANDING):  dextrose 5%. 1000 milliLiter(s) (50 mL/Hr) IV Continuous <Continuous>  dextrose 50% Injectable 12.5 Gram(s) IV Push once  dextrose 50% Injectable 25 Gram(s) IV Push once  dextrose 50% Injectable 25 Gram(s) IV Push once  insulin lispro (HumaLOG) corrective regimen sliding scale   SubCutaneous three times a day before meals  insulin lispro (HumaLOG) corrective regimen sliding scale   SubCutaneous at bedtime  latanoprost 0.005% Ophthalmic Solution 1 Drop(s) Both EYES at bedtime  pantoprazole    Tablet 40 milliGRAM(s) Oral before breakfast  predniSONE   Tablet 50 milliGRAM(s) Oral daily  sodium chloride 0.9%. 1000 milliLiter(s) (125 mL/Hr) IV Continuous <Continuous>  timolol 0.5% Solution 1 Drop(s) Both EYES daily    MEDICATIONS  (PRN):  dextrose 40% Gel 15 Gram(s) Oral once PRN Blood Glucose LESS THAN 70 milliGRAM(s)/deciliter  glucagon  Injectable 1 milliGRAM(s) IntraMuscular once PRN Glucose LESS THAN 70 milligrams/deciliter      Care Discussed with Consultants/Other Providers [ ] YES  [ ] NO
Patient is a 60y old  Female who presents with a chief complaint of proximal muscle weakness and elevated CK (17 Aug 2020 12:27)    denies any c/o , scheduled for muscle biopsy in am     INTERVAL HPI/OVERNIGHT EVENTS:  T(C): 36.3 (08-17-20 @ 20:19), Max: 36.8 (08-17-20 @ 12:39)  HR: 75 (08-17-20 @ 20:19) (69 - 75)  BP: 152/89 (08-17-20 @ 20:19) (117/64 - 152/89)  RR: 19 (08-17-20 @ 20:19) (18 - 20)  SpO2: 100% (08-17-20 @ 20:19) (97% - 100%)  Wt(kg): --  I&O's Summary    16 Aug 2020 07:01  -  17 Aug 2020 07:00  --------------------------------------------------------  IN: 3333 mL / OUT: 0 mL / NET: 3333 mL    17 Aug 2020 07:01  -  18 Aug 2020 00:41  --------------------------------------------------------  IN: 2280 mL / OUT: 0 mL / NET: 2280 mL        PAST MEDICAL & SURGICAL HISTORY:  No pertinent past medical history  No significant past surgical history      SOCIAL HISTORY  Alcohol:  Tobacco:  Illicit substance use:    FAMILY HISTORY:    REVIEW OF SYSTEMS:  CONSTITUTIONAL: No fever, weight loss, or fatigue  EYES: No eye pain, visual disturbances, or discharge  ENMT:  No difficulty hearing, tinnitus, vertigo; No sinus or throat pain  NECK: No pain or stiffness  RESPIRATORY: No cough, wheezing, chills or hemoptysis; No shortness of breath  CARDIOVASCULAR: No chest pain, palpitations, dizziness, or leg swelling  GASTROINTESTINAL: No abdominal or epigastric pain. No nausea, vomiting, or hematemesis; No diarrhea or constipation. No melena or hematochezia.  GENITOURINARY: No dysuria, frequency, hematuria, or incontinence  NEUROLOGICAL: No headaches, memory loss, loss of strength, numbness, or tremors  SKIN: No itching, burning, rashes, or lesions   LYMPH NODES: No enlarged glands  ENDOCRINE: No heat or cold intolerance; No hair loss  MUSCULOSKELETAL: No joint pain or swelling; No muscle, back, or extremity pain  PSYCHIATRIC: No depression, anxiety, mood swings, or difficulty sleeping  HEME/LYMPH: No easy bruising, or bleeding gums  ALLERY AND IMMUNOLOGIC: No hives or eczema    RADIOLOGY & ADDITIONAL TESTS:    Imaging Personally Reviewed:  [ ] YES  [ ] NO    Consultant(s) Notes Reviewed:  [ ] YES  [ ] NO    PHYSICAL EXAM:  GENERAL: NAD, well-groomed, well-developed  HEAD:  Atraumatic, Normocephalic  EYES: EOMI, PERRLA, conjunctiva and sclera clear  ENMT: No tonsillar erythema, exudates, or enlargement; Moist mucous membranes, Good dentition, No lesions  NECK: Supple, No JVD, Normal thyroid  NERVOUS SYSTEM:  Alert & Oriented X3, Good concentration; Motor Strength 5/5 B/L upper and lower extremities; DTRs 2+ intact and symmetric  CHEST/LUNG: Clear to percussion bilaterally; No rales, rhonchi, wheezing, or rubs  HEART: Regular rate and rhythm; No murmurs, rubs, or gallops  ABDOMEN: Soft, Nontender, Nondistended; Bowel sounds present  EXTREMITIES:  2+ Peripheral Pulses, No clubbing, cyanosis, or edema  LYMPH: No lymphadenopathy noted  SKIN: No rashes or lesions    LABS:                        10.6   5.60  )-----------( 347      ( 17 Aug 2020 06:36 )             31.8     08-17    139  |  102  |  10  ----------------------------<  118<H>  3.3<L>   |  26  |  0.33<L>    Ca    9.0      17 Aug 2020 06:35    TPro  7.4  /  Alb  3.5  /  TBili  1.0  /  DBili  x   /  AST  182<H>  /  ALT  280<H>  /  AlkPhos  58  08-17        CAPILLARY BLOOD GLUCOSE      POCT Blood Glucose.: 160 mg/dL (18 Aug 2020 00:21)  POCT Blood Glucose.: 139 mg/dL (17 Aug 2020 22:04)  POCT Blood Glucose.: 117 mg/dL (17 Aug 2020 17:12)  POCT Blood Glucose.: 163 mg/dL (17 Aug 2020 12:41)  POCT Blood Glucose.: 188 mg/dL (17 Aug 2020 08:23)            MEDICATIONS  (STANDING):  dextrose 5%. 1000 milliLiter(s) (50 mL/Hr) IV Continuous <Continuous>  dextrose 50% Injectable 12.5 Gram(s) IV Push once  dextrose 50% Injectable 25 Gram(s) IV Push once  dextrose 50% Injectable 25 Gram(s) IV Push once  insulin lispro (HumaLOG) corrective regimen sliding scale   SubCutaneous every 6 hours  latanoprost 0.005% Ophthalmic Solution 1 Drop(s) Both EYES at bedtime  pantoprazole    Tablet 40 milliGRAM(s) Oral before breakfast  predniSONE   Tablet 50 milliGRAM(s) Oral daily  sodium chloride 0.9%. 1000 milliLiter(s) (50 mL/Hr) IV Continuous <Continuous>  timolol 0.5% Solution 1 Drop(s) Both EYES daily    MEDICATIONS  (PRN):  dextrose 40% Gel 15 Gram(s) Oral once PRN Blood Glucose LESS THAN 70 milliGRAM(s)/deciliter  glucagon  Injectable 1 milliGRAM(s) IntraMuscular once PRN Glucose LESS THAN 70 milligrams/deciliter      Care Discussed with Consultants/Other Providers [ ] YES  [ ] NO
Patient is a 60y old  Female who presents with a chief complaint of proximal muscle weakness and elevated CK (18 Aug 2020 16:01)    pt. seen and examined, s/p muscle biopsy   INTERVAL HPI/OVERNIGHT EVENTS:  T(C): 36.5 (08-18-20 @ 15:29), Max: 36.9 (08-18-20 @ 10:25)  HR: 82 (08-18-20 @ 15:29) (66 - 82)  BP: 143/83 (08-18-20 @ 15:29) (113/73 - 152/89)  RR: 18 (08-18-20 @ 15:29) (16 - 19)  SpO2: 99% (08-18-20 @ 15:29) (96% - 100%)  Wt(kg): --  I&O's Summary    17 Aug 2020 07:01  -  18 Aug 2020 07:00  --------------------------------------------------------  IN: 3330 mL / OUT: 0 mL / NET: 3330 mL    18 Aug 2020 07:01  -  18 Aug 2020 19:17  --------------------------------------------------------  IN: 510 mL / OUT: 500 mL / NET: 10 mL        PAST MEDICAL & SURGICAL HISTORY:  No pertinent past medical history  No significant past surgical history      SOCIAL HISTORY  Alcohol:  Tobacco:  Illicit substance use:    FAMILY HISTORY:    REVIEW OF SYSTEMS:  CONSTITUTIONAL: No fever, weight loss, or fatigue  EYES: No eye pain, visual disturbances, or discharge  ENMT:  No difficulty hearing, tinnitus, vertigo; No sinus or throat pain  NECK: No pain or stiffness  RESPIRATORY: No cough, wheezing, chills or hemoptysis; No shortness of breath  CARDIOVASCULAR: No chest pain, palpitations, dizziness, or leg swelling  GASTROINTESTINAL: No abdominal or epigastric pain. No nausea, vomiting, or hematemesis; No diarrhea or constipation. No melena or hematochezia.  GENITOURINARY: No dysuria, frequency, hematuria, or incontinence  NEUROLOGICAL: No headaches, memory loss, loss of strength, numbness, or tremors  SKIN: No itching, burning, rashes, or lesions   LYMPH NODES: No enlarged glands  ENDOCRINE: No heat or cold intolerance; No hair loss  MUSCULOSKELETAL: No joint pain or swelling; No muscle, back, or extremity pain  PSYCHIATRIC: No depression, anxiety, mood swings, or difficulty sleeping  HEME/LYMPH: No easy bruising, or bleeding gums  ALLERY AND IMMUNOLOGIC: No hives or eczema    RADIOLOGY & ADDITIONAL TESTS:    Imaging Personally Reviewed:  [ ] YES  [ ] NO    Consultant(s) Notes Reviewed:  [ ] YES  [ ] NO    PHYSICAL EXAM:  GENERAL: NAD, well-groomed, well-developed  HEAD:  Atraumatic, Normocephalic  EYES: EOMI, PERRLA, conjunctiva and sclera clear  ENMT: No tonsillar erythema, exudates, or enlargement; Moist mucous membranes, Good dentition, No lesions  NECK: Supple, No JVD, Normal thyroid  NERVOUS SYSTEM:  Alert & Oriented X3, Good concentration; Motor Strength 5/5 B/L upper and lower extremities; DTRs 2+ intact and symmetric  CHEST/LUNG: Clear to percussion bilaterally; No rales, rhonchi, wheezing, or rubs  HEART: Regular rate and rhythm; No murmurs, rubs, or gallops  ABDOMEN: Soft, Nontender, Nondistended; Bowel sounds present  EXTREMITIES:  2+ Peripheral Pulses, No clubbing, cyanosis, or edema  LYMPH: No lymphadenopathy noted  SKIN: No rashes or lesions    LABS:                        10.2   6.43  )-----------( 362      ( 18 Aug 2020 04:57 )             30.4     08-18    140  |  104  |  12  ----------------------------<  128<H>  3.5   |  25  |  0.33<L>    Ca    9.0      18 Aug 2020 04:57  Mg     2.1     08-18    TPro  6.9  /  Alb  3.3  /  TBili  0.8  /  DBili  x   /  AST  196<H>  /  ALT  289<H>  /  AlkPhos  60  08-18    PT/INR - ( 18 Aug 2020 04:57 )   PT: 11.1 sec;   INR: 0.93 ratio         PTT - ( 18 Aug 2020 04:57 )  PTT:26.9 sec    CAPILLARY BLOOD GLUCOSE      POCT Blood Glucose.: 198 mg/dL (18 Aug 2020 16:58)  POCT Blood Glucose.: 128 mg/dL (18 Aug 2020 04:49)  POCT Blood Glucose.: 160 mg/dL (18 Aug 2020 00:21)  POCT Blood Glucose.: 139 mg/dL (17 Aug 2020 22:04)            MEDICATIONS  (STANDING):  dextrose 5%. 1000 milliLiter(s) (50 mL/Hr) IV Continuous <Continuous>  dextrose 50% Injectable 12.5 Gram(s) IV Push once  dextrose 50% Injectable 25 Gram(s) IV Push once  dextrose 50% Injectable 25 Gram(s) IV Push once  insulin lispro (HumaLOG) corrective regimen sliding scale   SubCutaneous every 6 hours  lactated ringers. 1000 milliLiter(s) (75 mL/Hr) IV Continuous <Continuous>  latanoprost 0.005% Ophthalmic Solution 1 Drop(s) Both EYES at bedtime  pantoprazole    Tablet 40 milliGRAM(s) Oral before breakfast  predniSONE   Tablet 50 milliGRAM(s) Oral daily  timolol 0.5% Solution 1 Drop(s) Both EYES daily    MEDICATIONS  (PRN):  dextrose 40% Gel 15 Gram(s) Oral once PRN Blood Glucose LESS THAN 70 milliGRAM(s)/deciliter  glucagon  Injectable 1 milliGRAM(s) IntraMuscular once PRN Glucose LESS THAN 70 milligrams/deciliter      Care Discussed with Consultants/Other Providers [ ] YES  [ ] NO
SURGERY DAILY PROGRESS NOTE:     SUBJECTIVE/24hr Events:     Patient seen and examined on am rounds. No acute events overnight.       OBJECTIVE:    MEDICATIONS  (STANDING):  dextrose 5%. 1000 milliLiter(s) (50 mL/Hr) IV Continuous <Continuous>  dextrose 50% Injectable 12.5 Gram(s) IV Push once  dextrose 50% Injectable 25 Gram(s) IV Push once  dextrose 50% Injectable 25 Gram(s) IV Push once  insulin lispro (HumaLOG) corrective regimen sliding scale   SubCutaneous every 6 hours  lactated ringers. 1000 milliLiter(s) (75 mL/Hr) IV Continuous <Continuous>  latanoprost 0.005% Ophthalmic Solution 1 Drop(s) Both EYES at bedtime  pantoprazole    Tablet 40 milliGRAM(s) Oral before breakfast  predniSONE   Tablet 50 milliGRAM(s) Oral daily  sodium chloride 0.9%. 1000 milliLiter(s) (50 mL/Hr) IV Continuous <Continuous>  timolol 0.5% Solution 1 Drop(s) Both EYES daily    MEDICATIONS  (PRN):  dextrose 40% Gel 15 Gram(s) Oral once PRN Blood Glucose LESS THAN 70 milliGRAM(s)/deciliter  glucagon  Injectable 1 milliGRAM(s) IntraMuscular once PRN Glucose LESS THAN 70 milligrams/deciliter  HYDROmorphone  Injectable 0.25 milliGRAM(s) IV Push every 10 minutes PRN Moderate Pain (4 - 6)      Vital Signs Last 24 Hrs  T(C): 36.9 (18 Aug 2020 10:39), Max: 36.9 (18 Aug 2020 10:25)  T(F): 98.4 (18 Aug 2020 10:25), Max: 98.4 (18 Aug 2020 10:25)  HR: 73 (18 Aug 2020 10:39) (67 - 75)  BP: 142/85 (18 Aug 2020 10:39) (127/76 - 152/89)  BP(mean): --  RR: 18 (18 Aug 2020 10:39) (18 - 19)  SpO2: 100% (18 Aug 2020 10:39) (96% - 100%)      I&O's Detail    17 Aug 2020 07:01  -  18 Aug 2020 07:00  --------------------------------------------------------  IN:    Oral Fluid: 780 mL    sodium chloride 0.9%: 2250 mL    sodium chloride 0.9%.: 300 mL  Total IN: 3330 mL    OUT:  Total OUT: 0 mL    Total NET: 3330 mL            Daily Height in cm: 157.48 (18 Aug 2020 10:39)    Daily Weight in k.9 (17 Aug 2020 15:31)          LABS:                        10.2   6.43  )-----------( 362      ( 18 Aug 2020 04:57 )             30.4     08-18    140  |  104  |  12  ----------------------------<  128<H>  3.5   |  25  |  0.33<L>    Ca    9.0      18 Aug 2020 04:57  Mg     2.1     08-18    TPro  6.9  /  Alb  3.3  /  TBili  0.8  /  DBili  x   /  AST  196<H>  /  ALT  289<H>  /  AlkPhos  60  08-18    PT/INR - ( 18 Aug 2020 04:57 )   PT: 11.1 sec;   INR: 0.93 ratio         PTT - ( 18 Aug 2020 04:57 )  PTT:26.9 sec              PHYSICAL EXAM:  Constitutional: well developed, well nourished, NAD  ENMT: normal facies, symmetric  Respiratory: Normal respiratory effort   Cardiovascular: pulse regularly present.
SURGERY DAILY PROGRESS NOTE:     SUBJECTIVE/24hr Events:     Patient seen and examined on am rounds. POD1 s/p left quadriceps bx. No acute events overnight. Pt feels well. Pain well controlled. Ambulating. Denies chest pain, shortness of breath, nausea, vomiting, fever chills.     OBJECTIVE:    MEDICATIONS  (STANDING):  dextrose 5%. 1000 milliLiter(s) (50 mL/Hr) IV Continuous <Continuous>  dextrose 50% Injectable 12.5 Gram(s) IV Push once  dextrose 50% Injectable 25 Gram(s) IV Push once  dextrose 50% Injectable 25 Gram(s) IV Push once  insulin lispro (HumaLOG) corrective regimen sliding scale   SubCutaneous three times a day before meals  insulin lispro (HumaLOG) corrective regimen sliding scale   SubCutaneous at bedtime  lactated ringers. 1000 milliLiter(s) (75 mL/Hr) IV Continuous <Continuous>  latanoprost 0.005% Ophthalmic Solution 1 Drop(s) Both EYES at bedtime  pantoprazole    Tablet 40 milliGRAM(s) Oral before breakfast  predniSONE   Tablet 50 milliGRAM(s) Oral daily  timolol 0.5% Solution 1 Drop(s) Both EYES daily    MEDICATIONS  (PRN):  dextrose 40% Gel 15 Gram(s) Oral once PRN Blood Glucose LESS THAN 70 milliGRAM(s)/deciliter  glucagon  Injectable 1 milliGRAM(s) IntraMuscular once PRN Glucose LESS THAN 70 milligrams/deciliter      Vital Signs Last 24 Hrs  T(C): 36.6 (19 Aug 2020 05:13), Max: 37.3 (19 Aug 2020 00:34)  T(F): 97.8 (19 Aug 2020 05:13), Max: 99.1 (19 Aug 2020 00:34)  HR: 79 (19 Aug 2020 05:13) (66 - 82)  BP: 103/65 (19 Aug 2020 05:13) (102/65 - 143/83)  BP(mean): 95 (18 Aug 2020 13:45) (86 - 95)  RR: 18 (19 Aug 2020 05:13) (16 - 18)  SpO2: 98% (19 Aug 2020 05:13) (96% - 100%)      I&O's Detail    18 Aug 2020 07:01  -  19 Aug 2020 07:00  --------------------------------------------------------  IN:    lactated ringers.: 150 mL    Oral Fluid: 460 mL  Total IN: 610 mL    OUT:    Voided: 500 mL  Total OUT: 500 mL    Total NET: 110 mL            Daily Height in cm: 157.48 (18 Aug 2020 10:39)    Daily           LABS:                        10.2   6.43  )-----------( 362      ( 18 Aug 2020 04:57 )             30.4     08-18    140  |  104  |  12  ----------------------------<  128<H>  3.5   |  25  |  0.33<L>    Ca    9.0      18 Aug 2020 04:57  Mg     2.1     08-18    TPro  6.9  /  Alb  3.3  /  TBili  0.8  /  DBili  x   /  AST  196<H>  /  ALT  289<H>  /  AlkPhos  60  08-18    PT/INR - ( 18 Aug 2020 04:57 )   PT: 11.1 sec;   INR: 0.93 ratio         PTT - ( 18 Aug 2020 04:57 )  PTT:26.9 sec              PHYSICAL EXAM:  Constitutional: well developed, well nourished, NAD  ENMT: normal facies, symmetric  Respiratory: Normal respiratory effort   Extremities: LLE dressing in place c/d/i. No surrounding swelling or erythema.

## 2020-08-19 NOTE — PROGRESS NOTE ADULT - ASSESSMENT
59y/o F PMHx of T2DM, HLD, HTN, and glaucoma presenting with 1 month fatigue and proximal muscle weakness with elevated AST/ALT and CK admitted with Rhabdomyolysis.     1. Rhabdomyolysis, Myositis   -likely combination of herbal remedy ( black fungus) and statin  -ck  remains elevated but improving, continue to trend  -cv stable, no cp/sob, no evidence of acute ischemia on EKG   -on PO prednisone, s/p IVIG   -statin on hold    -MRI showing myositis and muscle edema  -thyroid US with bl small thyroid nodule , CT A/P unremarkable   -s/p muscle biopsy   -Rheum/ med f/u     2. HTN   bp stable off meds     3. HLD   statin on hold in setting of rhabdomyolysis   daily CK, LFTs     4. LE edema  no decomp CHF, continue to monitor  consider dopplers if worsens   Echo with normal lv function, valve fxn    dvt ppx   dcp per primary team

## 2020-08-19 NOTE — DISCHARGE NOTE NURSING/CASE MANAGEMENT/SOCIAL WORK - PATIENT PORTAL LINK FT
You can access the FollowMyHealth Patient Portal offered by [facility] by registering at the following website: [email or web address]. By joining [facility]’s FollowMyHealth portal, you will also be able to view your health information using other applications (apps) compatible with our system.

## 2020-08-19 NOTE — PROGRESS NOTE ADULT - PROVIDER SPECIALTY LIST ADULT
Cardiology
Internal Medicine
Rheumatology
Surgery
Surgery
Cardiology

## 2020-08-19 NOTE — PROGRESS NOTE ADULT - ATTENDING COMMENTS
Agree with above NP note.  cv stable  cont current tx  await biopsy
Agree with above NP note.  cv stable  cont current tx  s/p biopsy
Agree with above NP note.  cv stable  cont current tx  s/p biopsy
Agree with above NP note.  cv stable  ivig for myositis  cont steroids per rheum/med  trend lft's, ck  check echo
Agree with above NP note.  cv stable  rhabdo, myositis  imaging noted  workup per med/rheum  check echo, r/o infiltrative myopathy  tx per med/rheum
need muscle biopsy prior to d/c home . if develop SOB then decrease IV fluids .  also increase her coverage for Insulin if sugars are high 2/2 steroids
need muscle biopsy prior to d/c home . if develop SOB then decrease IV fluids .  also increase her coverage for Insulin if sugars are high 2/2 steroids
s/p muscle biopsy . d/c fluids from am , can be d/c home. f/u with rheumatology   d/c home on current dose of prednisone
scheduled for muscle biopsy in am , NPO after midnight , medically stable with low cardiovascular risk for the procedure

## 2020-08-19 NOTE — PROGRESS NOTE ADULT - ASSESSMENT
60 year old female with new onset proximal muscle weakness concerning for myositis. Now s/p left quadriceps biopsy.     Plan:   - Regular diet   - f/u am labs   - pain control PRN  - Care per primary team   - Please page with additional questions or concerns    Ana Mata PGY2   Red Surgery   [id number] 60 year old female with new onset proximal muscle weakness concerning for myositis. Now s/p left quadriceps biopsy.     Plan:   - Regular diet   - f/u am labs   - pain control PRN  - Care per primary team   - Patient can remove dressing this afternoon or tomorrow morning. Steri strips to remain in place for one week.  - Please page with additional questions or concerns. Will sign off at this time.    Ana Mata PGY2   Red Surgery   [id number]

## 2020-08-19 NOTE — PROGRESS NOTE ADULT - ASSESSMENT
61y/o F PMHx of T2DM, HLD, HTN, and glaucoma presenting with 1 month fatigue and proximal muscle weakness with elevated AST/ALT and CK. 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 Myositis confirmed on MRI of lower extremities showing muscle edema.     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 Rosa-1 antibody negative.  - Myomarker panel pending but and HMB-CoA reductase Ab was positive.  - CK level now down in the 5000s, was around 16k earlier.    - Pt had biopsy of left thigh, results pending.   - IVIG 1g/kg completed on 8/14.   - Continue prednisone 1 mg/kg (50 mg daily), started on 8/13.   - Patient will follow up with Dr. Villeda on 8/26/20 at [address] [place] [address], [address], in [place].   - Discharge on Bactrim DS three times/weekly due to pt being on prolonged high dose steroids (for PCP prophylaxis).    Case discussed with Dr. Ronal Rao MD  Rheum fellow PGY 4  Pager [phone]

## 2020-08-19 NOTE — PROGRESS NOTE ADULT - REASON FOR ADMISSION
proximal muscle weakness and elevated CK

## 2020-08-21 PROBLEM — Z78.9 OTHER SPECIFIED HEALTH STATUS: Chronic | Status: ACTIVE | Noted: 2020-08-11

## 2020-08-24 LAB — PARANEOPLASTIC AB PNL SER: SIGNIFICANT CHANGE UP

## 2020-08-26 ENCOUNTER — APPOINTMENT (OUTPATIENT)
Dept: RHEUMATOLOGY | Facility: CLINIC | Age: 60
End: 2020-08-26
Payer: COMMERCIAL

## 2020-08-26 VITALS
HEART RATE: 90 BPM | HEIGHT: 60 IN | WEIGHT: 105 LBS | RESPIRATION RATE: 16 BRPM | SYSTOLIC BLOOD PRESSURE: 121 MMHG | DIASTOLIC BLOOD PRESSURE: 80 MMHG | BODY MASS INDEX: 20.62 KG/M2 | TEMPERATURE: 97.4 F | OXYGEN SATURATION: 98 %

## 2020-08-26 DIAGNOSIS — Z86.79 PERSONAL HISTORY OF OTHER DISEASES OF THE CIRCULATORY SYSTEM: ICD-10-CM

## 2020-08-26 DIAGNOSIS — Z13.820 ENCOUNTER FOR SCREENING FOR OSTEOPOROSIS: ICD-10-CM

## 2020-08-26 DIAGNOSIS — R74.01 ELEVATION OF LEVELS OF LIVER TRANSAMINASE LEVELS: ICD-10-CM

## 2020-08-26 LAB
EJ: NEGATIVE — SIGNIFICANT CHANGE UP
ENA JO1 AB SER IA-ACNC: <20 UNITS — SIGNIFICANT CHANGE UP
ENA PM/SCL AB SER-ACNC: <20 UNITS — SIGNIFICANT CHANGE UP
ENA SM+RNP AB SER IA-ACNC: <20 UNITS — SIGNIFICANT CHANGE UP
ENA SS-A IGG SER QL: <20 UNITS — SIGNIFICANT CHANGE UP
FIBRILLARIN AB SER QL: NEGATIVE — SIGNIFICANT CHANGE UP
KU AB SER QL: NEGATIVE — SIGNIFICANT CHANGE UP
MDA-5 (P140)(CADM-140): <20 UNITS — SIGNIFICANT CHANGE UP
MI2 AB SER QL: NEGATIVE — SIGNIFICANT CHANGE UP
NXP-2 (P140): <20 UNITS — SIGNIFICANT CHANGE UP
OJ AB SER QL: NEGATIVE — SIGNIFICANT CHANGE UP
PL12 AB SER QL: NEGATIVE — SIGNIFICANT CHANGE UP
PL7 AB SER QL: NEGATIVE — SIGNIFICANT CHANGE UP
SRP AB SERPL QL: NEGATIVE — SIGNIFICANT CHANGE UP
TIF GAMMA (P155/140): <20 UNITS — SIGNIFICANT CHANGE UP
U2 SNRNP AB SER QL: NEGATIVE — SIGNIFICANT CHANGE UP

## 2020-08-26 PROCEDURE — 99215 OFFICE O/P EST HI 40 MIN: CPT

## 2020-08-26 RX ORDER — TRAVOPROST 0.04 MG/ML
0 SOLUTION OPHTHALMIC
Refills: 0 | Status: ACTIVE | COMMUNITY

## 2020-08-26 RX ORDER — TIMOLOL MALEATE 5 MG/ML
0.5 SOLUTION OPHTHALMIC
Refills: 0 | Status: ACTIVE | COMMUNITY

## 2020-08-26 NOTE — HISTORY OF PRESENT ILLNESS
[___ Week(s) Ago] : [unfilled] week(s) ago [Currently Experiencing] : currently [Anorexia] : no anorexia [Weight Loss] : weight loss [Fever] : no fever [Malaise] : malaise [Fatigue] : fatigue [Chills] : no chills [Depression] : no depression [Malar Facial Rash] : no malar facial rash [Skin Lesions] : no lesions [Skin Nodules] : no skin nodules [Oral Ulcers] : no oral ulcers [Cough] : no cough [Dry Mouth] : no dry mouth [Dysphonia] : no dysphonia [Dysphagia] : no dysphagia [Shortness of Breath] : no shortness of breath [Chest Pain] : no chest pain [Arthralgias] : no arthralgias [Joint Swelling] : no joint swelling [Joint Deformity] : no joint deformity [Joint Warmth] : no joint warmth [Decreased ROM] : no decreased range of motion [Morning Stiffness] : no morning stiffness [Difficulty Walking] : no difficulty walking [Difficulty Standing] : no difficulty standing [Falls] : no falls [Muscle Weakness] : no muscle weakness [Dyspnea] : dyspnea [Myalgias] : no myalgias [Muscle Cramping] : no muscle cramping [Muscle Spasms] : no muscle spasms [Visual Changes] : no visual changes [Eye Pain] : no eye pain [Dry Eyes] : no dry eyes [FreeTextEntry1] : Still c/o weakness in the proximal muscles as well as dysphagia with solids. Also has some chest tightness on and off with difficulty taking a deep breath. No ILD on CT chest. ECHO was normal and recent EGD also normal.  [Eye Redness] : no eye redness

## 2020-08-26 NOTE — DATA REVIEWED
[FreeTextEntry1] : TTE (08/2020): 1. Normal mitral valve. Mild mitral regurgitation. 2. Normal trileaflet aortic valve. Minimal aortic regurgitation. 3. Hyperdynamic left ventricular systolic function.Estimated EF of 75-80%. 4. Normal diastolic function 5. Normal right ventricular size and function.TAPSE 2.11 cm. 6. Normal pericardium with no pericardial effusion. *** No previous Echo exam.\par \par CT Chest, abdomen and pelvis (08/2020): CHEST: LUNGS AND LARGE AIRWAYS: No endobronchial lesions. Clear lungs. PLEURA: No pleural effusion. VESSELS: Within normal limits. HEART: Heart size is normal. No pericardial effusion. MEDIASTINUM AND POLLY: No lymphadenopathy. CHEST WALL AND LOWER NECK: Within normal limits. ABDOMEN AND PELVIS: LIVER: Hepatic cyst. Subcentimeter liver lesion too small to characterize. BILE DUCTS: Normal caliber. GALLBLADDER: Within normal limits. SPLEEN: Within normal limits. PANCREAS: Within normal limits. ADRENALS: Within normal limits. KIDNEYS/URETERS: Nonobstructing left intrarenal calculus measuring 3 mm. No hydronephrosis. BLADDER: Within normal limits. REPRODUCTIVE ORGANS: Uterus and adnexa within normal limits. BOWEL: No bowel obstruction. Nonspecific thickening of the distal ascending and proximal to mid transverse colon, which may be on the basis of underdistention rather than colitis. Appendix is normal. PERITONEUM: No ascites. VESSELS: Mild calcification of the aorta. RETROPERITONEUM/LYMPH NODES: No lymphadenopathy. ABDOMINAL WALL: Within normal limits. BONES: Mild degenerative changes of the spine. IMPRESSION: No evidence of suspicious mass or lymphadenopathy in the chest, abdomen, or pelvis. \par \par MRI thighs (08/2020): OSSEOUS STRUCTURES  Fractures: None. HIP JOINTS  Articular surfaces: preserved given the large field of view imaging.  Effusion/Synovitis: None. MYOTENDINOUS STRUCTURES \par Relatively symmetric muscle edema is present involving the bilateral pelvic musculature including the iliopsoas, iliacus, obturator internus, adductors, and gluteus medius. Proximal rectus femoris and faint hamstring edema is also noted bilaterally. Changes are most pronounced within the bilateral iliopsoas and proximal rectus femoris muscles. There is associated postcontrast enhancement. No associated muscle atrophy is seen. NEUROVASCULAR STRUCTURES Appear preserved. OTHER SOFT TISSUES unremarkable. IMPRESSION: 1. Symmetric nonspecific myositis of the bilateral pelvic and hamstring muscles with greatest involvement of the iliopsoas and proximal rectus femoris muscles. \par \par US thyroid (08/2020): Right Lobe: 4.3 x 1.5 x 0.9 cm. Heterogeneous texture. Spongiform nodule interpolar region measures 1.1 x 0.8 x 0.6 m. Left Lobe: 3.9 x 1.5 x 0.9 cm. Heterogeneous texture. Several subcentimeter colloid nodules. Isthmus: 1 mm. No nodule Cervical Lymph Nodes: Lymph node inferior to left thyroid lobe measures 1.4 x 0.5 cm. IMPRESSION: Bilateral small thyroid nodules of low sonographic suspicion. \par

## 2020-08-26 NOTE — ASSESSMENT
[FreeTextEntry1] : 60 year old female with recently diagnosed statin induced myositis here for follow up\par \par 1. 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 Elevated HMGCoA Reductase antibody > 200. 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 On high dose Prednisone with improving CPK and LFTs. Also received IVIg during her hospitalization. Will set up form home infusion monthly. Repeat labs today. \par \par 2. CT chest, abdomen and pelvis w/o ILD or evidence of malignancy. US thyroid also negative for malignancy but has thyroid nodules. WIll f/u with Endocrine.  \par \par 3. HCM: On Bactrim for PCP prophylaxis. Hep and Quant TB done today\par \par 4. Bone health: Ca+D as needed. Needs baseline DEXA. Ordered today. BPP if on steroids > 3 months \par \par Follow up in 1 month

## 2020-08-26 NOTE — PHYSICAL EXAM
[General Appearance - Alert] : alert [General Appearance - In No Acute Distress] : in no acute distress [PERRL With Normal Accommodation] : pupils were equal in size, round, and reactive to light [Sclera] : the sclera and conjunctiva were normal [Extraocular Movements] : extraocular movements were intact [Oropharynx] : the oropharynx was normal [Neck Appearance] : the appearance of the neck was normal [Outer Ear] : the ears and nose were normal in appearance [Neck Cervical Mass (___cm)] : no neck mass was observed [Jugular Venous Distention Increased] : there was no jugular-venous distention [Thyroid Diffuse Enlargement] : the thyroid was not enlarged [Thyroid Nodule] : there were no palpable thyroid nodules [Auscultation Breath Sounds / Voice Sounds] : lungs were clear to auscultation bilaterally [Heart Sounds] : normal S1 and S2 [Heart Rate And Rhythm] : heart rate was normal and rhythm regular [Murmurs] : no murmurs [Heart Sounds Gallop] : no gallops [Heart Sounds Pericardial Friction Rub] : no pericardial rub [Full Pulse] : the pedal pulses are present [Edema] : there was no peripheral edema [Cervical Lymph Nodes Enlarged Anterior Bilaterally] : anterior cervical [Supraclavicular Lymph Nodes Enlarged Bilaterally] : supraclavicular [Cervical Lymph Nodes Enlarged Posterior Bilaterally] : posterior cervical [Axillary Lymph Nodes Enlarged Bilaterally] : axillary [No Spinal Tenderness] : no spinal tenderness [FreeTextEntry1] : proximal muscle weakness 4+/5 in the UE and 4/5 in the LE [No CVA Tenderness] : no ~M costovertebral angle tenderness [No Focal Deficits] : no focal deficits [] : no rash [Oriented To Time, Place, And Person] : oriented to person, place, and time [Impaired Insight] : insight and judgment were intact [Affect] : the affect was normal

## 2020-08-31 ENCOUNTER — TRANSCRIPTION ENCOUNTER (OUTPATIENT)
Age: 60
End: 2020-08-31

## 2020-08-31 LAB
25(OH)D3 SERPL-MCNC: 18.3 NG/ML
ALBUMIN SERPL ELPH-MCNC: 4.7 G/DL
ALP BLD-CCNC: 63 U/L
ALT SERPL-CCNC: 347 U/L
ANION GAP SERPL CALC-SCNC: 19 MMOL/L
AST SERPL-CCNC: 191 U/L
BASOPHILS # BLD AUTO: 0.01 K/UL
BASOPHILS NFR BLD AUTO: 0.1 %
BILIRUB SERPL-MCNC: 0.5 MG/DL
BUN SERPL-MCNC: 14 MG/DL
CALCIUM SERPL-MCNC: 10.3 MG/DL
CHLORIDE SERPL-SCNC: 95 MMOL/L
CK SERPL-CCNC: 6391 U/L
CO2 SERPL-SCNC: 22 MMOL/L
CREAT SERPL-MCNC: 0.39 MG/DL
EOSINOPHIL # BLD AUTO: 0.01 K/UL
EOSINOPHIL NFR BLD AUTO: 0.1 %
GLUCOSE SERPL-MCNC: 196 MG/DL
HAV IGM SER QL: NONREACTIVE
HBV CORE IGM SER QL: NONREACTIVE
HBV SURFACE AG SER QL: NONREACTIVE
HCT VFR BLD CALC: 43.1 %
HCV AB SER QL: NONREACTIVE
HCV S/CO RATIO: 0.1 S/CO
HGB BLD-MCNC: 13.5 G/DL
IMM GRANULOCYTES NFR BLD AUTO: 0.2 %
LYMPHOCYTES # BLD AUTO: 0.7 K/UL
LYMPHOCYTES NFR BLD AUTO: 7.2 %
M TB IFN-G BLD-IMP: NEGATIVE
MAN DIFF?: NORMAL
MCHC RBC-ENTMCNC: 30.3 PG
MCHC RBC-ENTMCNC: 31.3 GM/DL
MCV RBC AUTO: 96.9 FL
MONOCYTES # BLD AUTO: 0.05 K/UL
MONOCYTES NFR BLD AUTO: 0.5 %
MYOGLOBIN SERPL-MCNC: 1605 NG/ML
NEUTROPHILS # BLD AUTO: 8.97 K/UL
NEUTROPHILS NFR BLD AUTO: 91.9 %
PLATELET # BLD AUTO: 470 K/UL
POTASSIUM SERPL-SCNC: 5.2 MMOL/L
PROT SERPL-MCNC: 8.1 G/DL
QUANTIFERON TB PLUS MITOGEN MINUS NIL: 3.69 IU/ML
QUANTIFERON TB PLUS NIL: 0.01 IU/ML
QUANTIFERON TB PLUS TB1 MINUS NIL: 0 IU/ML
QUANTIFERON TB PLUS TB2 MINUS NIL: 0 IU/ML
RBC # BLD: 4.45 M/UL
RBC # FLD: 16 %
SODIUM SERPL-SCNC: 136 MMOL/L
WBC # FLD AUTO: 9.76 K/UL

## 2020-09-01 LAB — SURGICAL PATHOLOGY STUDY: SIGNIFICANT CHANGE UP

## 2020-09-23 ENCOUNTER — APPOINTMENT (OUTPATIENT)
Dept: RHEUMATOLOGY | Facility: CLINIC | Age: 60
End: 2020-09-23
Payer: COMMERCIAL

## 2020-09-23 VITALS
TEMPERATURE: 96.4 F | HEART RATE: 74 BPM | RESPIRATION RATE: 16 BRPM | BODY MASS INDEX: 22.38 KG/M2 | WEIGHT: 114 LBS | OXYGEN SATURATION: 98 % | SYSTOLIC BLOOD PRESSURE: 120 MMHG | HEIGHT: 60 IN | DIASTOLIC BLOOD PRESSURE: 77 MMHG

## 2020-09-23 PROCEDURE — 99214 OFFICE O/P EST MOD 30 MIN: CPT

## 2020-09-23 NOTE — REVIEW OF SYSTEMS
[Feeling Poorly] : feeling poorly [Feeling Tired] : feeling tired [Recent Weight Loss (___ Lbs)] : recent [unfilled] ~Ulb weight loss [As Noted in HPI] : as noted in HPI [Limb Weakness] : limb weakness [Difficulty Walking] : difficulty walking [Negative] : Endocrine [Fever] : no fever [Chills] : no chills [Recent Weight Gain (___ Lbs)] : no recent weight gain [Confused] : no confusion [Convulsions] : no convulsions [Dizziness] : no dizziness [Fainting] : no fainting [FreeTextEntry4] : dysphagia +

## 2020-09-23 NOTE — ASSESSMENT
[FreeTextEntry1] : 60 year old female with recently diagnosed statin induced myositis here for follow up\par \par 1. 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 Elevated HMGCoA Reductase antibody > 200. 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 On high dose Prednisone with improving CPK and LFTs. Also received IVIg during her hospitalization. Will set up for home infusion monthly. Repeat labs today. Continue prednisone taper - 30 mg daily for 2 weeks then 20 mg daily for 2 weeks then 15 mg daily. \par \par 2. CT chest, abdomen and pelvis w/o ILD or evidence of malignancy. US thyroid also negative for malignancy but has thyroid nodules. WIll f/u with Endocrine.  \par \par 3. HCM: On Bactrim for PCP prophylaxis. Hep and Quant TB done negative in August 2020. \par \par 4. Bone health: Ca+D as needed. Baseline DEXA reviewed. BPP if on steroids > 3 months \par \par Follow up in 1 month

## 2020-09-23 NOTE — HISTORY OF PRESENT ILLNESS
[___ Month(s) Ago] : [unfilled] month(s) ago [Currently Experiencing] : currently [Weight Loss] : weight loss [Malaise] : malaise [Fatigue] : fatigue [Dyspnea] : dyspnea [Anorexia] : no anorexia [Fever] : no fever [Chills] : no chills [Depression] : no depression [Malar Facial Rash] : no malar facial rash [Skin Lesions] : no lesions [Skin Nodules] : no skin nodules [Oral Ulcers] : no oral ulcers [Cough] : no cough [Dry Mouth] : no dry mouth [Dysphonia] : no dysphonia [Dysphagia] : no dysphagia [Shortness of Breath] : no shortness of breath [Chest Pain] : no chest pain [Arthralgias] : no arthralgias [Joint Swelling] : no joint swelling [Joint Warmth] : no joint warmth [Joint Deformity] : no joint deformity [Decreased ROM] : no decreased range of motion [Morning Stiffness] : no morning stiffness [Falls] : no falls [Difficulty Standing] : no difficulty standing [Difficulty Walking] : no difficulty walking [Myalgias] : no myalgias [Muscle Weakness] : no muscle weakness [Muscle Spasms] : no muscle spasms [Muscle Cramping] : no muscle cramping [Visual Changes] : no visual changes [Eye Pain] : no eye pain [Eye Redness] : no eye redness [Dry Eyes] : no dry eyes [FreeTextEntry1] : Feels better - upper body better than lower body with respect to weakness. Still has some difficulty swallowing. Currently on Prednisone 30 mg daily for 1 week - tapering by 10 mg every 2 weeks. Getting set up for home IVIg infusions - starting next week.

## 2020-09-23 NOTE — END OF VISIT
Patient Education     Blood in the Urine    Blood in the urine (hematuria) has many possible causes. If it occurs after an injury (such as a car accident or fall), it is most often a sign of bruising to the kidney or bladder. Common causes of blood in the urine include urinary tract infections, kidney stones, inflammation, tumors, or certain other diseases of the kidney or bladder. Menstruation can cause blood to appear in the urine sample, although it is not coming from the urinary tract.  If only a trace amount of blood is present, it will show up on the urine test, even though the urine may be yellow and not pink or red. This may occur with any of the above conditions, as well as heavy exercise or high fever. In this case, your doctor may want to repeat the urine test on another day. This will show if the blood is still present. If it is, then other tests can be done to find out the cause.  Home care  Follow these home care guidelines:  · If your urine does not appear bloody (pink, brown or red) then you do not need to restrict your activity in any way.  · If you can see blood in your urine, rest and avoid heavy exertion until your next exam. Do not use aspirin, blood thinners, or anti-platelet or anti-inflammatory medicines. These include ibuprofen and naproxen. These thin the blood and may increase bleeding.  Follow-up care  Follow up with your healthcare provider, or as advised. If you were injured and had blood in your urine, you should have a repeat urine test in 1 to 2 days. Contact your doctor for this test.  A radiologist will review any X-rays that were taken. You will be told of any new findings that may affect your care.  When to seek medical advice  Call your healthcare provider right away if any of these occur:  · Bright red blood or blood clots in the urine (if you did not have this before)  · Weakness, dizziness or fainting  · New groin, abdominal, or back pain  · Fever of 100.4ºF (38ºC) or higher,  or as directed by your healthcare provider  · Repeated vomiting  · Bleeding from the nose or gums or easy bruising  Date Last Reviewed: 9/1/2016 © 2000-2018 [employer]. [address] [place] [address], [place] [address]. All rights reserved. This information is not intended as a substitute for professional medical care. Always follow your healthcare professional's instructions.            [Time Spent: ___ minutes] : I have spent [unfilled] minutes of time on the encounter. [>50% of the face to face encounter time was spent on counseling and/or coordination of care for ___] : Greater than 50% of the face to face encounter time was spent on counseling and/or coordination of care for [unfilled]

## 2020-09-23 NOTE — PHYSICAL EXAM
[General Appearance - Alert] : alert [General Appearance - In No Acute Distress] : in no acute distress [Sclera] : the sclera and conjunctiva were normal [PERRL With Normal Accommodation] : pupils were equal in size, round, and reactive to light [Extraocular Movements] : extraocular movements were intact [Outer Ear] : the ears and nose were normal in appearance [Oropharynx] : the oropharynx was normal [Neck Appearance] : the appearance of the neck was normal [Neck Cervical Mass (___cm)] : no neck mass was observed [Jugular Venous Distention Increased] : there was no jugular-venous distention [Thyroid Diffuse Enlargement] : the thyroid was not enlarged [Thyroid Nodule] : there were no palpable thyroid nodules [Auscultation Breath Sounds / Voice Sounds] : lungs were clear to auscultation bilaterally [Heart Rate And Rhythm] : heart rate was normal and rhythm regular [Heart Sounds] : normal S1 and S2 [Heart Sounds Gallop] : no gallops [Murmurs] : no murmurs [Heart Sounds Pericardial Friction Rub] : no pericardial rub [Full Pulse] : the pedal pulses are present [Edema] : there was no peripheral edema [Cervical Lymph Nodes Enlarged Posterior Bilaterally] : posterior cervical [Cervical Lymph Nodes Enlarged Anterior Bilaterally] : anterior cervical [Supraclavicular Lymph Nodes Enlarged Bilaterally] : supraclavicular [Axillary Lymph Nodes Enlarged Bilaterally] : axillary [No CVA Tenderness] : no ~M costovertebral angle tenderness [No Spinal Tenderness] : no spinal tenderness [] : no rash [No Focal Deficits] : no focal deficits [Oriented To Time, Place, And Person] : oriented to person, place, and time [Impaired Insight] : insight and judgment were intact [Affect] : the affect was normal [FreeTextEntry1] : proximal muscle weakness 4+/5 in the UE and 4+/5 in the LE to adduction and 4/5 flexion, unable to raise the leg against gravity. Also unable to sit up unassisted from lying position. Preserved thigh abduction.

## 2020-09-24 LAB
25(OH)D3 SERPL-MCNC: 15.3 NG/ML
ALBUMIN SERPL ELPH-MCNC: 4.4 G/DL
ALP BLD-CCNC: 57 U/L
ALT SERPL-CCNC: 177 U/L
ANION GAP SERPL CALC-SCNC: 16 MMOL/L
AST SERPL-CCNC: 88 U/L
BASOPHILS # BLD AUTO: 0.03 K/UL
BASOPHILS NFR BLD AUTO: 0.3 %
BILIRUB SERPL-MCNC: 0.3 MG/DL
BUN SERPL-MCNC: 13 MG/DL
CALCIUM SERPL-MCNC: 9.4 MG/DL
CHLORIDE SERPL-SCNC: 101 MMOL/L
CHOLEST SERPL-MCNC: 305 MG/DL
CHOLEST/HDLC SERPL: 3.3 RATIO
CK SERPL-CCNC: 3545 U/L
CO2 SERPL-SCNC: 25 MMOL/L
CREAT SERPL-MCNC: 0.56 MG/DL
EOSINOPHIL # BLD AUTO: 0.01 K/UL
EOSINOPHIL NFR BLD AUTO: 0.1 %
ESTIMATED AVERAGE GLUCOSE: 134 MG/DL
GLUCOSE SERPL-MCNC: 169 MG/DL
HBA1C MFR BLD HPLC: 6.3 %
HCT VFR BLD CALC: 44.9 %
HDLC SERPL-MCNC: 93 MG/DL
HGB BLD-MCNC: 14 G/DL
IMM GRANULOCYTES NFR BLD AUTO: 0.2 %
LDLC SERPL CALC-MCNC: 145 MG/DL
LYMPHOCYTES # BLD AUTO: 1.11 K/UL
LYMPHOCYTES NFR BLD AUTO: 10.9 %
MAN DIFF?: NORMAL
MCHC RBC-ENTMCNC: 31.2 GM/DL
MCHC RBC-ENTMCNC: 31.7 PG
MCV RBC AUTO: 101.6 FL
MONOCYTES # BLD AUTO: 0.06 K/UL
MONOCYTES NFR BLD AUTO: 0.6 %
MYOGLOBIN SERPL-MCNC: 938 NG/ML
NEUTROPHILS # BLD AUTO: 8.96 K/UL
NEUTROPHILS NFR BLD AUTO: 87.9 %
PLATELET # BLD AUTO: 309 K/UL
POTASSIUM SERPL-SCNC: 4.4 MMOL/L
PROT SERPL-MCNC: 6.5 G/DL
RBC # BLD: 4.42 M/UL
RBC # FLD: 16.1 %
SODIUM SERPL-SCNC: 141 MMOL/L
TRIGL SERPL-MCNC: 334 MG/DL
WBC # FLD AUTO: 10.19 K/UL

## 2020-10-01 PROBLEM — R74.01 ELEVATED ALANINE AMINOTRANSFERASE (ALT) LEVEL: Status: ACTIVE | Noted: 2020-08-26

## 2020-10-20 ENCOUNTER — APPOINTMENT (OUTPATIENT)
Dept: RHEUMATOLOGY | Facility: CLINIC | Age: 60
End: 2020-10-20
Payer: COMMERCIAL

## 2020-10-20 VITALS
BODY MASS INDEX: 22.97 KG/M2 | OXYGEN SATURATION: 98 % | HEART RATE: 76 BPM | RESPIRATION RATE: 16 BRPM | DIASTOLIC BLOOD PRESSURE: 78 MMHG | SYSTOLIC BLOOD PRESSURE: 117 MMHG | TEMPERATURE: 96.9 F | WEIGHT: 117 LBS | HEIGHT: 60 IN

## 2020-10-20 PROCEDURE — 99214 OFFICE O/P EST MOD 30 MIN: CPT | Mod: 25

## 2020-10-20 PROCEDURE — 99072 ADDL SUPL MATRL&STAF TM PHE: CPT

## 2020-10-23 LAB
ALBUMIN SERPL ELPH-MCNC: 4.5 G/DL
ALP BLD-CCNC: 60 U/L
ALT SERPL-CCNC: 72 U/L
ANION GAP SERPL CALC-SCNC: 13 MMOL/L
AST SERPL-CCNC: 47 U/L
BASOPHILS # BLD AUTO: 0.03 K/UL
BASOPHILS NFR BLD AUTO: 0.6 %
BILIRUB SERPL-MCNC: 0.5 MG/DL
BUN SERPL-MCNC: 10 MG/DL
CALCIUM SERPL-MCNC: 9.5 MG/DL
CHLORIDE SERPL-SCNC: 101 MMOL/L
CHOLEST SERPL-MCNC: 313 MG/DL
CK SERPL-CCNC: 1838 U/L
CO2 SERPL-SCNC: 26 MMOL/L
CREAT SERPL-MCNC: 0.44 MG/DL
EOSINOPHIL # BLD AUTO: 0.07 K/UL
EOSINOPHIL NFR BLD AUTO: 1.5 %
GLUCOSE SERPL-MCNC: 132 MG/DL
HCT VFR BLD CALC: 48.5 %
HDLC SERPL-MCNC: 87 MG/DL
HGB BLD-MCNC: 14.5 G/DL
IMM GRANULOCYTES NFR BLD AUTO: 0.2 %
LDLC SERPL CALC-MCNC: 167 MG/DL
LYMPHOCYTES # BLD AUTO: 1.44 K/UL
LYMPHOCYTES NFR BLD AUTO: 31 %
MAN DIFF?: NORMAL
MCHC RBC-ENTMCNC: 29.9 GM/DL
MCHC RBC-ENTMCNC: 31.2 PG
MCV RBC AUTO: 104.3 FL
MONOCYTES # BLD AUTO: 0.24 K/UL
MONOCYTES NFR BLD AUTO: 5.2 %
MYOGLOBIN SERPL-MCNC: 354 NG/ML
NEUTROPHILS # BLD AUTO: 2.85 K/UL
NEUTROPHILS NFR BLD AUTO: 61.5 %
NONHDLC SERPL-MCNC: 226 MG/DL
PLATELET # BLD AUTO: 268 K/UL
POTASSIUM SERPL-SCNC: 4.6 MMOL/L
PROT SERPL-MCNC: 8.1 G/DL
RBC # BLD: 4.65 M/UL
RBC # FLD: 15.8 %
SODIUM SERPL-SCNC: 140 MMOL/L
TRIGL SERPL-MCNC: 295 MG/DL
WBC # FLD AUTO: 4.64 K/UL

## 2020-11-06 NOTE — DISCHARGE NOTE PROVIDER - [id number]_ALL_CORE_FT
PRINCIPAL DISCHARGE DIAGNOSIS  Diagnosis: Rhabdomyolysis  Assessment and Plan of Treatment: improved with IV fluid  seen by rheumatology  work up in progress PRINCIPAL DISCHARGE DIAGNOSIS  Diagnosis: Rhabdomyolysis  Assessment and Plan of Treatment: improved with IV fluid  seen by rheumatology  work up in progress      SECONDARY DISCHARGE DIAGNOSES  Diagnosis: Polymyositis  Assessment and Plan of Treatment: Polymyositis  Patient will follow up with Dr. Villeda on 8/26/20 at [address] [place] [address], [address], in [place]. (Time to be communicated to patient).   - Discharge on Bactrim DS three times/weekly due to pt being on prolonged high dose steroids (for PCP prophylaxis). Seizure disorder PRINCIPAL DISCHARGE DIAGNOSIS  Diagnosis: Rhabdomyolysis  Assessment and Plan of Treatment: improved with IV fluid  seen by rheumatology        SECONDARY DISCHARGE DIAGNOSES  Diagnosis: Polymyositis  Assessment and Plan of Treatment: Polymyositis  had IVIG on 8/13 and had left thigh muscle biopsy on 8/18; follow up biopsy result rheumatology  follow up with Dr. Villeda on 8/26/20 at [address] [place] [address], [address], in [place]. (Time to be communicated to patient).   - Discharge on Bactrim DS three times/weekly due to pt being on prolonged high dose steroids (for PCP prophylaxis).  steroid tapering dose by rheumatology; so please make sure to see rheumatologist on 8/26/20      Diagnosis: Type 2 diabetes mellitus  Assessment and Plan of Treatment: PRINCIPAL DISCHARGE DIAGNOSIS  Diagnosis: Rhabdomyolysis  Assessment and Plan of Treatment: improved with IV fluid  seen by rheumatology        SECONDARY DISCHARGE DIAGNOSES  Diagnosis: Polymyositis  Assessment and Plan of Treatment: Polymyositis  had IVIG on 8/13 and had left thigh muscle biopsy on 8/18; follow up biopsy result rheumatology  follow up with Dr. Villeda on 8/26/20 at [address] [place] [address], [address], in [place].   - Bactrim DS three times/weekly due to pt being on prolonged high dose steroids (for PCP prophylaxis).  steroid tapering dose by rheumatology; so please make sure to see rheumatologist on 8/26/20      Diagnosis: Type 2 diabetes mellitus  Assessment and Plan of Treatment: your hemoglobin A1c 7.0  diabetic diet  monitor finger stick  continue diabetic medication PRINCIPAL DISCHARGE DIAGNOSIS  Diagnosis: Rhabdomyolysis  Assessment and Plan of Treatment: improved with IV fluid  seen by rheumatology        SECONDARY DISCHARGE DIAGNOSES  Diagnosis: Polymyositis  Assessment and Plan of Treatment: Polymyositis  had IVIG  and had left thigh muscle biopsy on 8/18; follow up biopsy result rheumatology  follow up with Dr. Villeda on 8/26/20 at [address] [place] [address], [address], in [place].   - Bactrim DS three times/weekly due to pt being on prolonged high dose steroids (for PCP prophylaxis).  steroid tapering dose by rheumatology; so please make sure to see rheumatologist on 8/26/20      Diagnosis: Type 2 diabetes mellitus  Assessment and Plan of Treatment: your hemoglobin A1c 7.0  diabetic diet  monitor finger stick  continue diabetic medication

## 2020-12-22 ENCOUNTER — APPOINTMENT (OUTPATIENT)
Dept: RHEUMATOLOGY | Facility: CLINIC | Age: 60
End: 2020-12-22
Payer: COMMERCIAL

## 2020-12-22 VITALS
WEIGHT: 120 LBS | TEMPERATURE: 97.1 F | HEART RATE: 78 BPM | BODY MASS INDEX: 23.56 KG/M2 | OXYGEN SATURATION: 98 % | DIASTOLIC BLOOD PRESSURE: 71 MMHG | HEIGHT: 60 IN | RESPIRATION RATE: 14 BRPM | SYSTOLIC BLOOD PRESSURE: 119 MMHG

## 2020-12-22 DIAGNOSIS — R21 RASH AND OTHER NONSPECIFIC SKIN ERUPTION: ICD-10-CM

## 2020-12-22 PROCEDURE — 99072 ADDL SUPL MATRL&STAF TM PHE: CPT

## 2020-12-22 PROCEDURE — 99214 OFFICE O/P EST MOD 30 MIN: CPT

## 2020-12-22 RX ORDER — PANTOPRAZOLE 40 MG/1
40 TABLET, DELAYED RELEASE ORAL
Qty: 30 | Refills: 0 | Status: DISCONTINUED | COMMUNITY
Start: 2020-08-19 | End: 2020-12-22

## 2020-12-22 RX ORDER — CALCIUM CARBONATE/VITAMIN D3 600 MG-10
600-400 TABLET ORAL
Qty: 90 | Refills: 0 | Status: ACTIVE | COMMUNITY
Start: 2020-08-31

## 2020-12-22 RX ORDER — PANTOPRAZOLE SODIUM 40 MG/1
40 GRANULE, DELAYED RELEASE ORAL
Refills: 0 | Status: DISCONTINUED | COMMUNITY
End: 2020-12-22

## 2020-12-22 RX ORDER — PREDNISONE 10 MG/1
10 TABLET ORAL
Qty: 150 | Refills: 0 | Status: DISCONTINUED | COMMUNITY
Start: 2020-08-26 | End: 2020-12-22

## 2020-12-22 RX ORDER — EPINEPHRINE 1 MG/ML
1 INJECTION INTRAMUSCULAR; INTRAVENOUS; SUBCUTANEOUS
Qty: 2 | Refills: 0 | Status: DISCONTINUED | COMMUNITY
Start: 2020-09-09 | End: 2020-12-22

## 2020-12-22 RX ORDER — HYDROCORTISONE 25 MG/G
2.5 CREAM TOPICAL TWICE DAILY
Qty: 1 | Refills: 1 | Status: ACTIVE | COMMUNITY
Start: 2020-12-22 | End: 1900-01-01

## 2020-12-22 RX ORDER — SULFAMETHOXAZOLE AND TRIMETHOPRIM 800; 160 MG/1; MG/1
800-160 TABLET ORAL DAILY
Qty: 39 | Refills: 0 | Status: DISCONTINUED | COMMUNITY
End: 2020-12-22

## 2020-12-22 NOTE — PHYSICAL EXAM
[General Appearance - Alert] : alert [General Appearance - In No Acute Distress] : in no acute distress [Sclera] : the sclera and conjunctiva were normal [PERRL With Normal Accommodation] : pupils were equal in size, round, and reactive to light [Extraocular Movements] : extraocular movements were intact [Outer Ear] : the ears and nose were normal in appearance [Oropharynx] : the oropharynx was normal [Neck Appearance] : the appearance of the neck was normal [Neck Cervical Mass (___cm)] : no neck mass was observed [Jugular Venous Distention Increased] : there was no jugular-venous distention [Thyroid Diffuse Enlargement] : the thyroid was not enlarged [Thyroid Nodule] : there were no palpable thyroid nodules [Auscultation Breath Sounds / Voice Sounds] : lungs were clear to auscultation bilaterally [Heart Rate And Rhythm] : heart rate was normal and rhythm regular [Heart Sounds] : normal S1 and S2 [Heart Sounds Gallop] : no gallops [Murmurs] : no murmurs [Heart Sounds Pericardial Friction Rub] : no pericardial rub [Full Pulse] : the pedal pulses are present [Edema] : there was no peripheral edema [Cervical Lymph Nodes Enlarged Posterior Bilaterally] : posterior cervical [Cervical Lymph Nodes Enlarged Anterior Bilaterally] : anterior cervical [Supraclavicular Lymph Nodes Enlarged Bilaterally] : supraclavicular [Axillary Lymph Nodes Enlarged Bilaterally] : axillary [No CVA Tenderness] : no ~M costovertebral angle tenderness [No Spinal Tenderness] : no spinal tenderness [] : no rash [No Focal Deficits] : no focal deficits [Oriented To Time, Place, And Person] : oriented to person, place, and time [Impaired Insight] : insight and judgment were intact [Affect] : the affect was normal [FreeTextEntry1] : hyperpigmented macular rash over the neck folds

## 2020-12-22 NOTE — ASSESSMENT
[FreeTextEntry1] : 60 year old female with recently diagnosed statin induced myositis here for follow up\par \par 1. 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 Elevated HMGCoA Reductase antibody > 200. 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 Started on high dose Prednisone with improving CPK and LFTs - now tapered to Prednisone 2.5 mg daily for the past 1.5 weeks.  On IVIg home infusion bimonthly. Continue IVIg and taper Prednisone to 2.5 mg every other day for 2 weeks then discontinue.\par \par 2. CT chest, abdomen and pelvis w/o ILD or evidence of malignancy. US thyroid also negative for malignancy but has thyroid nodules. WIll f/u with Endocrine.  \par \par 3. Rash: likely contact dermatitis. Prescribed hydrocortisone 2.5% to be applied BID to the affected areas. Advised to call me if the rash recurs. \par \par 4. HCM: Off Bactrim. Hep and Quant TB done negative in August 2020. \par \par 5. Bone health: Ca+D as needed. Baseline DEXA reviewed. Coming off steroids so will hold off on BPP therapy.  \par \par Follow up in 2 months

## 2020-12-22 NOTE — HISTORY OF PRESENT ILLNESS
[___ Month(s) Ago] : [unfilled] month(s) ago [Currently Experiencing] : currently [Weight Loss] : weight loss [Malaise] : malaise [Fatigue] : fatigue [Dyspnea] : dyspnea [Anorexia] : no anorexia [Fever] : no fever [Chills] : no chills [Depression] : no depression [Malar Facial Rash] : no malar facial rash [Skin Lesions] : no lesions [Skin Nodules] : no skin nodules [Oral Ulcers] : no oral ulcers [Cough] : no cough [Dry Mouth] : no dry mouth [Dysphonia] : no dysphonia [Dysphagia] : no dysphagia [Shortness of Breath] : no shortness of breath [Chest Pain] : no chest pain [Arthralgias] : no arthralgias [Joint Swelling] : no joint swelling [Joint Warmth] : no joint warmth [Joint Deformity] : no joint deformity [Decreased ROM] : no decreased range of motion [Morning Stiffness] : no morning stiffness [Falls] : no falls [Difficulty Standing] : no difficulty standing [Difficulty Walking] : no difficulty walking [Myalgias] : no myalgias [Muscle Weakness] : no muscle weakness [Muscle Spasms] : no muscle spasms [Muscle Cramping] : no muscle cramping [Visual Changes] : no visual changes [Eye Pain] : no eye pain [Eye Redness] : no eye redness [Dry Eyes] : no dry eyes [FreeTextEntry1] : Strength is getting better as is her swallowing. Developed a new itchy rash on the neck and upper chest 2 weeks ago - improving now. Does not think its related to the infusion as it came almost a week after the infusion and she had another round of infusion last week without any worsening of her rash.

## 2020-12-23 LAB
ALBUMIN SERPL ELPH-MCNC: 3.9 G/DL
ALP BLD-CCNC: 81 U/L
ALT SERPL-CCNC: 17 U/L
ANION GAP SERPL CALC-SCNC: 12 MMOL/L
AST SERPL-CCNC: 21 U/L
BASOPHILS # BLD AUTO: 0.05 K/UL
BASOPHILS NFR BLD AUTO: 0.9 %
BILIRUB SERPL-MCNC: 0.8 MG/DL
BUN SERPL-MCNC: 12 MG/DL
CALCIUM SERPL-MCNC: 9.1 MG/DL
CHLORIDE SERPL-SCNC: 101 MMOL/L
CHOLEST SERPL-MCNC: 238 MG/DL
CK SERPL-CCNC: 373 U/L
CO2 SERPL-SCNC: 24 MMOL/L
CREAT SERPL-MCNC: 0.47 MG/DL
CRP SERPL-MCNC: 0.11 MG/DL
EOSINOPHIL # BLD AUTO: 0.06 K/UL
EOSINOPHIL NFR BLD AUTO: 1.1 %
ERYTHROCYTE [SEDIMENTATION RATE] IN BLOOD BY WESTERGREN METHOD: 53 MM/HR
ESTIMATED AVERAGE GLUCOSE: 120 MG/DL
GLUCOSE SERPL-MCNC: 141 MG/DL
HBA1C MFR BLD HPLC: 5.8 %
HCT VFR BLD CALC: 41.1 %
HDLC SERPL-MCNC: 83 MG/DL
HGB BLD-MCNC: 12.7 G/DL
IMM GRANULOCYTES NFR BLD AUTO: 0.4 %
LDLC SERPL CALC-MCNC: 128 MG/DL
LYMPHOCYTES # BLD AUTO: 1.14 K/UL
LYMPHOCYTES NFR BLD AUTO: 20.6 %
MAN DIFF?: NORMAL
MCHC RBC-ENTMCNC: 30.9 GM/DL
MCHC RBC-ENTMCNC: 31.3 PG
MCV RBC AUTO: 101.2 FL
MONOCYTES # BLD AUTO: 0.2 K/UL
MONOCYTES NFR BLD AUTO: 3.6 %
MYOGLOBIN SERPL-MCNC: 34 NG/ML
NEUTROPHILS # BLD AUTO: 4.07 K/UL
NEUTROPHILS NFR BLD AUTO: 73.4 %
NONHDLC SERPL-MCNC: 155 MG/DL
PLATELET # BLD AUTO: 244 K/UL
POTASSIUM SERPL-SCNC: 4.2 MMOL/L
PROT SERPL-MCNC: 8.6 G/DL
RBC # BLD: 4.06 M/UL
RBC # FLD: 14 %
SODIUM SERPL-SCNC: 137 MMOL/L
TRIGL SERPL-MCNC: 138 MG/DL
WBC # FLD AUTO: 5.54 K/UL

## 2021-02-23 ENCOUNTER — APPOINTMENT (OUTPATIENT)
Dept: RHEUMATOLOGY | Facility: CLINIC | Age: 61
End: 2021-02-23
Payer: COMMERCIAL

## 2021-02-23 VITALS
TEMPERATURE: 97.3 F | WEIGHT: 120 LBS | HEIGHT: 60 IN | BODY MASS INDEX: 23.56 KG/M2 | DIASTOLIC BLOOD PRESSURE: 72 MMHG | OXYGEN SATURATION: 98 % | RESPIRATION RATE: 14 BRPM | SYSTOLIC BLOOD PRESSURE: 119 MMHG | HEART RATE: 76 BPM

## 2021-02-23 PROCEDURE — 99072 ADDL SUPL MATRL&STAF TM PHE: CPT

## 2021-02-23 PROCEDURE — 99214 OFFICE O/P EST MOD 30 MIN: CPT

## 2021-02-23 RX ORDER — PREDNISONE 2.5 MG/1
2.5 TABLET ORAL
Qty: 180 | Refills: 0 | Status: DISCONTINUED | COMMUNITY
End: 2021-02-23

## 2021-02-23 RX ORDER — METFORMIN ER 500 MG 500 MG/1
500 TABLET ORAL
Qty: 180 | Refills: 0 | Status: ACTIVE | COMMUNITY
Start: 2020-05-01

## 2021-02-23 RX ORDER — METFORMIN HYDROCHLORIDE 500 MG/1
500 TABLET, COATED ORAL
Refills: 0 | Status: DISCONTINUED | COMMUNITY
End: 2021-02-23

## 2021-02-26 DIAGNOSIS — R79.89 OTHER SPECIFIED ABNORMAL FINDINGS OF BLOOD CHEMISTRY: ICD-10-CM

## 2021-02-26 LAB
25(OH)D3 SERPL-MCNC: 20.3 NG/ML
ALBUMIN SERPL ELPH-MCNC: 4.7 G/DL
ALP BLD-CCNC: 104 U/L
ALT SERPL-CCNC: 26 U/L
ANION GAP SERPL CALC-SCNC: 12 MMOL/L
AST SERPL-CCNC: 26 U/L
BASOPHILS # BLD AUTO: 0.05 K/UL
BASOPHILS NFR BLD AUTO: 1.3 %
BILIRUB SERPL-MCNC: 0.7 MG/DL
BUN SERPL-MCNC: 11 MG/DL
CALCIUM SERPL-MCNC: 10 MG/DL
CHLORIDE SERPL-SCNC: 97 MMOL/L
CHOLEST SERPL-MCNC: 255 MG/DL
CK SERPL-CCNC: 162 U/L
CO2 SERPL-SCNC: 25 MMOL/L
CREAT SERPL-MCNC: 0.49 MG/DL
EOSINOPHIL # BLD AUTO: 0.05 K/UL
EOSINOPHIL NFR BLD AUTO: 1.3 %
ESTIMATED AVERAGE GLUCOSE: 123 MG/DL
FERRITIN SERPL-MCNC: 418 NG/ML
GLUCOSE SERPL-MCNC: 126 MG/DL
HBA1C MFR BLD HPLC: 5.9 %
HCT VFR BLD CALC: 44.8 %
HDLC SERPL-MCNC: 76 MG/DL
HGB BLD-MCNC: 14.6 G/DL
IMM GRANULOCYTES NFR BLD AUTO: 0.3 %
LDLC SERPL CALC-MCNC: 138 MG/DL
LYMPHOCYTES # BLD AUTO: 1.32 K/UL
LYMPHOCYTES NFR BLD AUTO: 35 %
MAN DIFF?: NORMAL
MCHC RBC-ENTMCNC: 30.7 PG
MCHC RBC-ENTMCNC: 32.6 GM/DL
MCV RBC AUTO: 94.3 FL
MONOCYTES # BLD AUTO: 0.16 K/UL
MONOCYTES NFR BLD AUTO: 4.2 %
MYOGLOBIN SERPL-MCNC: <21 NG/ML
NEUTROPHILS # BLD AUTO: 2.18 K/UL
NEUTROPHILS NFR BLD AUTO: 57.9 %
NONHDLC SERPL-MCNC: 179 MG/DL
PLATELET # BLD AUTO: 236 K/UL
POTASSIUM SERPL-SCNC: 4.6 MMOL/L
PROT SERPL-MCNC: 9.4 G/DL
RBC # BLD: 4.75 M/UL
RBC # FLD: 12.9 %
SARS-COV-2 IGG SERPL IA-ACNC: 6.48 INDEX
SARS-COV-2 IGG SERPL QL IA: POSITIVE
SODIUM SERPL-SCNC: 135 MMOL/L
TRIGL SERPL-MCNC: 205 MG/DL
WBC # FLD AUTO: 3.77 K/UL

## 2021-02-26 RX ORDER — CHOLECALCIFEROL (VITAMIN D3) 1250 MCG
1.25 MG CAPSULE ORAL
Qty: 4 | Refills: 2 | Status: ACTIVE | COMMUNITY
Start: 2021-02-26 | End: 1900-01-01

## 2021-04-20 ENCOUNTER — APPOINTMENT (OUTPATIENT)
Dept: RHEUMATOLOGY | Facility: CLINIC | Age: 61
End: 2021-04-20
Payer: COMMERCIAL

## 2021-04-20 VITALS
BODY MASS INDEX: 24.15 KG/M2 | HEART RATE: 78 BPM | HEIGHT: 60 IN | OXYGEN SATURATION: 98 % | DIASTOLIC BLOOD PRESSURE: 73 MMHG | WEIGHT: 123 LBS | TEMPERATURE: 97.9 F | RESPIRATION RATE: 16 BRPM | SYSTOLIC BLOOD PRESSURE: 124 MMHG

## 2021-04-20 PROCEDURE — 99214 OFFICE O/P EST MOD 30 MIN: CPT

## 2021-04-20 PROCEDURE — 99072 ADDL SUPL MATRL&STAF TM PHE: CPT

## 2021-04-20 NOTE — REVIEW OF SYSTEMS
[Feeling Poorly] : feeling poorly [Feeling Tired] : feeling tired [Recent Weight Loss (___ Lbs)] : recent [unfilled] ~Ulb weight loss [As Noted in HPI] : as noted in HPI [Limb Weakness] : limb weakness [Difficulty Walking] : difficulty walking [Negative] : Endocrine [Fever] : no fever [Chills] : no chills [Recent Weight Gain (___ Lbs)] : no recent weight gain [Confused] : no confusion [Convulsions] : no convulsions [Dizziness] : no dizziness [FreeTextEntry4] : dysphagia +  [Fainting] : no fainting

## 2021-04-20 NOTE — HISTORY OF PRESENT ILLNESS
[___ Month(s) Ago] : [unfilled] month(s) ago 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 [IVIG] : IVIG [Upper extremity weakness] : upper extremity weakness [Lower extremities weakness] : lower extremities weakness [Currently Experiencing] : currently [Muscle Weakness] : muscle weakness [FreeTextEntry1] : Improved strength but continued to feel easily fatigued and also c/o knees giving way when she is walking. Does not want to go to PT because of the pendemic. Hesitant to take vaccination at this time. \par \par Had an itchy rash over the neck and upper chest wall that resolved with steroid cream.  [Anorexia] : no anorexia [Weight Loss] : no weight loss [Malaise] : no malaise [Fever] : no fever [Chills] : no chills [Fatigue] : no fatigue [Depression] : no depression [Malar Facial Rash] : no malar facial rash [Skin Lesions] : no lesions [Skin Nodules] : no skin nodules [Oral Ulcers] : no oral ulcers [Cough] : no cough [Dry Mouth] : no dry mouth [Dysphonia] : no dysphonia [Dysphagia] : no dysphagia [Shortness of Breath] : no shortness of breath [Chest Pain] : no chest pain [Arthralgias] : no arthralgias [Joint Warmth] : no joint warmth [Joint Swelling] : no joint swelling [Joint Deformity] : no joint deformity [Decreased ROM] : no decreased range of motion [Morning Stiffness] : no morning stiffness [Falls] : no falls [Difficulty Standing] : no difficulty standing [Difficulty Walking] : no difficulty walking [Dyspnea] : no dyspnea [Myalgias] : no myalgias [Muscle Spasms] : no muscle spasms [Muscle Cramping] : no muscle cramping [Visual Changes] : no visual changes [Eye Pain] : no eye pain [Eye Redness] : no eye redness [Dry Eyes] : no dry eyes [TextBox_2] : August 2020 [TextBox_19] : High dose Prednisone [TextBox_24] :  1. Symmetric nonspecific myositis of the bilateral pelvic and hamstring muscles with greatest involvement of the iliopsoas and proximal rectus femoris muscles.  [TextBox_27] : necrotizing myositis [TextBox_43] : Aug 2020 - clear lungs

## 2021-04-20 NOTE — ASSESSMENT
[FreeTextEntry1] : 60 year old female with recently diagnosed statin induced myositis here for follow up\par \par 1. 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 Elevated HMGCoA Reductase antibody > 200. 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 Started on high dose Prednisone with improving CPK and LFTs - now off Prednisone. On IVIg home infusion bimonthly. Continue IVIg and monitor labs. Consider tapering IVIg infusion to once every 2 months, if CPK continues to be normal. \par \par 2. CT chest, abdomen and pelvis w/o ILD or evidence of malignancy. US thyroid also negative for malignancy but has thyroid nodules. WIll f/u with Endocrine.  \par \par 3. Knee weakness: printed exercises for patient to do at home. \par \par 4. HCM: Off Bactrim. Hep and Quant TB done negative in August 2020. \par \par 5. Bone health: Ca+D as needed. Baseline DEXA reviewed. Off steroids so will hold off on BPP therapy.  \par \par Follow up in 2 months

## 2021-04-21 LAB
ALBUMIN SERPL ELPH-MCNC: 4.7 G/DL
ALP BLD-CCNC: 106 U/L
ALT SERPL-CCNC: 19 U/L
ANION GAP SERPL CALC-SCNC: 15 MMOL/L
AST SERPL-CCNC: 27 U/L
BASOPHILS # BLD AUTO: 0.06 K/UL
BASOPHILS NFR BLD AUTO: 1.8 %
BILIRUB SERPL-MCNC: 1.1 MG/DL
BUN SERPL-MCNC: 15 MG/DL
CALCIUM SERPL-MCNC: 9.9 MG/DL
CHLORIDE SERPL-SCNC: 100 MMOL/L
CK SERPL-CCNC: 164 U/L
CO2 SERPL-SCNC: 22 MMOL/L
COVID-19 NUCLEOCAPSID  GAM ANTIBODY INTERPRETATION: POSITIVE
COVID-19 SPIKE DOMAIN ANTIBODY INTERPRETATION: POSITIVE
CREAT SERPL-MCNC: 0.41 MG/DL
DEPRECATED KAPPA LC FREE/LAMBDA SER: 0.87 RATIO
EOSINOPHIL # BLD AUTO: 0.03 K/UL
EOSINOPHIL NFR BLD AUTO: 0.9 %
FERRITIN SERPL-MCNC: 407 NG/ML
GLUCOSE SERPL-MCNC: 145 MG/DL
HCT VFR BLD CALC: 43 %
HGB BLD-MCNC: 14.2 G/DL
IGA SER QL IEP: 268 MG/DL
IGG SER QL IEP: 3674 MG/DL
IGM SER QL IEP: 70 MG/DL
IMM GRANULOCYTES NFR BLD AUTO: 0 %
KAPPA LC CSF-MCNC: 1.14 MG/DL
KAPPA LC SERPL-MCNC: 0.99 MG/DL
LYMPHOCYTES # BLD AUTO: 1.21 K/UL
LYMPHOCYTES NFR BLD AUTO: 35.8 %
MAN DIFF?: NORMAL
MCHC RBC-ENTMCNC: 30.9 PG
MCHC RBC-ENTMCNC: 33 GM/DL
MCV RBC AUTO: 93.7 FL
MONOCYTES # BLD AUTO: 0.12 K/UL
MONOCYTES NFR BLD AUTO: 3.6 %
MYOGLOBIN SERPL-MCNC: <21 NG/ML
NEUTROPHILS # BLD AUTO: 1.96 K/UL
NEUTROPHILS NFR BLD AUTO: 57.9 %
PLATELET # BLD AUTO: 182 K/UL
POTASSIUM SERPL-SCNC: 4.2 MMOL/L
PROT SERPL-MCNC: 10.4 G/DL
RBC # BLD: 4.59 M/UL
RBC # FLD: 14.1 %
SARS-COV-2 AB SERPL IA-ACNC: 81.8 U/ML
SARS-COV-2 AB SERPL QL IA: 5.73 INDEX
SODIUM SERPL-SCNC: 136 MMOL/L
WBC # FLD AUTO: 3.38 K/UL

## 2021-04-22 LAB
ALBUMIN MFR SERPL ELPH: 46.5 %
ALBUMIN SERPL-MCNC: 4.9 G/DL
ALBUMIN/GLOB SERPL: 0.9 RATIO
ALPHA1 GLOB MFR SERPL ELPH: 2.3 %
ALPHA1 GLOB SERPL ELPH-MCNC: 0.2 G/DL
ALPHA2 GLOB MFR SERPL ELPH: 6.1 %
ALPHA2 GLOB SERPL ELPH-MCNC: 0.6 G/DL
B-GLOBULIN MFR SERPL ELPH: 9.4 %
B-GLOBULIN SERPL ELPH-MCNC: 1 G/DL
GAMMA GLOB FLD ELPH-MCNC: 3.8 G/DL
GAMMA GLOB MFR SERPL ELPH: 35.7 %
INTERPRETATION SERPL IEP-IMP: NORMAL
M PROTEIN SPEC IFE-MCNC: NORMAL
PROT SERPL-MCNC: 10.6 G/DL
PROT SERPL-MCNC: 10.6 G/DL

## 2021-04-23 LAB — IGE SER-MCNC: 143 KU/L

## 2021-04-28 ENCOUNTER — TRANSCRIPTION ENCOUNTER (OUTPATIENT)
Age: 61
End: 2021-04-28

## 2021-04-30 ENCOUNTER — TRANSCRIPTION ENCOUNTER (OUTPATIENT)
Age: 61
End: 2021-04-30

## 2021-06-10 ENCOUNTER — APPOINTMENT (OUTPATIENT)
Dept: CARDIOLOGY | Facility: CLINIC | Age: 61
End: 2021-06-10
Payer: COMMERCIAL

## 2021-06-10 ENCOUNTER — NON-APPOINTMENT (OUTPATIENT)
Age: 61
End: 2021-06-10

## 2021-06-10 VITALS
DIASTOLIC BLOOD PRESSURE: 90 MMHG | TEMPERATURE: 97.2 F | OXYGEN SATURATION: 99 % | BODY MASS INDEX: 24.41 KG/M2 | RESPIRATION RATE: 16 BRPM | SYSTOLIC BLOOD PRESSURE: 155 MMHG | WEIGHT: 125 LBS | HEART RATE: 85 BPM

## 2021-06-10 DIAGNOSIS — Z82.3 FAMILY HISTORY OF STROKE: ICD-10-CM

## 2021-06-10 DIAGNOSIS — Z82.49 FAMILY HISTORY OF ISCHEMIC HEART DISEASE AND OTHER DISEASES OF THE CIRCULATORY SYSTEM: ICD-10-CM

## 2021-06-10 DIAGNOSIS — R42 DIZZINESS AND GIDDINESS: ICD-10-CM

## 2021-06-10 DIAGNOSIS — R00.1 BRADYCARDIA, UNSPECIFIED: ICD-10-CM

## 2021-06-10 PROCEDURE — 93000 ELECTROCARDIOGRAM COMPLETE: CPT

## 2021-06-10 PROCEDURE — 99204 OFFICE O/P NEW MOD 45 MIN: CPT

## 2021-06-14 PROBLEM — Z82.3 FAMILY HISTORY OF CEREBROVASCULAR ACCIDENT (CVA): Status: ACTIVE | Noted: 2021-06-14

## 2021-06-14 PROBLEM — Z82.49 FAMILY HISTORY OF CARDIAC DISORDER: Status: ACTIVE | Noted: 2021-06-14

## 2021-06-14 NOTE — HISTORY OF PRESENT ILLNESS
[FreeTextEntry1] : 61 year old female with PMH of myositis, HTN, HLD and DM presents for evaluation of bradycardia. Pt reports bradycardia (HR 50s) since IVIG treatment. Pt feels lightheadedness. Pt also c/o throat tightness. ENT check-up was negative reportedly. Pt denies CP/SOB/palpitations. Pt denies h/o syncope.\par

## 2021-06-17 ENCOUNTER — NON-APPOINTMENT (OUTPATIENT)
Age: 61
End: 2021-06-17

## 2021-06-18 ENCOUNTER — NON-APPOINTMENT (OUTPATIENT)
Age: 61
End: 2021-06-18

## 2021-06-22 ENCOUNTER — APPOINTMENT (OUTPATIENT)
Dept: RHEUMATOLOGY | Facility: CLINIC | Age: 61
End: 2021-06-22
Payer: COMMERCIAL

## 2021-06-22 VITALS
HEART RATE: 82 BPM | WEIGHT: 125 LBS | BODY MASS INDEX: 24.54 KG/M2 | OXYGEN SATURATION: 99 % | TEMPERATURE: 97 F | DIASTOLIC BLOOD PRESSURE: 86 MMHG | RESPIRATION RATE: 16 BRPM | HEIGHT: 60 IN | SYSTOLIC BLOOD PRESSURE: 143 MMHG

## 2021-06-22 DIAGNOSIS — R73.03 PREDIABETES.: ICD-10-CM

## 2021-06-22 PROCEDURE — 99214 OFFICE O/P EST MOD 30 MIN: CPT | Mod: GC

## 2021-06-24 NOTE — HISTORY OF PRESENT ILLNESS
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 She is concerned about voice change but no one around her noticed it. denies any new muscle weakness, any muscle pain or any dysphagia, dyspnea, fever or chills.  [Anorexia] : no anorexia [Weight Loss] : no weight loss [Malaise] : no malaise [Fever] : no fever [Chills] : no chills [Fatigue] : no fatigue [Depression] : no depression [Malar Facial Rash] : no malar facial rash [Skin Lesions] : no lesions [Skin Nodules] : no skin nodules [Oral Ulcers] : no oral ulcers [Cough] : no cough [Dry Mouth] : no dry mouth [Dysphonia] : no dysphonia [Dysphagia] : no dysphagia [Shortness of Breath] : no shortness of breath [Chest Pain] : no chest pain [Arthralgias] : no arthralgias [Joint Swelling] : no joint swelling [Joint Warmth] : no joint warmth [Joint Deformity] : no joint deformity [Decreased ROM] : no decreased range of motion [Morning Stiffness] : no morning stiffness [Falls] : no falls [Difficulty Standing] : no difficulty standing [Difficulty Walking] : no difficulty walking [Dyspnea] : no dyspnea [Myalgias] : no myalgias [Muscle Spasms] : no muscle spasms [Muscle Cramping] : no muscle cramping [Visual Changes] : no visual changes [Eye Pain] : no eye pain [Eye Redness] : no eye redness [Dry Eyes] : no dry eyes [TextBox_2] : August 2020 [TextBox_19] : High dose Prednisone [TextBox_24] :  1. Symmetric nonspecific myositis of the bilateral pelvic and hamstring muscles with greatest involvement of the iliopsoas and proximal rectus femoris muscles.  [TextBox_27] : necrotizing myositis [TextBox_43] : Aug 2020 - clear lungs

## 2021-06-24 NOTE — ASSESSMENT
[FreeTextEntry1] : 61 year old female with recently diagnosed statin induced myositis here for follow up\par \par 1. 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 Elevated HMGCoA Reductase antibody > 200. 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 Started on high dose Prednisone with improving CPK and LFTs - now off Prednisone. On IVIg home infusion bimonthly. Seen by cardio, concerned if bradycardia caused by IVIG, since CK trended down nicely will taper IVIg infusion to once every month. follow with Cardio to repeat Holter\par \par 2. CT chest, abdomen and pelvis w/o ILD or evidence of malignancy. US thyroid also negative for malignancy but has thyroid nodules. WIll f/u with Endocrine.  \par \par 3. Knee weakness: improving, printed exercises for patient to do at home. \par \par 4. HCM: Off Bactrim. Hep and Quant TB done negative in August 2020. \par \par 5. Bone health: Ca+D as needed. Baseline DEXA reviewed. Off steroids so will hold off on BPP therapy.  \par \par Follow up in 2 months

## 2021-06-24 NOTE — PHYSICAL EXAM
[General Appearance - Alert] : alert [General Appearance - In No Acute Distress] : in no acute distress [Sclera] : the sclera and conjunctiva were normal [PERRL With Normal Accommodation] : pupils were equal in size, round, and reactive to light [Extraocular Movements] : extraocular movements were intact [Outer Ear] : the ears and nose were normal in appearance [Neck Appearance] : the appearance of the neck was normal [Oropharynx] : the oropharynx was normal [Neck Cervical Mass (___cm)] : no neck mass was observed [Jugular Venous Distention Increased] : there was no jugular-venous distention [Thyroid Diffuse Enlargement] : the thyroid was not enlarged [Thyroid Nodule] : there were no palpable thyroid nodules [Auscultation Breath Sounds / Voice Sounds] : lungs were clear to auscultation bilaterally [Heart Sounds] : normal S1 and S2 [Heart Rate And Rhythm] : heart rate was normal and rhythm regular [Heart Sounds Gallop] : no gallops [Murmurs] : no murmurs [Heart Sounds Pericardial Friction Rub] : no pericardial rub [Full Pulse] : the pedal pulses are present [Edema] : there was no peripheral edema [Cervical Lymph Nodes Enlarged Posterior Bilaterally] : posterior cervical [Cervical Lymph Nodes Enlarged Anterior Bilaterally] : anterior cervical [Supraclavicular Lymph Nodes Enlarged Bilaterally] : supraclavicular [Axillary Lymph Nodes Enlarged Bilaterally] : axillary [No CVA Tenderness] : no ~M costovertebral angle tenderness [No Spinal Tenderness] : no spinal tenderness [] : no rash [No Focal Deficits] : no focal deficits [Impaired Insight] : insight and judgment were intact [Oriented To Time, Place, And Person] : oriented to person, place, and time [Affect] : the affect was normal [Skin Color & Pigmentation] : normal skin color and pigmentation [FreeTextEntry1] : b/l  UE 5/5 and 5/5, b/l proximal  LE with 4+/5 flexors

## 2021-06-27 PROBLEM — R00.1 BRADYCARDIA: Status: ACTIVE | Noted: 2021-06-27

## 2021-06-28 LAB
25(OH)D3 SERPL-MCNC: 19.4 NG/ML
ALBUMIN SERPL ELPH-MCNC: 4.7 G/DL
ALP BLD-CCNC: 109 U/L
ALT SERPL-CCNC: 19 U/L
ANION GAP SERPL CALC-SCNC: 21 MMOL/L
AST SERPL-CCNC: 28 U/L
BASOPHILS # BLD AUTO: 0.03 K/UL
BASOPHILS NFR BLD AUTO: 0.7 %
BILIRUB SERPL-MCNC: 1.3 MG/DL
BUN SERPL-MCNC: 13 MG/DL
CALCIUM SERPL-MCNC: 10 MG/DL
CHLORIDE SERPL-SCNC: 99 MMOL/L
CK SERPL-CCNC: 212 U/L
CO2 SERPL-SCNC: 20 MMOL/L
CREAT SERPL-MCNC: 0.53 MG/DL
EOSINOPHIL # BLD AUTO: 0.01 K/UL
EOSINOPHIL NFR BLD AUTO: 0.2 %
ESTIMATED AVERAGE GLUCOSE: 117 MG/DL
FERRITIN SERPL-MCNC: 647 NG/ML
GLUCOSE SERPL-MCNC: 118 MG/DL
HBA1C MFR BLD HPLC: 5.7 %
HCT VFR BLD CALC: 42.3 %
HGB BLD-MCNC: 13.8 G/DL
IMM GRANULOCYTES NFR BLD AUTO: 0.2 %
LYMPHOCYTES # BLD AUTO: 1.32 K/UL
LYMPHOCYTES NFR BLD AUTO: 32.4 %
MAN DIFF?: NORMAL
MCHC RBC-ENTMCNC: 31.5 PG
MCHC RBC-ENTMCNC: 32.6 GM/DL
MCV RBC AUTO: 96.6 FL
MONOCYTES # BLD AUTO: 0.18 K/UL
MONOCYTES NFR BLD AUTO: 4.4 %
NEUTROPHILS # BLD AUTO: 2.52 K/UL
NEUTROPHILS NFR BLD AUTO: 62.1 %
PLATELET # BLD AUTO: 271 K/UL
POTASSIUM SERPL-SCNC: 4.8 MMOL/L
PROT SERPL-MCNC: 10.4 G/DL
RBC # BLD: 4.38 M/UL
RBC # FLD: 14.1 %
SODIUM SERPL-SCNC: 139 MMOL/L
TSH SERPL-ACNC: 1.27 UIU/ML
WBC # FLD AUTO: 4.07 K/UL

## 2021-07-16 ENCOUNTER — TRANSCRIPTION ENCOUNTER (OUTPATIENT)
Age: 61
End: 2021-07-16

## 2021-07-22 ENCOUNTER — NON-APPOINTMENT (OUTPATIENT)
Age: 61
End: 2021-07-22

## 2021-07-23 ENCOUNTER — TRANSCRIPTION ENCOUNTER (OUTPATIENT)
Age: 61
End: 2021-07-23

## 2021-07-23 LAB
ALBUMIN SERPL ELPH-MCNC: 5.1 G/DL
ALP BLD-CCNC: 128 U/L
ALT SERPL-CCNC: 16 U/L
ANION GAP SERPL CALC-SCNC: 16 MMOL/L
AST SERPL-CCNC: 21 U/L
BASOPHILS # BLD AUTO: 0.04 K/UL
BASOPHILS NFR BLD AUTO: 1 %
BILIRUB SERPL-MCNC: 0.8 MG/DL
BUN SERPL-MCNC: 12 MG/DL
CALCIUM SERPL-MCNC: 10 MG/DL
CHLORIDE SERPL-SCNC: 98 MMOL/L
CHOLEST SERPL-MCNC: 276 MG/DL
CK SERPL-CCNC: 209 U/L
CO2 SERPL-SCNC: 24 MMOL/L
COVID-19 NUCLEOCAPSID  GAM ANTIBODY INTERPRETATION: POSITIVE
COVID-19 SPIKE DOMAIN ANTIBODY INTERPRETATION: POSITIVE
CREAT SERPL-MCNC: 0.48 MG/DL
CRP SERPL-MCNC: <3 MG/L
EOSINOPHIL # BLD AUTO: 0.03 K/UL
EOSINOPHIL NFR BLD AUTO: 0.8 %
ERYTHROCYTE [SEDIMENTATION RATE] IN BLOOD BY WESTERGREN METHOD: 59 MM/HR
FERRITIN SERPL-MCNC: 444 NG/ML
GLUCOSE SERPL-MCNC: 133 MG/DL
HCT VFR BLD CALC: 46.1 %
HDLC SERPL-MCNC: 68 MG/DL
HGB BLD-MCNC: 14.8 G/DL
IMM GRANULOCYTES NFR BLD AUTO: 0.3 %
LDLC SERPL CALC-MCNC: 178 MG/DL
LYMPHOCYTES # BLD AUTO: 1.33 K/UL
LYMPHOCYTES NFR BLD AUTO: 34.8 %
MAN DIFF?: NORMAL
MCHC RBC-ENTMCNC: 30.7 PG
MCHC RBC-ENTMCNC: 32.1 GM/DL
MCV RBC AUTO: 95.6 FL
MONOCYTES # BLD AUTO: 0.14 K/UL
MONOCYTES NFR BLD AUTO: 3.7 %
MYOGLOBIN SERPL-MCNC: 24 NG/ML
NEUTROPHILS # BLD AUTO: 2.27 K/UL
NEUTROPHILS NFR BLD AUTO: 59.4 %
NONHDLC SERPL-MCNC: 207 MG/DL
PLATELET # BLD AUTO: 253 K/UL
POTASSIUM SERPL-SCNC: 4.5 MMOL/L
PROT SERPL-MCNC: 8.6 G/DL
RBC # BLD: 4.82 M/UL
RBC # FLD: 14.2 %
SARS-COV-2 AB SERPL IA-ACNC: 74.4 U/ML
SARS-COV-2 AB SERPL QL IA: 5.41 INDEX
SODIUM SERPL-SCNC: 138 MMOL/L
TRIGL SERPL-MCNC: 149 MG/DL
WBC # FLD AUTO: 3.82 K/UL

## 2021-07-28 NOTE — PACU DISCHARGE NOTE - NSPTMEETSDISCHCRITERIADT_GEN_A_CORE
no rashes , no suspicious lesions , no areas of discoloration , no jaundice present , good turgor , no masses , no tenderness on palpation
18-Aug-2020 13:34

## 2021-07-29 ENCOUNTER — TRANSCRIPTION ENCOUNTER (OUTPATIENT)
Age: 61
End: 2021-07-29

## 2021-08-31 ENCOUNTER — APPOINTMENT (OUTPATIENT)
Dept: RHEUMATOLOGY | Facility: CLINIC | Age: 61
End: 2021-08-31
Payer: COMMERCIAL

## 2021-08-31 VITALS
WEIGHT: 122 LBS | DIASTOLIC BLOOD PRESSURE: 79 MMHG | HEART RATE: 84 BPM | HEIGHT: 60 IN | SYSTOLIC BLOOD PRESSURE: 124 MMHG | TEMPERATURE: 97.2 F | BODY MASS INDEX: 23.95 KG/M2 | RESPIRATION RATE: 16 BRPM | OXYGEN SATURATION: 99 %

## 2021-08-31 PROCEDURE — 99214 OFFICE O/P EST MOD 30 MIN: CPT

## 2021-09-02 LAB
ALBUMIN SERPL ELPH-MCNC: 4.9 G/DL
ALP BLD-CCNC: 108 U/L
ALT SERPL-CCNC: 23 U/L
ANION GAP SERPL CALC-SCNC: 13 MMOL/L
AST SERPL-CCNC: 32 U/L
BASOPHILS # BLD AUTO: 0.04 K/UL
BASOPHILS NFR BLD AUTO: 0.8 %
BILIRUB SERPL-MCNC: 0.6 MG/DL
BUN SERPL-MCNC: 14 MG/DL
CALCIUM SERPL-MCNC: 9.7 MG/DL
CHLORIDE SERPL-SCNC: 101 MMOL/L
CK SERPL-CCNC: 696 U/L
CO2 SERPL-SCNC: 25 MMOL/L
COVID-19 SPIKE DOMAIN ANTIBODY INTERPRETATION: POSITIVE
CREAT SERPL-MCNC: 0.45 MG/DL
EOSINOPHIL # BLD AUTO: 0.09 K/UL
EOSINOPHIL NFR BLD AUTO: 1.8 %
FERRITIN SERPL-MCNC: 432 NG/ML
GLUCOSE SERPL-MCNC: 128 MG/DL
HCT VFR BLD CALC: 41.9 %
HGB BLD-MCNC: 13.5 G/DL
IMM GRANULOCYTES NFR BLD AUTO: 0.2 %
LYMPHOCYTES # BLD AUTO: 1.26 K/UL
LYMPHOCYTES NFR BLD AUTO: 25.7 %
MAN DIFF?: NORMAL
MCHC RBC-ENTMCNC: 30.6 PG
MCHC RBC-ENTMCNC: 32.2 GM/DL
MCV RBC AUTO: 95 FL
MONOCYTES # BLD AUTO: 0.15 K/UL
MONOCYTES NFR BLD AUTO: 3.1 %
MYOGLOBIN SERPL-MCNC: 83 NG/ML
NEUTROPHILS # BLD AUTO: 3.36 K/UL
NEUTROPHILS NFR BLD AUTO: 68.4 %
PLATELET # BLD AUTO: 261 K/UL
POTASSIUM SERPL-SCNC: 4.4 MMOL/L
PROT SERPL-MCNC: 8.7 G/DL
RBC # BLD: 4.41 M/UL
RBC # FLD: 13.9 %
SARS-COV-2 AB SERPL IA-ACNC: 60 U/ML
SODIUM SERPL-SCNC: 139 MMOL/L
WBC # FLD AUTO: 4.91 K/UL

## 2021-09-03 ENCOUNTER — TRANSCRIPTION ENCOUNTER (OUTPATIENT)
Age: 61
End: 2021-09-03

## 2021-09-24 ENCOUNTER — TRANSCRIPTION ENCOUNTER (OUTPATIENT)
Age: 61
End: 2021-09-24

## 2021-09-24 LAB
ALBUMIN SERPL ELPH-MCNC: 4.3 G/DL
ALP BLD-CCNC: 108 U/L
ALT SERPL-CCNC: 23 U/L
AST SERPL-CCNC: 34 U/L
BILIRUB DIRECT SERPL-MCNC: 0.1 MG/DL
BILIRUB INDIRECT SERPL-MCNC: 0.4 MG/DL
BILIRUB SERPL-MCNC: 0.6 MG/DL
CK SERPL-CCNC: 915 U/L
MYOGLOBIN SERPL-MCNC: 85 NG/ML
PROT SERPL-MCNC: 9 G/DL

## 2021-10-20 ENCOUNTER — TRANSCRIPTION ENCOUNTER (OUTPATIENT)
Age: 61
End: 2021-10-20

## 2021-10-21 ENCOUNTER — TRANSCRIPTION ENCOUNTER (OUTPATIENT)
Age: 61
End: 2021-10-21

## 2021-10-25 LAB
ALBUMIN SERPL ELPH-MCNC: 4.8 G/DL
ALP BLD-CCNC: 106 U/L
ALT SERPL-CCNC: 23 U/L
ANION GAP SERPL CALC-SCNC: 15 MMOL/L
AST SERPL-CCNC: 37 U/L
BASOPHILS # BLD AUTO: 0.03 K/UL
BASOPHILS NFR BLD AUTO: 0.6 %
BILIRUB SERPL-MCNC: 0.7 MG/DL
BUN SERPL-MCNC: 12 MG/DL
CALCIUM SERPL-MCNC: 10.1 MG/DL
CHLORIDE SERPL-SCNC: 100 MMOL/L
CK SERPL-CCNC: 991 U/L
CO2 SERPL-SCNC: 24 MMOL/L
CREAT SERPL-MCNC: 0.45 MG/DL
EOSINOPHIL # BLD AUTO: 0.04 K/UL
EOSINOPHIL NFR BLD AUTO: 0.8 %
GLUCOSE SERPL-MCNC: 126 MG/DL
HCT VFR BLD CALC: 43.3 %
HGB BLD-MCNC: 13.8 G/DL
IMM GRANULOCYTES NFR BLD AUTO: 0.2 %
LYMPHOCYTES # BLD AUTO: 1.95 K/UL
LYMPHOCYTES NFR BLD AUTO: 40.2 %
MAN DIFF?: NORMAL
MCHC RBC-ENTMCNC: 30.7 PG
MCHC RBC-ENTMCNC: 31.9 GM/DL
MCV RBC AUTO: 96.4 FL
MONOCYTES # BLD AUTO: 0.14 K/UL
MONOCYTES NFR BLD AUTO: 2.9 %
MYOGLOBIN SERPL-MCNC: 91 NG/ML
NEUTROPHILS # BLD AUTO: 2.68 K/UL
NEUTROPHILS NFR BLD AUTO: 55.3 %
PLATELET # BLD AUTO: 231 K/UL
POTASSIUM SERPL-SCNC: 5 MMOL/L
PROT SERPL-MCNC: 9.6 G/DL
RBC # BLD: 4.49 M/UL
RBC # FLD: 15.4 %
SODIUM SERPL-SCNC: 138 MMOL/L
WBC # FLD AUTO: 4.85 K/UL

## 2021-10-26 ENCOUNTER — APPOINTMENT (OUTPATIENT)
Dept: RHEUMATOLOGY | Facility: CLINIC | Age: 61
End: 2021-10-26
Payer: COMMERCIAL

## 2021-10-26 VITALS
WEIGHT: 122 LBS | SYSTOLIC BLOOD PRESSURE: 126 MMHG | TEMPERATURE: 97.1 F | HEART RATE: 76 BPM | RESPIRATION RATE: 16 BRPM | HEIGHT: 60 IN | BODY MASS INDEX: 23.95 KG/M2 | OXYGEN SATURATION: 99 % | DIASTOLIC BLOOD PRESSURE: 78 MMHG

## 2021-10-26 PROCEDURE — 99214 OFFICE O/P EST MOD 30 MIN: CPT

## 2021-10-26 NOTE — PHYSICAL EXAM
[General Appearance - Alert] : alert [General Appearance - In No Acute Distress] : in no acute distress [Sclera] : the sclera and conjunctiva were normal [PERRL With Normal Accommodation] : pupils were equal in size, round, and reactive to light [Extraocular Movements] : extraocular movements were intact [Outer Ear] : the ears and nose were normal in appearance [Oropharynx] : the oropharynx was normal [Neck Appearance] : the appearance of the neck was normal [Neck Cervical Mass (___cm)] : no neck mass was observed [Jugular Venous Distention Increased] : there was no jugular-venous distention [Thyroid Diffuse Enlargement] : the thyroid was not enlarged [Thyroid Nodule] : there were no palpable thyroid nodules [Auscultation Breath Sounds / Voice Sounds] : lungs were clear to auscultation bilaterally [Heart Rate And Rhythm] : heart rate was normal and rhythm regular [Heart Sounds] : normal S1 and S2 [Heart Sounds Gallop] : no gallops [Murmurs] : no murmurs [Heart Sounds Pericardial Friction Rub] : no pericardial rub [Full Pulse] : the pedal pulses are present [Edema] : there was no peripheral edema [Cervical Lymph Nodes Enlarged Posterior Bilaterally] : posterior cervical [Cervical Lymph Nodes Enlarged Anterior Bilaterally] : anterior cervical [Supraclavicular Lymph Nodes Enlarged Bilaterally] : supraclavicular [Axillary Lymph Nodes Enlarged Bilaterally] : axillary [No CVA Tenderness] : no ~M costovertebral angle tenderness [No Spinal Tenderness] : no spinal tenderness [Skin Color & Pigmentation] : normal skin color and pigmentation [] : no rash [No Focal Deficits] : no focal deficits [Oriented To Time, Place, And Person] : oriented to person, place, and time [Impaired Insight] : insight and judgment were intact [Affect] : the affect was normal [FreeTextEntry1] : b/l  UE 5/5 and 5/5, b/l proximal  LE with 4+/5 flexors

## 2021-10-26 NOTE — ASSESSMENT
[FreeTextEntry1] : 61 year old female with recently diagnosed statin induced myositis here for follow up\par \par 1. 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 Elevated HMGCoA Reductase antibody > 200. 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 Started on high dose Prednisone with improving CPK and LFTs - now off Prednisone. On IVIg home infusion bimonthly. Seen by cardio, concerned if bradycardia caused by IVIG but Holter did not report any new events. Doubt that it was related to IVIg but was rather attributed to Humalog possibly and appears that her bradycardia is a bit better since stopping Humalog. Continue to trend muscle enzymes monthly. If increasing or patient symptomatic then will add CellCept\par \par 2. CT chest, abdomen and pelvis w/o ILD or evidence of malignancy. US thyroid also negative for malignancy but has thyroid nodules. WIll f/u with Endocrine.  \par \par 3. HCM: Off Bactrim. Hep and Quant TB done negative in August 2020. Lipid profile and A1C checked today. \par \par 4. Bone health: Ca+D as needed. Baseline DEXA reviewed. Off steroids so will hold off on BPP therapy.  \par \par Follow up in 2 months

## 2021-10-26 NOTE — HISTORY OF PRESENT ILLNESS
[___ Month(s) Ago] : [unfilled] month(s) ago 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 [IVIG] : IVIG [Upper extremity weakness] : upper extremity weakness [Lower extremities weakness] : lower extremities weakness [Currently Experiencing] : currently [Muscle Weakness] : muscle weakness [FreeTextEntry1] : Soreness in the muscles come and go, no dysphagia. Stable but elevated CPK  [Anorexia] : no anorexia [Weight Loss] : no weight loss [Malaise] : no malaise [Fever] : no fever [Chills] : no chills [Fatigue] : no fatigue [Depression] : no depression [Malar Facial Rash] : no malar facial rash [Skin Lesions] : no lesions [Skin Nodules] : no skin nodules [Oral Ulcers] : no oral ulcers [Cough] : no cough [Dry Mouth] : no dry mouth [Dysphonia] : no dysphonia [Dysphagia] : no dysphagia [Shortness of Breath] : no shortness of breath [Chest Pain] : no chest pain [Arthralgias] : no arthralgias [Joint Swelling] : no joint swelling [Joint Warmth] : no joint warmth [Joint Deformity] : no joint deformity [Decreased ROM] : no decreased range of motion [Morning Stiffness] : no morning stiffness [Falls] : no falls [Difficulty Standing] : no difficulty standing [Difficulty Walking] : no difficulty walking [Dyspnea] : no dyspnea [Myalgias] : no myalgias [Muscle Spasms] : no muscle spasms [Muscle Cramping] : no muscle cramping [Visual Changes] : no visual changes [Eye Pain] : no eye pain [Eye Redness] : no eye redness [Dry Eyes] : no dry eyes [TextBox_2] : August 2020 [TextBox_19] : High dose Prednisone [TextBox_24] :  1. Symmetric nonspecific myositis of the bilateral pelvic and hamstring muscles with greatest involvement of the iliopsoas and proximal rectus femoris muscles.  [TextBox_27] : necrotizing myositis [TextBox_43] : Aug 2020 - clear lungs

## 2021-10-28 LAB
CHOLEST SERPL-MCNC: 271 MG/DL
ESTIMATED AVERAGE GLUCOSE: 114 MG/DL
HBA1C MFR BLD HPLC: 5.6 %
HDLC SERPL-MCNC: 70 MG/DL
LDLC SERPL CALC-MCNC: 175 MG/DL
NONHDLC SERPL-MCNC: 201 MG/DL
TRIGL SERPL-MCNC: 128 MG/DL

## 2021-11-02 ENCOUNTER — APPOINTMENT (OUTPATIENT)
Dept: RHEUMATOLOGY | Facility: CLINIC | Age: 61
End: 2021-11-02

## 2021-11-09 ENCOUNTER — TRANSCRIPTION ENCOUNTER (OUTPATIENT)
Age: 61
End: 2021-11-09

## 2021-11-10 ENCOUNTER — TRANSCRIPTION ENCOUNTER (OUTPATIENT)
Age: 61
End: 2021-11-10

## 2021-11-15 ENCOUNTER — NON-APPOINTMENT (OUTPATIENT)
Age: 61
End: 2021-11-15

## 2021-11-15 ENCOUNTER — TRANSCRIPTION ENCOUNTER (OUTPATIENT)
Age: 61
End: 2021-11-15

## 2021-11-29 ENCOUNTER — TRANSCRIPTION ENCOUNTER (OUTPATIENT)
Age: 61
End: 2021-11-29

## 2021-11-29 LAB
ALBUMIN SERPL ELPH-MCNC: 4.7 G/DL
ALP BLD-CCNC: 116 U/L
ALT SERPL-CCNC: 26 U/L
ANION GAP SERPL CALC-SCNC: 13 MMOL/L
APPEARANCE: CLEAR
AST SERPL-CCNC: 35 U/L
BASOPHILS # BLD AUTO: 0.03 K/UL
BASOPHILS NFR BLD AUTO: 0.5 %
BILIRUB SERPL-MCNC: 0.5 MG/DL
BILIRUBIN URINE: NEGATIVE
BLOOD URINE: NEGATIVE
BUN SERPL-MCNC: 11 MG/DL
CALCIUM SERPL-MCNC: 10.1 MG/DL
CHLORIDE SERPL-SCNC: 98 MMOL/L
CK SERPL-CCNC: 913 U/L
CO2 SERPL-SCNC: 26 MMOL/L
COLOR: COLORLESS
CREAT SERPL-MCNC: 0.48 MG/DL
CREAT SPEC-SCNC: 24 MG/DL
CREAT/PROT UR: 0.2 RATIO
EOSINOPHIL # BLD AUTO: 0.06 K/UL
EOSINOPHIL NFR BLD AUTO: 1 %
FERRITIN SERPL-MCNC: 504 NG/ML
GLUCOSE QUALITATIVE U: NEGATIVE
GLUCOSE SERPL-MCNC: 143 MG/DL
HCT VFR BLD CALC: 43 %
HGB BLD-MCNC: 13.4 G/DL
IMM GRANULOCYTES NFR BLD AUTO: 0.3 %
KETONES URINE: NEGATIVE
LEUKOCYTE ESTERASE URINE: NEGATIVE
LYMPHOCYTES # BLD AUTO: 1.63 K/UL
LYMPHOCYTES NFR BLD AUTO: 26.9 %
MAN DIFF?: NORMAL
MCHC RBC-ENTMCNC: 29.5 PG
MCHC RBC-ENTMCNC: 31.2 GM/DL
MCV RBC AUTO: 94.5 FL
MONOCYTES # BLD AUTO: 0.2 K/UL
MONOCYTES NFR BLD AUTO: 3.3 %
MYOGLOBIN SERPL-MCNC: 109 NG/ML
NEUTROPHILS # BLD AUTO: 4.11 K/UL
NEUTROPHILS NFR BLD AUTO: 68 %
NITRITE URINE: NEGATIVE
PH URINE: 7
PLATELET # BLD AUTO: 332 K/UL
POTASSIUM SERPL-SCNC: 4.7 MMOL/L
PROT SERPL-MCNC: 9.3 G/DL
PROT UR-MCNC: 5 MG/DL
PROTEIN URINE: NEGATIVE
RBC # BLD: 4.55 M/UL
RBC # FLD: 14.6 %
SODIUM SERPL-SCNC: 137 MMOL/L
SPECIFIC GRAVITY URINE: 1.01
UROBILINOGEN URINE: NORMAL
WBC # FLD AUTO: 6.05 K/UL

## 2021-12-20 LAB
ALBUMIN SERPL ELPH-MCNC: 4.5 G/DL
ALP BLD-CCNC: 104 U/L
ALT SERPL-CCNC: 31 U/L
ANION GAP SERPL CALC-SCNC: 12 MMOL/L
AST SERPL-CCNC: 47 U/L
BASOPHILS # BLD AUTO: 0.04 K/UL
BASOPHILS NFR BLD AUTO: 1 %
BILIRUB SERPL-MCNC: 0.6 MG/DL
BUN SERPL-MCNC: 10 MG/DL
CALCIUM SERPL-MCNC: 9.7 MG/DL
CHLORIDE SERPL-SCNC: 103 MMOL/L
CK SERPL-CCNC: 1428 U/L
CO2 SERPL-SCNC: 23 MMOL/L
CREAT SERPL-MCNC: 0.44 MG/DL
EOSINOPHIL # BLD AUTO: 0.07 K/UL
EOSINOPHIL NFR BLD AUTO: 1.8 %
GLUCOSE SERPL-MCNC: 148 MG/DL
HCT VFR BLD CALC: 41.4 %
HGB BLD-MCNC: 13.3 G/DL
IMM GRANULOCYTES NFR BLD AUTO: 0.3 %
LYMPHOCYTES # BLD AUTO: 1.02 K/UL
LYMPHOCYTES NFR BLD AUTO: 25.8 %
MAN DIFF?: NORMAL
MCHC RBC-ENTMCNC: 30.5 PG
MCHC RBC-ENTMCNC: 32.1 GM/DL
MCV RBC AUTO: 95 FL
MONOCYTES # BLD AUTO: 0.15 K/UL
MONOCYTES NFR BLD AUTO: 3.8 %
MYOGLOBIN SERPL-MCNC: 154 NG/ML
NEUTROPHILS # BLD AUTO: 2.67 K/UL
NEUTROPHILS NFR BLD AUTO: 67.3 %
PLATELET # BLD AUTO: 295 K/UL
POTASSIUM SERPL-SCNC: 5.3 MMOL/L
PROT SERPL-MCNC: 9.4 G/DL
RBC # BLD: 4.36 M/UL
RBC # FLD: 14.8 %
SODIUM SERPL-SCNC: 139 MMOL/L
WBC # FLD AUTO: 3.96 K/UL

## 2021-12-21 ENCOUNTER — APPOINTMENT (OUTPATIENT)
Dept: RHEUMATOLOGY | Facility: CLINIC | Age: 61
End: 2021-12-21
Payer: COMMERCIAL

## 2021-12-21 VITALS
OXYGEN SATURATION: 99 % | HEIGHT: 60 IN | HEART RATE: 78 BPM | WEIGHT: 121 LBS | BODY MASS INDEX: 23.75 KG/M2 | TEMPERATURE: 97.6 F | RESPIRATION RATE: 16 BRPM | SYSTOLIC BLOOD PRESSURE: 126 MMHG | DIASTOLIC BLOOD PRESSURE: 84 MMHG

## 2021-12-21 LAB
ALBUMIN SERPL ELPH-MCNC: 4.5 G/DL
ALP BLD-CCNC: 110 U/L
ALT SERPL-CCNC: 31 U/L
ANION GAP SERPL CALC-SCNC: 14 MMOL/L
AST SERPL-CCNC: 46 U/L
BILIRUB SERPL-MCNC: 0.4 MG/DL
BUN SERPL-MCNC: 11 MG/DL
CALCIUM SERPL-MCNC: 9.8 MG/DL
CHLORIDE SERPL-SCNC: 101 MMOL/L
CK SERPL-CCNC: 1504 U/L
CO2 SERPL-SCNC: 23 MMOL/L
CREAT SERPL-MCNC: 0.47 MG/DL
GLUCOSE SERPL-MCNC: 161 MG/DL
MYOGLOBIN SERPL-MCNC: 157 NG/ML
POTASSIUM SERPL-SCNC: 4.4 MMOL/L
PROT SERPL-MCNC: 9 G/DL
SODIUM SERPL-SCNC: 138 MMOL/L

## 2021-12-21 PROCEDURE — 99214 OFFICE O/P EST MOD 30 MIN: CPT

## 2021-12-21 NOTE — HISTORY OF PRESENT ILLNESS
[___ Month(s) Ago] : [unfilled] month(s) ago 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 [IVIG] : IVIG [Upper extremity weakness] : upper extremity weakness [Lower extremities weakness] : lower extremities weakness [Currently Experiencing] : currently [Muscle Weakness] : muscle weakness [FreeTextEntry1] : Rising CPK level but patient weakness is stable to improving. Last COVID vaccine on Dec 1st 2021. Did not have a reaction. \par \par Has noticed some sharp stabbing pain in the R side upper and lower extremity joints. Denies any swelling, stiffness or warmth.  [Anorexia] : no anorexia [Weight Loss] : no weight loss [Malaise] : no malaise [Fever] : no fever [Chills] : no chills [Fatigue] : no fatigue [Depression] : no depression [Malar Facial Rash] : no malar facial rash [Skin Lesions] : no lesions [Skin Nodules] : no skin nodules [Oral Ulcers] : no oral ulcers [Cough] : no cough [Dry Mouth] : no dry mouth [Dysphonia] : no dysphonia [Dysphagia] : no dysphagia [Shortness of Breath] : no shortness of breath [Chest Pain] : no chest pain [Arthralgias] : no arthralgias [Joint Swelling] : no joint swelling [Joint Warmth] : no joint warmth [Joint Deformity] : no joint deformity [Decreased ROM] : no decreased range of motion [Morning Stiffness] : no morning stiffness [Falls] : no falls [Difficulty Standing] : no difficulty standing [Difficulty Walking] : no difficulty walking [Dyspnea] : no dyspnea [Myalgias] : no myalgias [Muscle Spasms] : no muscle spasms [Muscle Cramping] : no muscle cramping [Visual Changes] : no visual changes [Eye Pain] : no eye pain [Eye Redness] : no eye redness [Dry Eyes] : no dry eyes [TextBox_2] : August 2020 [TextBox_19] : High dose Prednisone [TextBox_24] :  1. Symmetric nonspecific myositis of the bilateral pelvic and hamstring muscles with greatest involvement of the iliopsoas and proximal rectus femoris muscles.  [TextBox_27] : necrotizing myositis [TextBox_43] : Aug 2020 - clear lungs

## 2021-12-21 NOTE — ASSESSMENT
[FreeTextEntry1] : 61 year old female with recently diagnosed statin induced myositis here for follow up\par \par 1. 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 Elevated HMGCoA Reductase antibody > 200. 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 Started on high dose Prednisone with improving CPK and LFTs - now off Prednisone. On IVIg home infusion bimonthly. Recent increase in CPK unclear whether it is flare of her myositis versus lack of IVIg response versus vaccine related myositis. WIll monitor for now as patient is asymptomatic. Will make changes to therapy if the CPK continues to rise. Repeat labs in 1 month. \par \par 2. Bradycardia: improved. Previously seen by cardio, concerned if bradycardia caused by IVIG but Holter did not report any new events. Doubt that it was related to IVIg but was rather attributed to Humalog possibly and appears that her bradycardia is a bit better since stopping Humalog. \par \par 3. CT chest, abdomen and pelvis w/o ILD or evidence of malignancy. US thyroid also negative for malignancy but has thyroid nodules. WIll f/u with Endocrine.  \par \par 4. HCM: Off Bactrim. Hep and Quant TB done negative in August 2020. Lipid profile and A1C checked with next lab draw. \par \par 5. Bone health: Ca+D as needed. Baseline DEXA reviewed. Off steroids so will hold off on BPP therapy.  \par \par Follow up in 2 months. Repeat labs in 1 month

## 2022-01-04 ENCOUNTER — NON-APPOINTMENT (OUTPATIENT)
Age: 62
End: 2022-01-04

## 2022-01-05 ENCOUNTER — TRANSCRIPTION ENCOUNTER (OUTPATIENT)
Age: 62
End: 2022-01-05

## 2022-01-06 ENCOUNTER — TRANSCRIPTION ENCOUNTER (OUTPATIENT)
Age: 62
End: 2022-01-06

## 2022-01-07 ENCOUNTER — TRANSCRIPTION ENCOUNTER (OUTPATIENT)
Age: 62
End: 2022-01-07

## 2022-01-25 LAB
ALBUMIN SERPL ELPH-MCNC: 4.7 G/DL
ALP BLD-CCNC: 100 U/L
ALT SERPL-CCNC: 34 U/L
ANION GAP SERPL CALC-SCNC: 13 MMOL/L
AST SERPL-CCNC: 48 U/L
BASOPHILS # BLD AUTO: 0.04 K/UL
BASOPHILS NFR BLD AUTO: 0.9 %
BILIRUB SERPL-MCNC: 0.5 MG/DL
BUN SERPL-MCNC: 11 MG/DL
CALCIUM SERPL-MCNC: 9.8 MG/DL
CHLORIDE SERPL-SCNC: 100 MMOL/L
CHOLEST SERPL-MCNC: 256 MG/DL
CK SERPL-CCNC: 1709 U/L
CO2 SERPL-SCNC: 25 MMOL/L
CREAT SERPL-MCNC: 0.53 MG/DL
CRP SERPL-MCNC: <3 MG/L
EOSINOPHIL # BLD AUTO: 0.1 K/UL
EOSINOPHIL NFR BLD AUTO: 2.1 %
ERYTHROCYTE [SEDIMENTATION RATE] IN BLOOD BY WESTERGREN METHOD: 71 MM/HR
ESTIMATED AVERAGE GLUCOSE: 128 MG/DL
GLUCOSE SERPL-MCNC: 149 MG/DL
HBA1C MFR BLD HPLC: 6.1 %
HCT VFR BLD CALC: 40.3 %
HDLC SERPL-MCNC: 62 MG/DL
HGB BLD-MCNC: 12.8 G/DL
IMM GRANULOCYTES NFR BLD AUTO: 0.2 %
LDLC SERPL CALC-MCNC: 162 MG/DL
LYMPHOCYTES # BLD AUTO: 1.47 K/UL
LYMPHOCYTES NFR BLD AUTO: 31.5 %
MAN DIFF?: NORMAL
MCHC RBC-ENTMCNC: 29.9 PG
MCHC RBC-ENTMCNC: 31.8 GM/DL
MCV RBC AUTO: 94.2 FL
MONOCYTES # BLD AUTO: 0.19 K/UL
MONOCYTES NFR BLD AUTO: 4.1 %
MYOGLOBIN SERPL-MCNC: 211 NG/ML
NEUTROPHILS # BLD AUTO: 2.85 K/UL
NEUTROPHILS NFR BLD AUTO: 61.2 %
NONHDLC SERPL-MCNC: 194 MG/DL
PLATELET # BLD AUTO: 285 K/UL
POTASSIUM SERPL-SCNC: 5.3 MMOL/L
PROT SERPL-MCNC: 8.7 G/DL
RBC # BLD: 4.28 M/UL
RBC # FLD: 15.1 %
SODIUM SERPL-SCNC: 139 MMOL/L
TRIGL SERPL-MCNC: 158 MG/DL
WBC # FLD AUTO: 4.66 K/UL

## 2022-02-22 ENCOUNTER — APPOINTMENT (OUTPATIENT)
Dept: RHEUMATOLOGY | Facility: CLINIC | Age: 62
End: 2022-02-22
Payer: COMMERCIAL

## 2022-02-22 VITALS
TEMPERATURE: 97.3 F | WEIGHT: 130 LBS | SYSTOLIC BLOOD PRESSURE: 138 MMHG | DIASTOLIC BLOOD PRESSURE: 83 MMHG | HEIGHT: 60 IN | BODY MASS INDEX: 25.52 KG/M2 | HEART RATE: 77 BPM | OXYGEN SATURATION: 98 %

## 2022-02-22 PROCEDURE — 99214 OFFICE O/P EST MOD 30 MIN: CPT

## 2022-02-22 NOTE — REVIEW OF SYSTEMS
Detail Level: Generalized Instructions: This plan will send the code FBSD to the PM system.  DO NOT or CHANGE the price. Price (Do Not Change): 0.00 [Chills] : no chills [Fever] : no fever [Feeling Poorly] : feeling poorly [Feeling Tired] : feeling tired [Recent Weight Gain (___ Lbs)] : no recent weight gain [Recent Weight Loss (___ Lbs)] : recent [unfilled] ~Ulb weight loss [As Noted in HPI] : as noted in HPI [Confused] : no confusion [Convulsions] : no convulsions [Dizziness] : no dizziness [Fainting] : no fainting [Limb Weakness] : limb weakness [Difficulty Walking] : difficulty walking [Negative] : Psychiatric [FreeTextEntry4] : dysphagia +

## 2022-02-22 NOTE — ASSESSMENT
[FreeTextEntry1] : 61 year old female with recently diagnosed statin induced myositis here for follow up\par \par 1. 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 Elevated HMGCoA Reductase antibody > 200. 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 Started on high dose Prednisone with improving CPK and LFTs - now off Prednisone. On IVIg home infusion bimonthly. Recent increase in CPK unclear whether it is flare of her myositis versus lack of IVIg response versus vaccine related myositis. Completed Prednisone taper for the same and will repeat labs today. If good response, will monitor on tapering steroids. COnsider initiation of CellCept as a steroid sparing therapy. Risks, benefits and side effects of the medication discussed with the patient in detail. Printed medication information for patient to review. \par \par 2. Bradycardia: improved. Previously seen by cardio, concerned if bradycardia caused by IVIG but Holter did not report any new events. Doubt that it was related to IVIg but was rather attributed to Humalog possibly and appears that her bradycardia is a bit better since stopping Humalog. \par \par 3. CT chest, abdomen and pelvis w/o ILD or evidence of malignancy. US thyroid also negative for malignancy but has thyroid nodules. WIll f/u with Endocrine.  \par \par 4. Dyspnea; repeat PFTs \par \par 5. Bone health: Ca+D as needed. Baseline DEXA reviewed. Off steroids so will hold off on BPP therapy.  \par \par Follow up in 6 weeks

## 2022-02-22 NOTE — HISTORY OF PRESENT ILLNESS
[___ Month(s) Ago] : [unfilled] month(s) ago 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 [IVIG] : IVIG [Upper extremity weakness] : upper extremity weakness [Lower extremities weakness] : lower extremities weakness [Currently Experiencing] : currently [Muscle Weakness] : muscle weakness [FreeTextEntry1] : Feels that her muscle soreness has improved, preserved strength. Completed prednisone taper and now down to 10 mg daily.  Off Gammagard since Feb 2022 [Anorexia] : no anorexia [Weight Loss] : no weight loss [Malaise] : no malaise [Fever] : no fever [Chills] : no chills [Fatigue] : no fatigue [Depression] : no depression [Malar Facial Rash] : no malar facial rash [Skin Lesions] : no lesions [Skin Nodules] : no skin nodules [Oral Ulcers] : no oral ulcers [Cough] : no cough [Dry Mouth] : no dry mouth [Dysphonia] : no dysphonia [Dysphagia] : no dysphagia [Shortness of Breath] : no shortness of breath [Chest Pain] : no chest pain [Arthralgias] : no arthralgias [Joint Swelling] : no joint swelling [Joint Warmth] : no joint warmth [Joint Deformity] : no joint deformity [Decreased ROM] : no decreased range of motion [Morning Stiffness] : no morning stiffness [Falls] : no falls [Difficulty Standing] : no difficulty standing [Difficulty Walking] : no difficulty walking [Dyspnea] : no dyspnea [Myalgias] : no myalgias [Muscle Spasms] : no muscle spasms [Muscle Cramping] : no muscle cramping [Visual Changes] : no visual changes [Eye Pain] : no eye pain [Eye Redness] : no eye redness [Dry Eyes] : no dry eyes [TextBox_2] : August 2020 [TextBox_19] : High dose Prednisone [TextBox_24] :  1. Symmetric nonspecific myositis of the bilateral pelvic and hamstring muscles with greatest involvement of the iliopsoas and proximal rectus femoris muscles.  [TextBox_27] : necrotizing myositis [TextBox_43] : Aug 2020 - clear lungs

## 2022-03-03 ENCOUNTER — TRANSCRIPTION ENCOUNTER (OUTPATIENT)
Age: 62
End: 2022-03-03

## 2022-03-03 LAB
ALBUMIN SERPL ELPH-MCNC: 4.9 G/DL
ALP BLD-CCNC: 80 U/L
ALT SERPL-CCNC: 38 U/L
ANION GAP SERPL CALC-SCNC: 15 MMOL/L
AST SERPL-CCNC: 32 U/L
BILIRUB SERPL-MCNC: 0.4 MG/DL
BUN SERPL-MCNC: 9 MG/DL
CALCIUM SERPL-MCNC: 9.7 MG/DL
CHLORIDE SERPL-SCNC: 100 MMOL/L
CK SERPL-CCNC: 874 U/L
CO2 SERPL-SCNC: 24 MMOL/L
CREAT SERPL-MCNC: 0.45 MG/DL
FERRITIN SERPL-MCNC: 269 NG/ML
GLUCOSE SERPL-MCNC: 152 MG/DL
MYOGLOBIN SERPL-MCNC: 175 NG/ML
POTASSIUM SERPL-SCNC: 3.6 MMOL/L
PROT SERPL-MCNC: 7.8 G/DL
SODIUM SERPL-SCNC: 139 MMOL/L

## 2022-03-07 LAB — HMGCR ANTIBODY, IGG: 99 UNITS

## 2022-03-09 ENCOUNTER — TRANSCRIPTION ENCOUNTER (OUTPATIENT)
Age: 62
End: 2022-03-09

## 2022-03-13 LAB
EJ AB SER QL: NEGATIVE
ENA JO1 AB SER IA-ACNC: <20 UNITS
ENA PM/SCL AB SER-ACNC: <20 UNITS
ENA SM+RNP AB SER IA-ACNC: <20 UNITS
ENA SS-A IGG SER QL: <20 UNITS
FIBRILLARIN AB SER QL: NEGATIVE
KU AB SER QL: NEGATIVE
MDA-5 (P140)(CADM-140): <20 UNITS
MI2 AB SER QL: NEGATIVE
NXP-2 (P140): <20 UNITS
OJ AB SER QL: NEGATIVE
PL12 AB SER QL: NEGATIVE
PL7 AB SER QL: NEGATIVE
SRP AB SERPL QL: NEGATIVE
TIF GAMMA (P155/140): <20 UNITS
U2 SNRNP AB SER QL: NEGATIVE

## 2022-03-14 ENCOUNTER — TRANSCRIPTION ENCOUNTER (OUTPATIENT)
Age: 62
End: 2022-03-14

## 2022-03-16 ENCOUNTER — TRANSCRIPTION ENCOUNTER (OUTPATIENT)
Age: 62
End: 2022-03-16

## 2022-04-04 LAB
25(OH)D3 SERPL-MCNC: 15.5 NG/ML
ALBUMIN SERPL ELPH-MCNC: 4.7 G/DL
ALP BLD-CCNC: 93 U/L
ALT SERPL-CCNC: 47 U/L
ANION GAP SERPL CALC-SCNC: 13 MMOL/L
AST SERPL-CCNC: 49 U/L
BASOPHILS # BLD AUTO: 0.03 K/UL
BASOPHILS NFR BLD AUTO: 0.4 %
BILIRUB SERPL-MCNC: 0.6 MG/DL
BUN SERPL-MCNC: 7 MG/DL
CALCIUM SERPL-MCNC: 9.8 MG/DL
CHLORIDE SERPL-SCNC: 98 MMOL/L
CK SERPL-CCNC: 1782 U/L
CO2 SERPL-SCNC: 29 MMOL/L
CREAT SERPL-MCNC: 0.46 MG/DL
EGFR: 109 ML/MIN/1.73M2
EOSINOPHIL # BLD AUTO: 0.05 K/UL
EOSINOPHIL NFR BLD AUTO: 0.6 %
FERRITIN SERPL-MCNC: 439 NG/ML
GLUCOSE SERPL-MCNC: 171 MG/DL
HCT VFR BLD CALC: 42.3 %
HGB BLD-MCNC: 13.6 G/DL
IMM GRANULOCYTES NFR BLD AUTO: 0.2 %
LYMPHOCYTES # BLD AUTO: 1.29 K/UL
LYMPHOCYTES NFR BLD AUTO: 15.2 %
MAN DIFF?: NORMAL
MCHC RBC-ENTMCNC: 30 PG
MCHC RBC-ENTMCNC: 32.2 GM/DL
MCV RBC AUTO: 93.4 FL
MONOCYTES # BLD AUTO: 0.21 K/UL
MONOCYTES NFR BLD AUTO: 2.5 %
MYOGLOBIN SERPL-MCNC: 247 NG/ML
NEUTROPHILS # BLD AUTO: 6.86 K/UL
NEUTROPHILS NFR BLD AUTO: 81.1 %
PLATELET # BLD AUTO: 318 K/UL
POTASSIUM SERPL-SCNC: 4.2 MMOL/L
PROT SERPL-MCNC: 8.2 G/DL
RBC # BLD: 4.53 M/UL
RBC # FLD: 13.9 %
SODIUM SERPL-SCNC: 140 MMOL/L
WBC # FLD AUTO: 8.46 K/UL

## 2022-04-05 ENCOUNTER — APPOINTMENT (OUTPATIENT)
Dept: RHEUMATOLOGY | Facility: CLINIC | Age: 62
End: 2022-04-05
Payer: COMMERCIAL

## 2022-04-05 VITALS
HEIGHT: 63 IN | BODY MASS INDEX: 22.68 KG/M2 | SYSTOLIC BLOOD PRESSURE: 132 MMHG | WEIGHT: 128 LBS | TEMPERATURE: 97.5 F | DIASTOLIC BLOOD PRESSURE: 83 MMHG | HEART RATE: 69 BPM | OXYGEN SATURATION: 98 %

## 2022-04-05 PROCEDURE — 99214 OFFICE O/P EST MOD 30 MIN: CPT | Mod: GC

## 2022-04-06 ENCOUNTER — TRANSCRIPTION ENCOUNTER (OUTPATIENT)
Age: 62
End: 2022-04-06

## 2022-04-11 ENCOUNTER — NON-APPOINTMENT (OUTPATIENT)
Age: 62
End: 2022-04-11

## 2022-04-26 ENCOUNTER — TRANSCRIPTION ENCOUNTER (OUTPATIENT)
Age: 62
End: 2022-04-26

## 2022-05-02 LAB
ALBUMIN SERPL ELPH-MCNC: 4.6 G/DL
ALP BLD-CCNC: 87 U/L
ALT SERPL-CCNC: 54 U/L
ANION GAP SERPL CALC-SCNC: 12 MMOL/L
AST SERPL-CCNC: 68 U/L
BASOPHILS # BLD AUTO: 0.03 K/UL
BASOPHILS NFR BLD AUTO: 0.8 %
BILIRUB SERPL-MCNC: 0.8 MG/DL
BUN SERPL-MCNC: 13 MG/DL
CALCIUM SERPL-MCNC: 9.6 MG/DL
CHLORIDE SERPL-SCNC: 97 MMOL/L
CHOLEST SERPL-MCNC: 274 MG/DL
CK SERPL-CCNC: 3507 U/L
CO2 SERPL-SCNC: 25 MMOL/L
CREAT SERPL-MCNC: 0.44 MG/DL
EGFR: 110 ML/MIN/1.73M2
EOSINOPHIL # BLD AUTO: 0.07 K/UL
EOSINOPHIL NFR BLD AUTO: 1.8 %
ESTIMATED AVERAGE GLUCOSE: 146 MG/DL
GLUCOSE SERPL-MCNC: 163 MG/DL
HBA1C MFR BLD HPLC: 6.7 %
HCT VFR BLD CALC: 38.7 %
HDLC SERPL-MCNC: 65 MG/DL
HGB BLD-MCNC: 12.4 G/DL
IMM GRANULOCYTES NFR BLD AUTO: 0.3 %
LDLC SERPL CALC-MCNC: 176 MG/DL
LYMPHOCYTES # BLD AUTO: 1.23 K/UL
LYMPHOCYTES NFR BLD AUTO: 31.6 %
MAN DIFF?: NORMAL
MCHC RBC-ENTMCNC: 30.3 PG
MCHC RBC-ENTMCNC: 32 GM/DL
MCV RBC AUTO: 94.6 FL
MONOCYTES # BLD AUTO: 0.16 K/UL
MONOCYTES NFR BLD AUTO: 4.1 %
NEUTROPHILS # BLD AUTO: 2.39 K/UL
NEUTROPHILS NFR BLD AUTO: 61.4 %
NONHDLC SERPL-MCNC: 208 MG/DL
PLATELET # BLD AUTO: 348 K/UL
POTASSIUM SERPL-SCNC: 4 MMOL/L
PROT SERPL-MCNC: 8.5 G/DL
RBC # BLD: 4.09 M/UL
RBC # FLD: 15.5 %
SODIUM SERPL-SCNC: 134 MMOL/L
TRIGL SERPL-MCNC: 164 MG/DL
WBC # FLD AUTO: 3.89 K/UL

## 2022-05-05 LAB — MYOGLOBIN SERPL-MCNC: 472 NG/ML

## 2022-05-06 ENCOUNTER — TRANSCRIPTION ENCOUNTER (OUTPATIENT)
Age: 62
End: 2022-05-06

## 2022-05-10 ENCOUNTER — TRANSCRIPTION ENCOUNTER (OUTPATIENT)
Age: 62
End: 2022-05-10

## 2022-05-11 ENCOUNTER — TRANSCRIPTION ENCOUNTER (OUTPATIENT)
Age: 62
End: 2022-05-11

## 2022-05-24 ENCOUNTER — APPOINTMENT (OUTPATIENT)
Dept: RHEUMATOLOGY | Facility: CLINIC | Age: 62
End: 2022-05-24
Payer: COMMERCIAL

## 2022-05-24 VITALS
SYSTOLIC BLOOD PRESSURE: 139 MMHG | HEART RATE: 71 BPM | DIASTOLIC BLOOD PRESSURE: 83 MMHG | BODY MASS INDEX: 22.5 KG/M2 | OXYGEN SATURATION: 98 % | WEIGHT: 127 LBS | HEIGHT: 63 IN | TEMPERATURE: 97.6 F

## 2022-05-24 LAB
ALBUMIN SERPL ELPH-MCNC: 4.4 G/DL
ALP BLD-CCNC: 66 U/L
ALT SERPL-CCNC: 47 U/L
ANION GAP SERPL CALC-SCNC: 10 MMOL/L
AST SERPL-CCNC: 52 U/L
BASOPHILS # BLD AUTO: 0.04 K/UL
BASOPHILS NFR BLD AUTO: 0.9 %
BILIRUB SERPL-MCNC: 0.5 MG/DL
BUN SERPL-MCNC: 11 MG/DL
CALCIUM SERPL-MCNC: 9.2 MG/DL
CHLORIDE SERPL-SCNC: 99 MMOL/L
CK SERPL-CCNC: 1448 U/L
CO2 SERPL-SCNC: 27 MMOL/L
CREAT SERPL-MCNC: 0.5 MG/DL
EGFR: 106 ML/MIN/1.73M2
EOSINOPHIL # BLD AUTO: 0.08 K/UL
EOSINOPHIL NFR BLD AUTO: 1.7 %
GLUCOSE SERPL-MCNC: 151 MG/DL
HCT VFR BLD CALC: 40 %
HGB BLD-MCNC: 12.7 G/DL
IMM GRANULOCYTES NFR BLD AUTO: 0 %
LYMPHOCYTES # BLD AUTO: 1.35 K/UL
LYMPHOCYTES NFR BLD AUTO: 28.7 %
MAN DIFF?: NORMAL
MCHC RBC-ENTMCNC: 31.4 PG
MCHC RBC-ENTMCNC: 31.8 GM/DL
MCV RBC AUTO: 98.8 FL
MONOCYTES # BLD AUTO: 0.27 K/UL
MONOCYTES NFR BLD AUTO: 5.7 %
MYOGLOBIN SERPL-MCNC: 308 NG/ML
NEUTROPHILS # BLD AUTO: 2.96 K/UL
NEUTROPHILS NFR BLD AUTO: 63 %
PLATELET # BLD AUTO: 267 K/UL
POTASSIUM SERPL-SCNC: 4.3 MMOL/L
PROT SERPL-MCNC: 8.5 G/DL
RBC # BLD: 4.05 M/UL
RBC # FLD: 15.1 %
SODIUM SERPL-SCNC: 135 MMOL/L
WBC # FLD AUTO: 4.7 K/UL

## 2022-05-24 PROCEDURE — 99214 OFFICE O/P EST MOD 30 MIN: CPT

## 2022-07-05 LAB
ALBUMIN SERPL ELPH-MCNC: 4.5 G/DL
ALP BLD-CCNC: 57 U/L
ALT SERPL-CCNC: 40 U/L
ANION GAP SERPL CALC-SCNC: 15 MMOL/L
AST SERPL-CCNC: 37 U/L
BASOPHILS # BLD AUTO: 0.03 K/UL
BASOPHILS NFR BLD AUTO: 1 %
BILIRUB SERPL-MCNC: 0.5 MG/DL
BUN SERPL-MCNC: 9 MG/DL
CALCIUM SERPL-MCNC: 9.4 MG/DL
CHLORIDE SERPL-SCNC: 99 MMOL/L
CK SERPL-CCNC: 914 U/L
CO2 SERPL-SCNC: 23 MMOL/L
CREAT SERPL-MCNC: 0.45 MG/DL
CRP SERPL-MCNC: <3 MG/L
EGFR: 109 ML/MIN/1.73M2
EOSINOPHIL # BLD AUTO: 0.04 K/UL
EOSINOPHIL NFR BLD AUTO: 1.4 %
ERYTHROCYTE [SEDIMENTATION RATE] IN BLOOD BY WESTERGREN METHOD: 106 MM/HR
FERRITIN SERPL-MCNC: 297 NG/ML
GLUCOSE SERPL-MCNC: 165 MG/DL
HCT VFR BLD CALC: 42.6 %
HGB BLD-MCNC: 13.8 G/DL
IMM GRANULOCYTES NFR BLD AUTO: 0.3 %
LYMPHOCYTES # BLD AUTO: 0.76 K/UL
LYMPHOCYTES NFR BLD AUTO: 25.7 %
MAN DIFF?: NORMAL
MCHC RBC-ENTMCNC: 31.4 PG
MCHC RBC-ENTMCNC: 32.4 GM/DL
MCV RBC AUTO: 96.8 FL
MONOCYTES # BLD AUTO: 0.18 K/UL
MONOCYTES NFR BLD AUTO: 6.1 %
MYOGLOBIN SERPL-MCNC: 138 NG/ML
NEUTROPHILS # BLD AUTO: 1.94 K/UL
NEUTROPHILS NFR BLD AUTO: 65.5 %
PLATELET # BLD AUTO: 259 K/UL
POTASSIUM SERPL-SCNC: 4.1 MMOL/L
PROT SERPL-MCNC: 10.2 G/DL
RBC # BLD: 4.4 M/UL
RBC # FLD: 14 %
SODIUM SERPL-SCNC: 136 MMOL/L
WBC # FLD AUTO: 2.96 K/UL

## 2022-07-18 ENCOUNTER — APPOINTMENT (OUTPATIENT)
Dept: RHEUMATOLOGY | Facility: CLINIC | Age: 62
End: 2022-07-18

## 2022-07-18 VITALS
HEART RATE: 69 BPM | DIASTOLIC BLOOD PRESSURE: 72 MMHG | TEMPERATURE: 97.4 F | OXYGEN SATURATION: 98 % | HEIGHT: 63 IN | SYSTOLIC BLOOD PRESSURE: 117 MMHG | WEIGHT: 125 LBS | RESPIRATION RATE: 16 BRPM | BODY MASS INDEX: 22.15 KG/M2

## 2022-07-18 PROCEDURE — 99214 OFFICE O/P EST MOD 30 MIN: CPT

## 2022-07-18 RX ORDER — AMOXICILLIN 500 MG/1
500 TABLET, FILM COATED ORAL
Qty: 16 | Refills: 0 | Status: ACTIVE | COMMUNITY
Start: 2022-07-13

## 2022-07-25 ENCOUNTER — TRANSCRIPTION ENCOUNTER (OUTPATIENT)
Age: 62
End: 2022-07-25

## 2022-07-28 LAB
ALBUMIN SERPL ELPH-MCNC: 4.5 G/DL
ALP BLD-CCNC: 74 U/L
ALT SERPL-CCNC: 28 U/L
ANION GAP SERPL CALC-SCNC: 13 MMOL/L
AST SERPL-CCNC: 37 U/L
BASOPHILS # BLD AUTO: 0.03 K/UL
BASOPHILS NFR BLD AUTO: 0.8 %
BILIRUB SERPL-MCNC: 0.7 MG/DL
BUN SERPL-MCNC: 10 MG/DL
CALCIUM SERPL-MCNC: 9.5 MG/DL
CHLORIDE SERPL-SCNC: 101 MMOL/L
CK SERPL-CCNC: 1011 U/L
CO2 SERPL-SCNC: 24 MMOL/L
CREAT SERPL-MCNC: 0.41 MG/DL
CRP SERPL-MCNC: <3 MG/L
DEPRECATED KAPPA LC FREE/LAMBDA SER: 0.96 RATIO
EGFR: 111 ML/MIN/1.73M2
EOSINOPHIL # BLD AUTO: 0.05 K/UL
EOSINOPHIL NFR BLD AUTO: 1.4 %
ERYTHROCYTE [SEDIMENTATION RATE] IN BLOOD BY WESTERGREN METHOD: 76 MM/HR
FERRITIN SERPL-MCNC: 325 NG/ML
GLUCOSE SERPL-MCNC: 159 MG/DL
HAV IGM SER QL: NONREACTIVE
HBV CORE IGG+IGM SER QL: REACTIVE
HBV CORE IGM SER QL: NONREACTIVE
HBV SURFACE AG SER QL: NONREACTIVE
HCT VFR BLD CALC: 43.3 %
HCV AB SER QL: NONREACTIVE
HCV S/CO RATIO: 0.21 S/CO
HGB BLD-MCNC: 14 G/DL
IGA SER QL IEP: 238 MG/DL
IGG SER QL IEP: 2627 MG/DL
IGM SER QL IEP: 48 MG/DL
IMM GRANULOCYTES NFR BLD AUTO: 0.3 %
KAPPA LC CSF-MCNC: 0.99 MG/DL
KAPPA LC SERPL-MCNC: 0.95 MG/DL
LYMPHOCYTES # BLD AUTO: 0.91 K/UL
LYMPHOCYTES NFR BLD AUTO: 24.8 %
M TB IFN-G BLD-IMP: NEGATIVE
MAN DIFF?: NORMAL
MCHC RBC-ENTMCNC: 30.4 PG
MCHC RBC-ENTMCNC: 32.3 GM/DL
MCV RBC AUTO: 93.9 FL
MONOCYTES # BLD AUTO: 0.19 K/UL
MONOCYTES NFR BLD AUTO: 5.2 %
MYOGLOBIN SERPL-MCNC: 118 NG/ML
NEUTROPHILS # BLD AUTO: 2.48 K/UL
NEUTROPHILS NFR BLD AUTO: 67.5 %
PLATELET # BLD AUTO: 236 K/UL
POTASSIUM SERPL-SCNC: 3.9 MMOL/L
PROT SERPL-MCNC: 8.8 G/DL
QUANTIFERON TB PLUS MITOGEN MINUS NIL: 3.38 IU/ML
QUANTIFERON TB PLUS NIL: 0.04 IU/ML
QUANTIFERON TB PLUS TB1 MINUS NIL: 0 IU/ML
QUANTIFERON TB PLUS TB2 MINUS NIL: 0 IU/ML
RBC # BLD: 4.61 M/UL
RBC # FLD: 13.9 %
SODIUM SERPL-SCNC: 138 MMOL/L
WBC # FLD AUTO: 3.67 K/UL

## 2022-07-29 ENCOUNTER — NON-APPOINTMENT (OUTPATIENT)
Age: 62
End: 2022-07-29

## 2022-09-06 LAB
ALBUMIN SERPL ELPH-MCNC: 4.6 G/DL
ALP BLD-CCNC: 89 U/L
ALT SERPL-CCNC: 19 U/L
ANION GAP SERPL CALC-SCNC: 12 MMOL/L
AST SERPL-CCNC: 26 U/L
BASOPHILS # BLD AUTO: 0.04 K/UL
BASOPHILS NFR BLD AUTO: 1.2 %
BILIRUB SERPL-MCNC: 0.6 MG/DL
BUN SERPL-MCNC: 9 MG/DL
CALCIUM SERPL-MCNC: 9.9 MG/DL
CHLORIDE SERPL-SCNC: 103 MMOL/L
CK SERPL-CCNC: 794 U/L
CO2 SERPL-SCNC: 25 MMOL/L
CREAT SERPL-MCNC: 0.42 MG/DL
EGFR: 111 ML/MIN/1.73M2
EOSINOPHIL # BLD AUTO: 0.04 K/UL
EOSINOPHIL NFR BLD AUTO: 1.2 %
FERRITIN SERPL-MCNC: 399 NG/ML
GLUCOSE SERPL-MCNC: 175 MG/DL
HCT VFR BLD CALC: 42.3 %
HGB BLD-MCNC: 13.4 G/DL
IMM GRANULOCYTES NFR BLD AUTO: 0.3 %
LYMPHOCYTES # BLD AUTO: 1 K/UL
LYMPHOCYTES NFR BLD AUTO: 29.9 %
MAN DIFF?: NORMAL
MCHC RBC-ENTMCNC: 30.5 PG
MCHC RBC-ENTMCNC: 31.7 GM/DL
MCV RBC AUTO: 96.1 FL
MONOCYTES # BLD AUTO: 0.12 K/UL
MONOCYTES NFR BLD AUTO: 3.6 %
MYOGLOBIN SERPL-MCNC: 100 NG/ML
NEUTROPHILS # BLD AUTO: 2.14 K/UL
NEUTROPHILS NFR BLD AUTO: 63.8 %
PLATELET # BLD AUTO: 298 K/UL
POTASSIUM SERPL-SCNC: 5.3 MMOL/L
PROT SERPL-MCNC: 8.8 G/DL
RBC # BLD: 4.4 M/UL
RBC # FLD: 14.4 %
SODIUM SERPL-SCNC: 140 MMOL/L
TACROLIMUS SERPL-MCNC: 15.6 NG/ML
WBC # FLD AUTO: 3.35 K/UL

## 2022-09-07 LAB
ALBUMIN SERPL ELPH-MCNC: 4.7 G/DL
ALP BLD-CCNC: 93 U/L
ALT SERPL-CCNC: 16 U/L
ANION GAP SERPL CALC-SCNC: 15 MMOL/L
AST SERPL-CCNC: 26 U/L
BASOPHILS # BLD AUTO: 0.05 K/UL
BASOPHILS NFR BLD AUTO: 1.7 %
BILIRUB SERPL-MCNC: 0.8 MG/DL
BUN SERPL-MCNC: 13 MG/DL
CALCIUM SERPL-MCNC: 10.4 MG/DL
CHLORIDE SERPL-SCNC: 96 MMOL/L
CO2 SERPL-SCNC: 24 MMOL/L
CREAT SERPL-MCNC: 0.53 MG/DL
EGFR: 104 ML/MIN/1.73M2
EOSINOPHIL # BLD AUTO: 0.06 K/UL
EOSINOPHIL NFR BLD AUTO: 2 %
GLUCOSE SERPL-MCNC: 149 MG/DL
HCT VFR BLD CALC: 42.1 %
HGB BLD-MCNC: 14 G/DL
IMM GRANULOCYTES NFR BLD AUTO: 0.3 %
LYMPHOCYTES # BLD AUTO: 1.07 K/UL
LYMPHOCYTES NFR BLD AUTO: 35.7 %
MAN DIFF?: NORMAL
MCHC RBC-ENTMCNC: 30.9 PG
MCHC RBC-ENTMCNC: 33.3 GM/DL
MCV RBC AUTO: 92.9 FL
MONOCYTES # BLD AUTO: 0.18 K/UL
MONOCYTES NFR BLD AUTO: 6 %
NEUTROPHILS # BLD AUTO: 1.63 K/UL
NEUTROPHILS NFR BLD AUTO: 54.3 %
PLATELET # BLD AUTO: 189 K/UL
POTASSIUM SERPL-SCNC: 5.2 MMOL/L
PROT SERPL-MCNC: 9 G/DL
RBC # BLD: 4.53 M/UL
RBC # FLD: 13.7 %
SODIUM SERPL-SCNC: 134 MMOL/L
TACROLIMUS SERPL-MCNC: 40 NG/ML
WBC # FLD AUTO: 3 K/UL

## 2022-09-12 ENCOUNTER — TRANSCRIPTION ENCOUNTER (OUTPATIENT)
Age: 62
End: 2022-09-12

## 2022-09-13 ENCOUNTER — TRANSCRIPTION ENCOUNTER (OUTPATIENT)
Age: 62
End: 2022-09-13

## 2022-09-14 ENCOUNTER — LABORATORY RESULT (OUTPATIENT)
Age: 62
End: 2022-09-14

## 2022-09-16 LAB
ALBUMIN SERPL ELPH-MCNC: 4.7 G/DL
ALP BLD-CCNC: 86 U/L
ALT SERPL-CCNC: 15 U/L
ANION GAP SERPL CALC-SCNC: 11 MMOL/L
AST SERPL-CCNC: 22 U/L
BASOPHILS # BLD AUTO: 0.04 K/UL
BASOPHILS NFR BLD AUTO: 1.4 %
BILIRUB SERPL-MCNC: 0.6 MG/DL
BUN SERPL-MCNC: 11 MG/DL
CALCIUM SERPL-MCNC: 9.8 MG/DL
CHLORIDE SERPL-SCNC: 101 MMOL/L
CHOLEST SERPL-MCNC: 235 MG/DL
CK SERPL-CCNC: 262 U/L
CO2 SERPL-SCNC: 26 MMOL/L
CREAT SERPL-MCNC: 0.47 MG/DL
EGFR: 108 ML/MIN/1.73M2
EOSINOPHIL # BLD AUTO: 0.05 K/UL
EOSINOPHIL NFR BLD AUTO: 1.8 %
ESTIMATED AVERAGE GLUCOSE: 157 MG/DL
GLUCOSE SERPL-MCNC: 166 MG/DL
HBA1C MFR BLD HPLC: 7.1 %
HCT VFR BLD CALC: 40.3 %
HDLC SERPL-MCNC: 71 MG/DL
HGB BLD-MCNC: 13.4 G/DL
IMM GRANULOCYTES NFR BLD AUTO: 0 %
LDLC SERPL CALC-MCNC: 138 MG/DL
LYMPHOCYTES # BLD AUTO: 1.2 K/UL
LYMPHOCYTES NFR BLD AUTO: 43.5 %
MAN DIFF?: NORMAL
MCHC RBC-ENTMCNC: 31 PG
MCHC RBC-ENTMCNC: 33.3 GM/DL
MCV RBC AUTO: 93.3 FL
MONOCYTES # BLD AUTO: 0.15 K/UL
MONOCYTES NFR BLD AUTO: 5.4 %
MYOGLOBIN SERPL-MCNC: 29 NG/ML
NEUTROPHILS # BLD AUTO: 1.32 K/UL
NEUTROPHILS NFR BLD AUTO: 47.9 %
NONHDLC SERPL-MCNC: 163 MG/DL
PLATELET # BLD AUTO: 219 K/UL
POTASSIUM SERPL-SCNC: 4.6 MMOL/L
PROT SERPL-MCNC: 8.9 G/DL
RBC # BLD: 4.32 M/UL
RBC # FLD: 13.2 %
SODIUM SERPL-SCNC: 137 MMOL/L
TACROLIMUS SERPL-MCNC: 7.2 NG/ML
TRIGL SERPL-MCNC: 125 MG/DL
WBC # FLD AUTO: 2.76 K/UL

## 2022-09-27 ENCOUNTER — LABORATORY RESULT (OUTPATIENT)
Age: 62
End: 2022-09-27

## 2022-09-27 ENCOUNTER — APPOINTMENT (OUTPATIENT)
Dept: RHEUMATOLOGY | Facility: CLINIC | Age: 62
End: 2022-09-27

## 2022-09-27 VITALS
HEIGHT: 63 IN | OXYGEN SATURATION: 98 % | BODY MASS INDEX: 21.44 KG/M2 | TEMPERATURE: 97.1 F | HEART RATE: 72 BPM | DIASTOLIC BLOOD PRESSURE: 80 MMHG | RESPIRATION RATE: 16 BRPM | SYSTOLIC BLOOD PRESSURE: 121 MMHG | WEIGHT: 121 LBS

## 2022-09-27 DIAGNOSIS — D70.9 NEUTROPENIA, UNSPECIFIED: ICD-10-CM

## 2022-09-27 PROCEDURE — 99214 OFFICE O/P EST MOD 30 MIN: CPT

## 2022-09-27 RX ORDER — PREDNISONE 5 MG/1
5 TABLET ORAL
Qty: 85 | Refills: 1 | Status: DISCONTINUED | COMMUNITY
Start: 2022-01-25 | End: 2022-09-27

## 2022-09-27 RX ORDER — MYCOPHENOLATE MOFETIL 500 MG/1
500 TABLET ORAL
Qty: 180 | Refills: 0 | Status: DISCONTINUED | COMMUNITY
Start: 2022-04-06 | End: 2022-09-27

## 2022-09-27 NOTE — REVIEW OF SYSTEMS
[As Noted in HPI] : as noted in HPI [Limb Weakness] : limb weakness [Anxiety] : anxiety [Muscle Weakness] : muscle weakness [Negative] : Genitourinary [Fever] : no fever [Chills] : no chills [Recent Weight Gain (___ Lbs)] : no recent weight gain [Eye Pain] : no eye pain [Chest Pain] : no chest pain [Shortness Of Breath] : no shortness of breath [Cough] : no cough [Abdominal Pain] : no abdominal pain [Vomiting] : no vomiting [Diarrhea] : no diarrhea [Joint Pain] : no joint pain [Itching] : no itching [Confused] : no confusion [Convulsions] : no convulsions [Dizziness] : no dizziness [Fainting] : no fainting [FreeTextEntry4] : dysphagia +

## 2022-09-27 NOTE — PHYSICAL EXAM
[General Appearance - Alert] : alert [General Appearance - In No Acute Distress] : in no acute distress [Sclera] : the sclera and conjunctiva were normal [PERRL With Normal Accommodation] : pupils were equal in size, round, and reactive to light [Extraocular Movements] : extraocular movements were intact [Outer Ear] : the ears and nose were normal in appearance [Oropharynx] : the oropharynx was normal [Neck Appearance] : the appearance of the neck was normal [Neck Cervical Mass (___cm)] : no neck mass was observed [Jugular Venous Distention Increased] : there was no jugular-venous distention [Thyroid Diffuse Enlargement] : the thyroid was not enlarged [Thyroid Nodule] : there were no palpable thyroid nodules [Auscultation Breath Sounds / Voice Sounds] : lungs were clear to auscultation bilaterally [Heart Rate And Rhythm] : heart rate was normal and rhythm regular [Heart Sounds] : normal S1 and S2 [Heart Sounds Gallop] : no gallops [Murmurs] : no murmurs [Heart Sounds Pericardial Friction Rub] : no pericardial rub [Full Pulse] : the pedal pulses are present [Edema] : there was no peripheral edema [Cervical Lymph Nodes Enlarged Posterior Bilaterally] : posterior cervical [Cervical Lymph Nodes Enlarged Anterior Bilaterally] : anterior cervical [Supraclavicular Lymph Nodes Enlarged Bilaterally] : supraclavicular [Axillary Lymph Nodes Enlarged Bilaterally] : axillary [No CVA Tenderness] : no ~M costovertebral angle tenderness [No Spinal Tenderness] : no spinal tenderness [Skin Color & Pigmentation] : normal skin color and pigmentation [] : no rash [No Focal Deficits] : no focal deficits [Oriented To Time, Place, And Person] : oriented to person, place, and time [Impaired Insight] : insight and judgment were intact [Affect] : the affect was normal [FreeTextEntry1] : No signs of synovitis, limitation of ROM or deformity in any joint. B/l UE 5/5 proximally and distally, b/l proximal LE with 4+/5 proximally and 5/5 distally

## 2022-09-27 NOTE — HISTORY OF PRESENT ILLNESS
[___ Month(s) Ago] : [unfilled] month(s) ago 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 [IVIG] : IVIG [Upper extremity weakness] : upper extremity weakness [Lower extremities weakness] : lower extremities weakness [Currently Experiencing] : currently [Muscle Weakness] : muscle weakness [FreeTextEntry1] : Labs in between visits with high Tacro level - now on 3 pills daily and recent labs suggested satisfactory level. CPK has  improved. She is concerned that her bradycardia is due to her new medications. Also, noted to have persistent neutropenia now worsening on Tacrolimus \par \par RLE weakness +  [Anorexia] : no anorexia [Weight Loss] : no weight loss [Malaise] : no malaise [Fever] : no fever [Chills] : no chills [Fatigue] : no fatigue [Depression] : no depression [Malar Facial Rash] : no malar facial rash [Skin Lesions] : no lesions [Skin Nodules] : no skin nodules [Oral Ulcers] : no oral ulcers [Cough] : no cough [Dry Mouth] : no dry mouth [Dysphonia] : no dysphonia [Dysphagia] : no dysphagia [Shortness of Breath] : no shortness of breath [Chest Pain] : no chest pain [Arthralgias] : no arthralgias [Joint Swelling] : no joint swelling [Joint Warmth] : no joint warmth [Joint Deformity] : no joint deformity [Decreased ROM] : no decreased range of motion [Morning Stiffness] : no morning stiffness [Falls] : no falls [Difficulty Standing] : no difficulty standing [Difficulty Walking] : no difficulty walking [Dyspnea] : no dyspnea [Myalgias] : no myalgias [Muscle Spasms] : no muscle spasms [Muscle Cramping] : no muscle cramping [Visual Changes] : no visual changes [Eye Pain] : no eye pain [Eye Redness] : no eye redness [Dry Eyes] : no dry eyes [TextBox_2] : August 2020 [TextBox_19] : High dose Prednisone [TextBox_24] :  1. Symmetric nonspecific myositis of the bilateral pelvic and hamstring muscles with greatest involvement of the iliopsoas and proximal rectus femoris muscles.  [TextBox_27] : necrotizing myositis [TextBox_43] : Aug 2020 - clear lungs

## 2022-10-03 LAB
25(OH)D3 SERPL-MCNC: 20.1 NG/ML
BASOPHILS # BLD AUTO: 0.04 K/UL
BASOPHILS NFR BLD AUTO: 1.4 %
CK SERPL-CCNC: 200 U/L
EOSINOPHIL # BLD AUTO: 0.03 K/UL
EOSINOPHIL NFR BLD AUTO: 1 %
HCT VFR BLD CALC: 39.2 %
HGB BLD-MCNC: 13.6 G/DL
IMM GRANULOCYTES NFR BLD AUTO: 0 %
LYMPHOCYTES # BLD AUTO: 1.25 K/UL
LYMPHOCYTES NFR BLD AUTO: 43.1 %
MAN DIFF?: NORMAL
MCHC RBC-ENTMCNC: 31 PG
MCHC RBC-ENTMCNC: 34.7 GM/DL
MCV RBC AUTO: 89.3 FL
MONOCYTES # BLD AUTO: 0.14 K/UL
MONOCYTES NFR BLD AUTO: 4.8 %
MYOGLOBIN SERPL-MCNC: 30 NG/ML
NEUTROPHILS # BLD AUTO: 1.44 K/UL
NEUTROPHILS NFR BLD AUTO: 49.7 %
PLATELET # BLD AUTO: 241 K/UL
RBC # BLD: 4.39 M/UL
RBC # FLD: 13.3 %
TACROLIMUS SERPL-MCNC: 26.1 NG/ML
WBC # FLD AUTO: 2.9 K/UL

## 2022-10-20 LAB
ALBUMIN SERPL ELPH-MCNC: 4.3 G/DL
ALP BLD-CCNC: 86 U/L
ALT SERPL-CCNC: 15 U/L
ANION GAP SERPL CALC-SCNC: 10 MMOL/L
AST SERPL-CCNC: 18 U/L
BASOPHILS # BLD AUTO: 0.02 K/UL
BASOPHILS NFR BLD AUTO: 0.6 %
BILIRUB SERPL-MCNC: 0.7 MG/DL
BUN SERPL-MCNC: 9 MG/DL
CALCIUM SERPL-MCNC: 9.7 MG/DL
CHLORIDE SERPL-SCNC: 99 MMOL/L
CK SERPL-CCNC: 125 U/L
CO2 SERPL-SCNC: 26 MMOL/L
CREAT SERPL-MCNC: 0.42 MG/DL
EGFR: 111 ML/MIN/1.73M2
EOSINOPHIL # BLD AUTO: 0.03 K/UL
EOSINOPHIL NFR BLD AUTO: 0.9 %
GLUCOSE SERPL-MCNC: 179 MG/DL
HCT VFR BLD CALC: 40.7 %
HGB BLD-MCNC: 13.6 G/DL
IMM GRANULOCYTES NFR BLD AUTO: 0 %
LYMPHOCYTES # BLD AUTO: 1.03 K/UL
LYMPHOCYTES NFR BLD AUTO: 32.3 %
MAN DIFF?: NORMAL
MCHC RBC-ENTMCNC: 30.8 PG
MCHC RBC-ENTMCNC: 33.4 GM/DL
MCV RBC AUTO: 92.1 FL
MONOCYTES # BLD AUTO: 0.18 K/UL
MONOCYTES NFR BLD AUTO: 5.6 %
MYOGLOBIN SERPL-MCNC: <21 NG/ML
NEUTROPHILS # BLD AUTO: 1.93 K/UL
NEUTROPHILS NFR BLD AUTO: 60.6 %
PLATELET # BLD AUTO: 223 K/UL
POTASSIUM SERPL-SCNC: 4.7 MMOL/L
PROT SERPL-MCNC: 10.2 G/DL
RBC # BLD: 4.42 M/UL
RBC # FLD: 13.5 %
SODIUM SERPL-SCNC: 135 MMOL/L
WBC # FLD AUTO: 3.19 K/UL

## 2022-10-26 ENCOUNTER — TRANSCRIPTION ENCOUNTER (OUTPATIENT)
Age: 62
End: 2022-10-26

## 2022-10-28 ENCOUNTER — TRANSCRIPTION ENCOUNTER (OUTPATIENT)
Age: 62
End: 2022-10-28

## 2022-10-28 ENCOUNTER — NON-APPOINTMENT (OUTPATIENT)
Age: 62
End: 2022-10-28

## 2022-10-30 ENCOUNTER — RX RENEWAL (OUTPATIENT)
Age: 62
End: 2022-10-30

## 2022-11-21 LAB
ALBUMIN SERPL ELPH-MCNC: 4.8 G/DL
ALP BLD-CCNC: 94 U/L
ALT SERPL-CCNC: 18 U/L
ANION GAP SERPL CALC-SCNC: 11 MMOL/L
AST SERPL-CCNC: 23 U/L
BASOPHILS # BLD AUTO: 0.05 K/UL
BASOPHILS NFR BLD AUTO: 1.5 %
BILIRUB SERPL-MCNC: 0.9 MG/DL
BUN SERPL-MCNC: 11 MG/DL
CALCIUM SERPL-MCNC: 9.9 MG/DL
CHLORIDE SERPL-SCNC: 97 MMOL/L
CK SERPL-CCNC: 82 U/L
CO2 SERPL-SCNC: 25 MMOL/L
CREAT SERPL-MCNC: 0.42 MG/DL
EGFR: 111 ML/MIN/1.73M2
EOSINOPHIL # BLD AUTO: 0.03 K/UL
EOSINOPHIL NFR BLD AUTO: 0.9 %
ERYTHROCYTE [SEDIMENTATION RATE] IN BLOOD BY WESTERGREN METHOD: 108 MM/HR
GGT SERPL-CCNC: 28 U/L
GLUCOSE SERPL-MCNC: 209 MG/DL
HCT VFR BLD CALC: 40.2 %
HGB BLD-MCNC: 14.1 G/DL
IMM GRANULOCYTES NFR BLD AUTO: 0.3 %
LYMPHOCYTES # BLD AUTO: 1.07 K/UL
LYMPHOCYTES NFR BLD AUTO: 32.2 %
MAN DIFF?: NORMAL
MCHC RBC-ENTMCNC: 31.6 PG
MCHC RBC-ENTMCNC: 35.1 GM/DL
MCV RBC AUTO: 90.1 FL
MONOCYTES # BLD AUTO: 0.19 K/UL
MONOCYTES NFR BLD AUTO: 5.7 %
MYOGLOBIN SERPL-MCNC: <21 NG/ML
NEUTROPHILS # BLD AUTO: 1.97 K/UL
NEUTROPHILS NFR BLD AUTO: 59.4 %
PLATELET # BLD AUTO: 293 K/UL
POTASSIUM SERPL-SCNC: 4.6 MMOL/L
PROT SERPL-MCNC: 9.7 G/DL
RBC # BLD: 4.46 M/UL
RBC # FLD: 14 %
SODIUM SERPL-SCNC: 133 MMOL/L
TACROLIMUS SERPL-MCNC: 2 NG/ML
WBC # FLD AUTO: 3.32 K/UL

## 2022-11-23 ENCOUNTER — APPOINTMENT (OUTPATIENT)
Dept: RHEUMATOLOGY | Facility: CLINIC | Age: 62
End: 2022-11-23

## 2022-11-23 PROCEDURE — 99447 NTRPROF PH1/NTRNET/EHR 11-20: CPT

## 2022-12-05 LAB
ALBUMIN SERPL ELPH-MCNC: 4.7 G/DL
ALP BLD-CCNC: 99 U/L
ALT SERPL-CCNC: 14 U/L
ANION GAP SERPL CALC-SCNC: 13 MMOL/L
AST SERPL-CCNC: 24 U/L
BASOPHILS # BLD AUTO: 0.11 K/UL
BASOPHILS NFR BLD AUTO: 1.9 %
BILIRUB SERPL-MCNC: 0.9 MG/DL
BUN SERPL-MCNC: 9 MG/DL
CALCIUM SERPL-MCNC: 9.5 MG/DL
CHLORIDE SERPL-SCNC: 97 MMOL/L
CK SERPL-CCNC: 96 U/L
CO2 SERPL-SCNC: 24 MMOL/L
CREAT SERPL-MCNC: 0.42 MG/DL
CRP SERPL-MCNC: 14 MG/L
EGFR: 111 ML/MIN/1.73M2
EOSINOPHIL # BLD AUTO: 0.03 K/UL
EOSINOPHIL NFR BLD AUTO: 0.5 %
ERYTHROCYTE [SEDIMENTATION RATE] IN BLOOD BY WESTERGREN METHOD: 104 MM/HR
GLUCOSE SERPL-MCNC: 186 MG/DL
HCT VFR BLD CALC: 40.5 %
HGB BLD-MCNC: 13.2 G/DL
IMM GRANULOCYTES NFR BLD AUTO: 0.2 %
LYMPHOCYTES # BLD AUTO: 0.89 K/UL
LYMPHOCYTES NFR BLD AUTO: 15.5 %
MAN DIFF?: NORMAL
MCHC RBC-ENTMCNC: 30.9 PG
MCHC RBC-ENTMCNC: 32.6 GM/DL
MCV RBC AUTO: 94.8 FL
MONOCYTES # BLD AUTO: 0.3 K/UL
MONOCYTES NFR BLD AUTO: 5.2 %
MYOGLOBIN SERPL-MCNC: <21 NG/ML
NEUTROPHILS # BLD AUTO: 4.42 K/UL
NEUTROPHILS NFR BLD AUTO: 76.7 %
PLATELET # BLD AUTO: NORMAL K/UL
POTASSIUM SERPL-SCNC: 4.5 MMOL/L
PROT SERPL-MCNC: 9.4 G/DL
RBC # BLD: 4.27 M/UL
RBC # FLD: 14.5 %
SODIUM SERPL-SCNC: 134 MMOL/L
WBC # FLD AUTO: 5.76 K/UL

## 2022-12-06 ENCOUNTER — APPOINTMENT (OUTPATIENT)
Dept: RHEUMATOLOGY | Facility: CLINIC | Age: 62
End: 2022-12-06

## 2022-12-06 PROCEDURE — 99447 NTRPROF PH1/NTRNET/EHR 11-20: CPT

## 2022-12-06 RX ORDER — ACETAMINOPHEN 325 MG/1
325 TABLET ORAL
Qty: 30 | Refills: 0 | Status: ACTIVE | COMMUNITY
Start: 2022-12-03

## 2023-01-10 ENCOUNTER — TRANSCRIPTION ENCOUNTER (OUTPATIENT)
Age: 63
End: 2023-01-10

## 2023-01-11 ENCOUNTER — TRANSCRIPTION ENCOUNTER (OUTPATIENT)
Age: 63
End: 2023-01-11

## 2023-01-17 ENCOUNTER — APPOINTMENT (OUTPATIENT)
Dept: RHEUMATOLOGY | Facility: CLINIC | Age: 63
End: 2023-01-17
Payer: COMMERCIAL

## 2023-01-17 VITALS
SYSTOLIC BLOOD PRESSURE: 113 MMHG | OXYGEN SATURATION: 98 % | HEIGHT: 63 IN | HEART RATE: 65 BPM | WEIGHT: 126 LBS | DIASTOLIC BLOOD PRESSURE: 75 MMHG | BODY MASS INDEX: 22.32 KG/M2

## 2023-01-17 LAB
ALBUMIN SERPL ELPH-MCNC: 4.3 G/DL
ALP BLD-CCNC: 108 U/L
ALT SERPL-CCNC: 62 U/L
ANION GAP SERPL CALC-SCNC: 11 MMOL/L
AST SERPL-CCNC: 43 U/L
BASOPHILS # BLD AUTO: 0.05 K/UL
BASOPHILS NFR BLD AUTO: 1 %
BILIRUB SERPL-MCNC: 0.6 MG/DL
BUN SERPL-MCNC: 11 MG/DL
CALCIUM SERPL-MCNC: 9.4 MG/DL
CHLORIDE SERPL-SCNC: 99 MMOL/L
CHOLEST SERPL-MCNC: 265 MG/DL
CK SERPL-CCNC: 214 U/L
CO2 SERPL-SCNC: 25 MMOL/L
CREAT SERPL-MCNC: 0.43 MG/DL
CRP SERPL-MCNC: 8 MG/L
EGFR: 110 ML/MIN/1.73M2
EOSINOPHIL # BLD AUTO: 0.09 K/UL
EOSINOPHIL NFR BLD AUTO: 1.7 %
ERYTHROCYTE [SEDIMENTATION RATE] IN BLOOD BY WESTERGREN METHOD: 107 MM/HR
FERRITIN SERPL-MCNC: 487 NG/ML
GLUCOSE SERPL-MCNC: 168 MG/DL
HCT VFR BLD CALC: 37.4 %
HDLC SERPL-MCNC: 67 MG/DL
HGB BLD-MCNC: 12.8 G/DL
IMM GRANULOCYTES NFR BLD AUTO: 0.2 %
LDLC SERPL CALC-MCNC: 171 MG/DL
LYMPHOCYTES # BLD AUTO: 1.02 K/UL
LYMPHOCYTES NFR BLD AUTO: 19.7 %
MAN DIFF?: NORMAL
MCHC RBC-ENTMCNC: 31.3 PG
MCHC RBC-ENTMCNC: 34.2 GM/DL
MCV RBC AUTO: 91.4 FL
MONOCYTES # BLD AUTO: 0.22 K/UL
MONOCYTES NFR BLD AUTO: 4.3 %
MYOGLOBIN SERPL-MCNC: 41 NG/ML
NEUTROPHILS # BLD AUTO: 3.78 K/UL
NEUTROPHILS NFR BLD AUTO: 73.1 %
NONHDLC SERPL-MCNC: 199 MG/DL
PLATELET # BLD AUTO: 217 K/UL
POTASSIUM SERPL-SCNC: 4.5 MMOL/L
PROT SERPL-MCNC: 9 G/DL
RBC # BLD: 4.09 M/UL
RBC # FLD: 13.6 %
SODIUM SERPL-SCNC: 135 MMOL/L
TRIGL SERPL-MCNC: 139 MG/DL
WBC # FLD AUTO: 5.17 K/UL

## 2023-01-17 PROCEDURE — 99214 OFFICE O/P EST MOD 30 MIN: CPT

## 2023-01-17 NOTE — HISTORY OF PRESENT ILLNESS
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 On IVIg monotherapy. Had COVID in Dec 2022 and got Paxlovid. Symptoms were mild and now resolved. One week ago noticed some difficulty swallowing, now improved. No new weakness. \par Labs from last week showed a mild increase in CPK.  [Anorexia] : no anorexia [Weight Loss] : no weight loss [Malaise] : no malaise [Fever] : no fever [Chills] : no chills [Fatigue] : no fatigue [Depression] : no depression [Malar Facial Rash] : no malar facial rash [Skin Lesions] : no lesions [Skin Nodules] : no skin nodules [Oral Ulcers] : no oral ulcers [Cough] : no cough [Dry Mouth] : no dry mouth [Dysphonia] : no dysphonia [Dysphagia] : no dysphagia [Shortness of Breath] : no shortness of breath [Chest Pain] : no chest pain [Arthralgias] : no arthralgias [Joint Swelling] : no joint swelling [Joint Warmth] : no joint warmth [Joint Deformity] : no joint deformity [Decreased ROM] : no decreased range of motion [Morning Stiffness] : no morning stiffness [Falls] : no falls [Difficulty Standing] : no difficulty standing [Difficulty Walking] : no difficulty walking [Dyspnea] : no dyspnea [Myalgias] : no myalgias [Muscle Spasms] : no muscle spasms [Muscle Cramping] : no muscle cramping [Visual Changes] : no visual changes [Eye Pain] : no eye pain [Eye Redness] : no eye redness [Dry Eyes] : no dry eyes [TextBox_2] : August 2020 [TextBox_19] : High dose Prednisone [TextBox_24] :  1. Symmetric nonspecific myositis of the bilateral pelvic and hamstring muscles with greatest involvement of the iliopsoas and proximal rectus femoris muscles.  [TextBox_27] : necrotizing myositis [TextBox_43] : Aug 2020 - clear lungs

## 2023-01-17 NOTE — ASSESSMENT
[FreeTextEntry1] : 62 year old female with recently diagnosed statin induced myositis here for follow up\par \par 1. 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 Elevated HMGCoA Reductase antibody > 200. 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 Started on high dose Prednisone with improving CPK and LFTs, came off steroids and was on IVIg home infusion bimonthly. Had an increase in CPK concerning for failure of monotherapy with IVIG. Was started on steroid taper with good response but with tapering CK started to get elevated again. Decision made to start MMF in Apr 2022 - while there has been good response with decrease in CPK, also noted to have leukopenia. She was thus transitioned to Tacrolimus but discontinued it shortly after. She was briefly taking chinese herbal medication under the guidance of a chinese rheumatologist. Now takes it on and off due to GI discomfort with this medication. Continuing IVIg infusions monthly. CPK has improved but is borderline abnormal. Leukopenia stable but persistent. Continue to monitor labs for now. Will not change treatment.  \par \par 2. Leukopenia:? drug induced. Continue to monitor and refer for Heme evaluation\par \par 3. CT chest, abdomen and pelvis w/o ILD or evidence of malignancy. US thyroid also negative for malignancy but has thyroid nodules. WIll f/u with Endocrine.  Also advised to f/u with PCP and Gyn for malignancy screening as her mammogram, colonoscopy and PAP are all due since the pandemic. \par \par 4. Bone health: Ca+D as needed. Baseline DEXA reviewed. Off steroids so will hold off on BPP therapy.  \par \par Follow up in 12 weeks\par \par

## 2023-02-07 RX ORDER — IMMUNE GLOBULIN INFUSION (HUMAN) 100 MG/ML
30 INJECTION, SOLUTION INTRAVENOUS; SUBCUTANEOUS
Qty: 2 | Refills: 5 | Status: ACTIVE | OUTPATIENT
Start: 2020-08-26

## 2023-02-13 LAB
ALBUMIN SERPL ELPH-MCNC: 4.5 G/DL
ALP BLD-CCNC: 103 U/L
ALT SERPL-CCNC: 133 U/L
ANION GAP SERPL CALC-SCNC: 14 MMOL/L
APPEARANCE: CLEAR
AST SERPL-CCNC: 108 U/L
BASOPHILS # BLD AUTO: 0.04 K/UL
BASOPHILS NFR BLD AUTO: 0.9 %
BILIRUB SERPL-MCNC: 0.6 MG/DL
BILIRUBIN URINE: NEGATIVE
BLOOD URINE: NEGATIVE
BUN SERPL-MCNC: 12 MG/DL
CALCIUM SERPL-MCNC: 9.6 MG/DL
CHLORIDE SERPL-SCNC: 99 MMOL/L
CK SERPL-CCNC: 575 U/L
CO2 SERPL-SCNC: 23 MMOL/L
COLOR: NORMAL
CREAT SERPL-MCNC: 0.46 MG/DL
CREAT SPEC-SCNC: 35 MG/DL
CREAT/PROT UR: 0.2 RATIO
CRP SERPL-MCNC: 4 MG/L
DEPRECATED KAPPA LC FREE/LAMBDA SER: 1.18 RATIO
EGFR: 108 ML/MIN/1.73M2
EOSINOPHIL # BLD AUTO: 0.06 K/UL
EOSINOPHIL NFR BLD AUTO: 1.3 %
ERYTHROCYTE [SEDIMENTATION RATE] IN BLOOD BY WESTERGREN METHOD: 65 MM/HR
GLUCOSE QUALITATIVE U: NEGATIVE
GLUCOSE SERPL-MCNC: 174 MG/DL
HCT VFR BLD CALC: 39.6 %
HGB BLD-MCNC: 13.1 G/DL
IGA SER QL IEP: 266 MG/DL
IGG SER QL IEP: 3359 MG/DL
IGM SER QL IEP: 65 MG/DL
IMM GRANULOCYTES NFR BLD AUTO: 0.2 %
KAPPA LC CSF-MCNC: 1.09 MG/DL
KAPPA LC SERPL-MCNC: 1.29 MG/DL
KETONES URINE: NEGATIVE
LEUKOCYTE ESTERASE URINE: NEGATIVE
LYMPHOCYTES # BLD AUTO: 0.96 K/UL
LYMPHOCYTES NFR BLD AUTO: 20.7 %
MAN DIFF?: NORMAL
MCHC RBC-ENTMCNC: 31 PG
MCHC RBC-ENTMCNC: 33.1 GM/DL
MCV RBC AUTO: 93.8 FL
MONOCYTES # BLD AUTO: 0.24 K/UL
MONOCYTES NFR BLD AUTO: 5.2 %
MYOGLOBIN SERPL-MCNC: 110 NG/ML
NEUTROPHILS # BLD AUTO: 3.32 K/UL
NEUTROPHILS NFR BLD AUTO: 71.7 %
NITRITE URINE: NEGATIVE
PH URINE: 6.5
PLATELET # BLD AUTO: 244 K/UL
POTASSIUM SERPL-SCNC: 4.6 MMOL/L
PROT SERPL-MCNC: 9.2 G/DL
PROT UR-MCNC: 8 MG/DL
PROTEIN URINE: NEGATIVE
RBC # BLD: 4.22 M/UL
RBC # FLD: 13.7 %
SODIUM SERPL-SCNC: 136 MMOL/L
SPECIFIC GRAVITY URINE: 1.01
UROBILINOGEN URINE: NORMAL
WBC # FLD AUTO: 4.63 K/UL

## 2023-03-17 ENCOUNTER — TRANSCRIPTION ENCOUNTER (OUTPATIENT)
Age: 63
End: 2023-03-17

## 2023-03-24 ENCOUNTER — TRANSCRIPTION ENCOUNTER (OUTPATIENT)
Age: 63
End: 2023-03-24

## 2023-03-24 LAB
ALBUMIN SERPL ELPH-MCNC: 4 G/DL
ALP BLD-CCNC: 97 U/L
ALT SERPL-CCNC: 51 U/L
ANION GAP SERPL CALC-SCNC: 16 MMOL/L
AST SERPL-CCNC: 67 U/L
BASOPHILS # BLD AUTO: 0.03 K/UL
BASOPHILS NFR BLD AUTO: 0.5 %
BILIRUB SERPL-MCNC: 0.4 MG/DL
BUN SERPL-MCNC: 10 MG/DL
CALCIUM SERPL-MCNC: 9.4 MG/DL
CHLORIDE SERPL-SCNC: 99 MMOL/L
CK SERPL-CCNC: 3212 U/L
CO2 SERPL-SCNC: 22 MMOL/L
CREAT SERPL-MCNC: 0.45 MG/DL
EGFR: 109 ML/MIN/1.73M2
EOSINOPHIL # BLD AUTO: 0.08 K/UL
EOSINOPHIL NFR BLD AUTO: 1.3 %
GLUCOSE SERPL-MCNC: 192 MG/DL
HCT VFR BLD CALC: 36.3 %
HGB BLD-MCNC: 11.8 G/DL
IMM GRANULOCYTES NFR BLD AUTO: 0.2 %
LYMPHOCYTES # BLD AUTO: 0.86 K/UL
LYMPHOCYTES NFR BLD AUTO: 13.6 %
MAN DIFF?: NORMAL
MCHC RBC-ENTMCNC: 30 PG
MCHC RBC-ENTMCNC: 32.5 GM/DL
MCV RBC AUTO: 92.4 FL
MONOCYTES # BLD AUTO: 0.21 K/UL
MONOCYTES NFR BLD AUTO: 3.3 %
MYOGLOBIN SERPL-MCNC: 365 NG/ML
NEUTROPHILS # BLD AUTO: 5.12 K/UL
NEUTROPHILS NFR BLD AUTO: 81.1 %
PLATELET # BLD AUTO: 307 K/UL
POTASSIUM SERPL-SCNC: 4.5 MMOL/L
PROT SERPL-MCNC: 7.9 G/DL
RBC # BLD: 3.93 M/UL
RBC # FLD: 13.3 %
SODIUM SERPL-SCNC: 137 MMOL/L
WBC # FLD AUTO: 6.31 K/UL

## 2023-03-27 ENCOUNTER — TRANSCRIPTION ENCOUNTER (OUTPATIENT)
Age: 63
End: 2023-03-27

## 2023-04-13 ENCOUNTER — APPOINTMENT (OUTPATIENT)
Dept: CARDIOLOGY | Facility: CLINIC | Age: 63
End: 2023-04-13
Payer: COMMERCIAL

## 2023-04-13 ENCOUNTER — NON-APPOINTMENT (OUTPATIENT)
Age: 63
End: 2023-04-13

## 2023-04-13 ENCOUNTER — APPOINTMENT (OUTPATIENT)
Dept: CARDIOLOGY | Facility: CLINIC | Age: 63
End: 2023-04-13

## 2023-04-13 VITALS
BODY MASS INDEX: 21.61 KG/M2 | TEMPERATURE: 97.5 F | DIASTOLIC BLOOD PRESSURE: 77 MMHG | OXYGEN SATURATION: 98 % | SYSTOLIC BLOOD PRESSURE: 118 MMHG | RESPIRATION RATE: 18 BRPM | WEIGHT: 122 LBS | HEART RATE: 77 BPM

## 2023-04-13 LAB
ALBUMIN SERPL ELPH-MCNC: 4.4 G/DL
ALP BLD-CCNC: 90 U/L
ALT SERPL-CCNC: 126 U/L
ANION GAP SERPL CALC-SCNC: 14 MMOL/L
AST SERPL-CCNC: 182 U/L
BILIRUB SERPL-MCNC: 0.5 MG/DL
BUN SERPL-MCNC: 6 MG/DL
CALCIUM SERPL-MCNC: 9.4 MG/DL
CHLORIDE SERPL-SCNC: 98 MMOL/L
CK SERPL-CCNC: 6776 U/L
CO2 SERPL-SCNC: 23 MMOL/L
CREAT SERPL-MCNC: 0.38 MG/DL
EGFR: 113 ML/MIN/1.73M2
GLUCOSE SERPL-MCNC: 137 MG/DL
HCT VFR BLD CALC: 36.8 %
HGB BLD-MCNC: 12.1 G/DL
MCHC RBC-ENTMCNC: 30.2 PG
MCHC RBC-ENTMCNC: 32.9 GM/DL
MCV RBC AUTO: 91.8 FL
MYOGLOBIN SERPL-MCNC: 844 NG/ML
PLATELET # BLD AUTO: 255 K/UL
POTASSIUM SERPL-SCNC: 4 MMOL/L
PROT SERPL-MCNC: 8 G/DL
RBC # BLD: 4.01 M/UL
RBC # FLD: 14.8 %
SODIUM SERPL-SCNC: 135 MMOL/L
WBC # FLD AUTO: 3.82 K/UL

## 2023-04-13 PROCEDURE — 99215 OFFICE O/P EST HI 40 MIN: CPT

## 2023-04-16 ENCOUNTER — TRANSCRIPTION ENCOUNTER (OUTPATIENT)
Age: 63
End: 2023-04-16

## 2023-04-17 ENCOUNTER — APPOINTMENT (OUTPATIENT)
Dept: CARDIOLOGY | Facility: CLINIC | Age: 63
End: 2023-04-17
Payer: COMMERCIAL

## 2023-04-17 PROCEDURE — 93306 TTE W/DOPPLER COMPLETE: CPT

## 2023-04-18 ENCOUNTER — LABORATORY RESULT (OUTPATIENT)
Age: 63
End: 2023-04-18

## 2023-04-18 ENCOUNTER — APPOINTMENT (OUTPATIENT)
Dept: RHEUMATOLOGY | Facility: CLINIC | Age: 63
End: 2023-04-18
Payer: COMMERCIAL

## 2023-04-18 VITALS
BODY MASS INDEX: 21.62 KG/M2 | DIASTOLIC BLOOD PRESSURE: 93 MMHG | SYSTOLIC BLOOD PRESSURE: 153 MMHG | HEIGHT: 63 IN | WEIGHT: 122 LBS | HEART RATE: 58 BPM | OXYGEN SATURATION: 98 % | TEMPERATURE: 97.1 F | RESPIRATION RATE: 16 BRPM

## 2023-04-18 DIAGNOSIS — Z79.899 OTHER LONG TERM (CURRENT) DRUG THERAPY: ICD-10-CM

## 2023-04-18 PROCEDURE — 99214 OFFICE O/P EST MOD 30 MIN: CPT | Mod: GC

## 2023-04-18 NOTE — ASSESSMENT
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 She fell and hit her chest, R side of her face but never lost consciousness. She got herself up and was able to walk back home. She has been having a headache since then. She saw her PCP on 4/3/23, who obtained an ECG showing new T wave abnormalities so sent to cardiology for further evaluation. At baseline, she states she tries to walk at least 7000 steps daily without chest pain or SOB on exertion. No orthopnea PND or LE edema. Her symptoms with the myositis usually involve soreness of her b/l legs. Of note, her last , , HDL 63, Trig 124 on 4/4/23, a1c 7.4, ESR over 119, CK 5602, and transaminitis.\par \par ECG 4/3/23 shows NSR with diffuse T wave abnormalities (not present in ECG 6/10/21)\par TTE 8/14/20 showed hyperdynamic LV EF 75-80%, normal diastolic function, normal RV size and function.\par Meds:  losartan 25mg daily, metformin 500 bid\par \par 1) Abnormal ECG,\par - ECG 4/3/23 reviewed NSR with diffuse T wave abnormalities (not present in ECG 6/10/21)\par - No chest pain or shortness of breath\par - Given new T wave abnormalities, history of myositis/immune disorder, and recent stress/fall, I advised pt to obtain TTE to r/o new LV dysfunction, r/o [place]o, r/o myopericarditis - completed 4/17/23 which showed hyperdynamic LVEF 70-75%, normal diastolic function, normal RV size/function, no significant valvular disease and no pericardial effusion (results communicated with patient)\par - Given her risk factors, we discussed closely monitoring for CP/SOB, if pt develops any symptoms of CP/SOB, she would benefit from additional testing (i.e. CTA coronaries to r/o CAD)\par \par 2) HLD\par 3) statin-induced myositis\par - We had a long discussion regarding her elevated cholesterol and need to control her HLD. There are other options including ezetimibe, PCKS9 inhibitors, ?bempidoic acid, which may be helpful in her case. I referred patient to Dr. Fawad Herrera, preventive cardiologist/lipid specialist for further evaluation. Patient is in agreement with this plan.\par - f/u rheumatology for management of myositis\par \par 4) Follow-up, 3 months or sooner if symptomatic

## 2023-04-18 NOTE — HISTORY OF PRESENT ILLNESS
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 She fell and hit her chest, R side of her face but never lost consciousness. She got herself up and was able to walk back home. She has been having a headache since then. She saw her PCP on 4/3/23, who obtained an ECG showing new T wave abnormalities so sent to cardiology for further evaluation. At baseline, she states she tries to walk at least 7000 steps daily without chest pain or SOB on exertion. No orthopnea, PND or LE edema. Her symptoms with the myositis usually involve soreness of her b/l legs. Of note, her last , , HDL 63, Trig 124 on 4/4/23, a1c 7.4, ESR over 119, CK 5602, and transaminitis.\par \par ECG 4/3/23 shows NSR with diffuse T wave abnormalities (not present in ECG 6/10/21)\par TTE 8/14/20 showed hyperdynamic LV EF 75-80%, normal diastolic function, normal RV size and function.\par Meds:  losartan 25mg daily, metformin 500 bid\par \par Last seen by Dr. Burden 6/10/21 for bradycardia, Holter showed average HR 62, rare APCs, 5 beat run of pAT.\par \par

## 2023-04-20 LAB
ALBUMIN SERPL ELPH-MCNC: 4.5 G/DL
ALP BLD-CCNC: 95 U/L
ALT SERPL-CCNC: 111 U/L
ANION GAP SERPL CALC-SCNC: 14 MMOL/L
AST SERPL-CCNC: 116 U/L
B2 GLYCOPROT1 IGA SERPL IA-ACNC: <5 SAU
B2 GLYCOPROT1 IGG SER-ACNC: <5 SGU
B2 GLYCOPROT1 IGM SER-ACNC: <5 SMU
BASOPHILS # BLD AUTO: 0.05 K/UL
BASOPHILS NFR BLD AUTO: 0.9 %
BILIRUB SERPL-MCNC: 0.8 MG/DL
BUN SERPL-MCNC: 8 MG/DL
CALCIUM SERPL-MCNC: 9.3 MG/DL
CARDIOLIPIN IGM SER-MCNC: 5.3 MPL
CARDIOLIPIN IGM SER-MCNC: 7.4 GPL
CCP AB SER IA-ACNC: <8 UNITS
CHLORIDE SERPL-SCNC: 97 MMOL/L
CK SERPL-CCNC: 3688 U/L
CO2 SERPL-SCNC: 25 MMOL/L
CREAT SERPL-MCNC: 0.37 MG/DL
DEPRECATED CARDIOLIPIN IGA SER: 7.4 APL
DSDNA AB SER-ACNC: 28 IU/ML
EGFR: 114 ML/MIN/1.73M2
ENA RNP AB SER IA-ACNC: 2.9 AL
ENA SM AB SER IA-ACNC: <0.2 AL
ENA SS-A AB SER IA-ACNC: 2 AL
ENA SS-B AB SER IA-ACNC: 0.2 AL
EOSINOPHIL # BLD AUTO: 0.05 K/UL
EOSINOPHIL NFR BLD AUTO: 0.9 %
GLUCOSE SERPL-MCNC: 152 MG/DL
HBV CORE IGG+IGM SER QL: REACTIVE
HBV CORE IGM SER QL: NONREACTIVE
HCT VFR BLD CALC: 39.6 %
HEPB DNA PCR INT: NOT DETECTED
HEPB DNA PCR LOG: NOT DETECTED LOGIU/ML
HGB BLD-MCNC: 12.7 G/DL
IMM GRANULOCYTES NFR BLD AUTO: 0.2 %
LYMPHOCYTES # BLD AUTO: 1.03 K/UL
LYMPHOCYTES NFR BLD AUTO: 19.3 %
MAN DIFF?: NORMAL
MCHC RBC-ENTMCNC: 29.6 PG
MCHC RBC-ENTMCNC: 32.1 GM/DL
MCV RBC AUTO: 92.3 FL
MONOCYTES # BLD AUTO: 0.21 K/UL
MONOCYTES NFR BLD AUTO: 3.9 %
MYOGLOBIN SERPL-MCNC: 686 NG/ML
NEUTROPHILS # BLD AUTO: 3.99 K/UL
NEUTROPHILS NFR BLD AUTO: 74.8 %
PLATELET # BLD AUTO: NORMAL K/UL
POTASSIUM SERPL-SCNC: 3.8 MMOL/L
PROT SERPL-MCNC: 9.2 G/DL
RBC # BLD: 4.29 M/UL
RBC # FLD: 14.9 %
RF+CCP IGG SER-IMP: NEGATIVE
RHEUMATOID FACT SER QL: <10 IU/ML
SILICA CLOTTING TIME INTERPRETATION: NORMAL
SILICA CLOTTING TIME S/C: 0.93 RATIO
SODIUM SERPL-SCNC: 136 MMOL/L
WBC # FLD AUTO: 5.34 K/UL

## 2023-04-20 NOTE — REVIEW OF SYSTEMS
[Limb Weakness] : limb weakness [Muscle Weakness] : muscle weakness [As Noted in HPI] : as noted in HPI [Feelings Of Weakness] : feelings of weakness [Negative] : Psychiatric [de-identified] : +livedo rash on b/l arms and thighs

## 2023-04-20 NOTE — PHYSICAL EXAM
[General Appearance - Alert] : alert [General Appearance - In No Acute Distress] : in no acute distress [Sclera] : the sclera and conjunctiva were normal [PERRL With Normal Accommodation] : pupils were equal in size, round, and reactive to light [Extraocular Movements] : extraocular movements were intact [Outer Ear] : the ears and nose were normal in appearance [Oropharynx] : the oropharynx was normal [Neck Appearance] : the appearance of the neck was normal [Neck Cervical Mass (___cm)] : no neck mass was observed [] : no respiratory distress [Auscultation Breath Sounds / Voice Sounds] : lungs were clear to auscultation bilaterally [Heart Rate And Rhythm] : heart rate was normal and rhythm regular [Heart Sounds] : normal S1 and S2 [Full Pulse] : the pedal pulses are present [Edema] : there was no peripheral edema [Cervical Lymph Nodes Enlarged Posterior Bilaterally] : posterior cervical [Cervical Lymph Nodes Enlarged Anterior Bilaterally] : anterior cervical [Supraclavicular Lymph Nodes Enlarged Bilaterally] : supraclavicular [No CVA Tenderness] : no ~M costovertebral angle tenderness [No Spinal Tenderness] : no spinal tenderness [No Focal Deficits] : no focal deficits [Oriented To Time, Place, And Person] : oriented to person, place, and time [Impaired Insight] : insight and judgment were intact [Affect] : the affect was normal [Examination Of The Oral Cavity] : the lips and gums were normal [Bowel Sounds] : normal bowel sounds [Abdomen Soft] : soft [Abdomen Tenderness] : non-tender [FreeTextEntry1] : levido reticularis on b/l forearms and thighs

## 2023-04-20 NOTE — ASSESSMENT
[FreeTextEntry1] : 62 year old female with recently diagnosed statin induced myositis here for follow up\par \par 1. 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 Elevated HMGCoA Reductase antibody > 200. 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 Started on high dose Prednisone with improving CPK and LFTs, came off steroids and was on IVIg home infusion bimonthly. Had an increase in CPK concerning for failure of monotherapy with IVIG. Was started on steroid taper with good response but with tapering CK started to get elevated again. Started MMF in Apr 2022 - while there has been good response with decrease in CPK, also noted to have leukopenia. She was thus transitioned to Tacrolimus but discontinued it shortly after. She was briefly taking chinese herbal medication under the guidance of a chinese rheumatologist. Now takes it on and off due to GI discomfort with this medication. Continuing IVIg infusions monthly. 4/1/23 pt had fall 2/2 leg weakness w/ CK 6K, started on prednisone 20mg 4/13. Normal muscle strength 4/18 while on prednisone 20mg.\par -obtain labs today\par -will restart tacrolimus 1mg BID - as pt had prior success w/ this med\par -c/w bimonthly IVIG\par -steroid taper 20mg/15/10/5 - taper weekly\par -she may have false positive Ro60/Ro52 titers as she is receiving IVIG - her levels were negative prior to IVIG\par \par 2. hx of Leukopenia: drug induced 2/2 cellcept\par -now resolved\par -will obtain cbc \par \par 3. CT chest, abdomen and pelvis w/o ILD or evidence of malignancy. US thyroid also negative for malignancy but has thyroid nodules. WIll f/u with Endocrine.  Also advised to f/u with PCP and Gyn for malignancy screening as her mammogram, colonoscopy and PAP are all due since the pandemic. \par \par 4. Bone health: Ca+D as needed. Baseline DEXA reviewed. Off steroids so will hold off on BPP therapy.  \par \par Follow up in 8 weeks\par \par Patient seen and discussed with Dr. Villeda \par \par Carroll Scott\par PGY4

## 2023-04-20 NOTE — HISTORY OF PRESENT ILLNESS
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 Was walking, then felt heaviness and weakness in legs and fell. Called PCP who ordered blood work. Referred to cardiologist for abnormal EKG, had TTE w/ cards which was wnl. Has hyperlipidemia and needs medical therapy, however cannot be on statin, being referred to specialist for hyperlipidemia. Denies sob or dysphagia. Since her CK was higher 5K, has been on prednisone 20mg since 4/13. Has not felt much of an improvement in soreness/heaviness of legs since being on prednisone. \par  [Anorexia] : no anorexia [Weight Loss] : no weight loss [Malaise] : no malaise [Fever] : no fever [Chills] : no chills [Fatigue] : no fatigue [Depression] : no depression [Malar Facial Rash] : no malar facial rash [Skin Lesions] : no lesions [Skin Nodules] : no skin nodules [Oral Ulcers] : no oral ulcers [Cough] : no cough [Dry Mouth] : no dry mouth [Dysphonia] : no dysphonia [Dysphagia] : no dysphagia [Shortness of Breath] : no shortness of breath [Chest Pain] : no chest pain [Arthralgias] : no arthralgias [Joint Swelling] : no joint swelling [Joint Warmth] : no joint warmth [Joint Deformity] : no joint deformity [Decreased ROM] : no decreased range of motion [Morning Stiffness] : no morning stiffness [Falls] : no falls [Difficulty Standing] : no difficulty standing [Difficulty Walking] : no difficulty walking [Dyspnea] : no dyspnea [Myalgias] : no myalgias [Muscle Spasms] : no muscle spasms [Muscle Cramping] : no muscle cramping [Visual Changes] : no visual changes [Eye Pain] : no eye pain [Eye Redness] : no eye redness [Dry Eyes] : no dry eyes [TextBox_2] : August 2020 [TextBox_19] : High dose Prednisone [TextBox_24] :  1. Symmetric nonspecific myositis of the bilateral pelvic and hamstring muscles with greatest involvement of the iliopsoas and proximal rectus femoris muscles.  [TextBox_27] : necrotizing myositis [TextBox_43] : Aug 2020 - clear lungs

## 2023-04-27 ENCOUNTER — NON-APPOINTMENT (OUTPATIENT)
Age: 63
End: 2023-04-27

## 2023-04-27 ENCOUNTER — APPOINTMENT (OUTPATIENT)
Dept: CARDIOLOGY | Facility: CLINIC | Age: 63
End: 2023-04-27
Payer: COMMERCIAL

## 2023-04-27 VITALS
BODY MASS INDEX: 21.79 KG/M2 | SYSTOLIC BLOOD PRESSURE: 126 MMHG | HEIGHT: 63 IN | DIASTOLIC BLOOD PRESSURE: 81 MMHG | OXYGEN SATURATION: 99 % | HEART RATE: 69 BPM | WEIGHT: 123 LBS

## 2023-04-27 DIAGNOSIS — R74.01 ELEVATION OF LEVELS OF LIVER TRANSAMINASE LEVELS: ICD-10-CM

## 2023-04-27 DIAGNOSIS — E78.5 HYPERLIPIDEMIA, UNSPECIFIED: ICD-10-CM

## 2023-04-27 DIAGNOSIS — R94.31 ABNORMAL ELECTROCARDIOGRAM [ECG] [EKG]: ICD-10-CM

## 2023-04-27 PROCEDURE — 93000 ELECTROCARDIOGRAM COMPLETE: CPT | Mod: 59

## 2023-04-27 PROCEDURE — 99215 OFFICE O/P EST HI 40 MIN: CPT | Mod: 25

## 2023-04-27 PROCEDURE — 97802 MEDICAL NUTRITION INDIV IN: CPT | Mod: 95

## 2023-04-27 PROCEDURE — 99402 PREV MED CNSL INDIV APPRX 30: CPT

## 2023-05-03 ENCOUNTER — TRANSCRIPTION ENCOUNTER (OUTPATIENT)
Age: 63
End: 2023-05-03

## 2023-05-04 ENCOUNTER — TRANSCRIPTION ENCOUNTER (OUTPATIENT)
Age: 63
End: 2023-05-04

## 2023-05-05 ENCOUNTER — TRANSCRIPTION ENCOUNTER (OUTPATIENT)
Age: 63
End: 2023-05-05

## 2023-05-05 LAB
ALBUMIN SERPL ELPH-MCNC: 4.3 G/DL
ALP BLD-CCNC: 84 U/L
ALT SERPL-CCNC: 140 U/L
ANION GAP SERPL CALC-SCNC: 11 MMOL/L
AST SERPL-CCNC: 139 U/L
BASOPHILS # BLD AUTO: 0.03 K/UL
BASOPHILS NFR BLD AUTO: 0.6 %
BILIRUB SERPL-MCNC: 0.6 MG/DL
BUN SERPL-MCNC: 12 MG/DL
CALCIUM SERPL-MCNC: 9.8 MG/DL
CHLORIDE SERPL-SCNC: 99 MMOL/L
CK SERPL-CCNC: 4711 U/L
CO2 SERPL-SCNC: 26 MMOL/L
CREAT SERPL-MCNC: 0.43 MG/DL
EGFR: 109 ML/MIN/1.73M2
EOSINOPHIL # BLD AUTO: 0.04 K/UL
EOSINOPHIL NFR BLD AUTO: 0.8 %
GLUCOSE SERPL-MCNC: 188 MG/DL
HCT VFR BLD CALC: 41.7 %
HGB BLD-MCNC: 13.8 G/DL
IMM GRANULOCYTES NFR BLD AUTO: 0.2 %
LYMPHOCYTES # BLD AUTO: 0.9 K/UL
LYMPHOCYTES NFR BLD AUTO: 17.8 %
MAN DIFF?: NORMAL
MCHC RBC-ENTMCNC: 31.1 PG
MCHC RBC-ENTMCNC: 33.1 GM/DL
MCV RBC AUTO: 93.9 FL
MONOCYTES # BLD AUTO: 0.21 K/UL
MONOCYTES NFR BLD AUTO: 4.2 %
MYOGLOBIN SERPL-MCNC: 1072 NG/ML
NEUTROPHILS # BLD AUTO: 3.86 K/UL
NEUTROPHILS NFR BLD AUTO: 76.4 %
PLATELET # BLD AUTO: 234 K/UL
POTASSIUM SERPL-SCNC: 4.8 MMOL/L
PROT SERPL-MCNC: 8.6 G/DL
RBC # BLD: 4.44 M/UL
RBC # FLD: 15.5 %
SODIUM SERPL-SCNC: 136 MMOL/L
WBC # FLD AUTO: 5.05 K/UL

## 2023-05-05 RX ORDER — TACROLIMUS 1 MG/1
1 CAPSULE ORAL
Qty: 120 | Refills: 0 | Status: DISCONTINUED | COMMUNITY
Start: 2022-07-28 | End: 2023-05-05

## 2023-05-09 ENCOUNTER — OUTPATIENT (OUTPATIENT)
Dept: OUTPATIENT SERVICES | Facility: HOSPITAL | Age: 63
LOS: 1 days | End: 2023-05-09
Payer: COMMERCIAL

## 2023-05-09 ENCOUNTER — APPOINTMENT (OUTPATIENT)
Dept: CT IMAGING | Facility: CLINIC | Age: 63
End: 2023-05-09
Payer: COMMERCIAL

## 2023-05-09 DIAGNOSIS — E78.5 HYPERLIPIDEMIA, UNSPECIFIED: ICD-10-CM

## 2023-05-09 PROCEDURE — 75571 CT HRT W/O DYE W/CA TEST: CPT

## 2023-05-09 PROCEDURE — 75571 CT HRT W/O DYE W/CA TEST: CPT | Mod: 26

## 2023-05-12 LAB
EJ AB SER QL: NEGATIVE
ENA JO1 AB SER IA-ACNC: <20 UNITS
ENA PM/SCL AB SER-ACNC: <20 UNITS
ENA SM+RNP AB SER IA-ACNC: <20 UNITS
ENA SS-A IGG SER QL: 35 UNITS
FIBRILLARIN AB SER QL: NEGATIVE
KU AB SER QL: NEGATIVE
MDA-5 (P140)(CADM-140): <20 UNITS
MI2 AB SER QL: NEGATIVE
NXP-2 (P140): <20 UNITS
OJ AB SER QL: NEGATIVE
PL12 AB SER QL: NEGATIVE
PL7 AB SER QL: NEGATIVE
SRP AB SERPL QL: NEGATIVE
TIF GAMMA (P155/140): <20 UNITS
U2 SNRNP AB SER QL: NEGATIVE

## 2023-05-22 ENCOUNTER — TRANSCRIPTION ENCOUNTER (OUTPATIENT)
Age: 63
End: 2023-05-22

## 2023-05-25 ENCOUNTER — TRANSCRIPTION ENCOUNTER (OUTPATIENT)
Age: 63
End: 2023-05-25

## 2023-05-25 LAB
ALBUMIN SERPL ELPH-MCNC: 4.5 G/DL
ALP BLD-CCNC: 84 U/L
ALT SERPL-CCNC: 122 U/L
ANION GAP SERPL CALC-SCNC: 12 MMOL/L
AST SERPL-CCNC: 100 U/L
BILIRUB SERPL-MCNC: 0.4 MG/DL
BUN SERPL-MCNC: 16 MG/DL
CALCIUM SERPL-MCNC: 9.7 MG/DL
CHLORIDE SERPL-SCNC: 100 MMOL/L
CK SERPL-CCNC: 3521 U/L
CO2 SERPL-SCNC: 26 MMOL/L
CREAT SERPL-MCNC: 0.46 MG/DL
EGFR: 107 ML/MIN/1.73M2
GLUCOSE SERPL-MCNC: 168 MG/DL
MYOGLOBIN SERPL-MCNC: 935 NG/ML
POTASSIUM SERPL-SCNC: 4.6 MMOL/L
PROT SERPL-MCNC: 8.8 G/DL
SODIUM SERPL-SCNC: 137 MMOL/L

## 2023-05-26 ENCOUNTER — TRANSCRIPTION ENCOUNTER (OUTPATIENT)
Age: 63
End: 2023-05-26

## 2023-06-01 DIAGNOSIS — R74.8 ABNORMAL LEVELS OF OTHER SERUM ENZYMES: ICD-10-CM

## 2023-06-07 ENCOUNTER — TRANSCRIPTION ENCOUNTER (OUTPATIENT)
Age: 63
End: 2023-06-07

## 2023-06-07 LAB
ALBUMIN SERPL ELPH-MCNC: 4.3 G/DL
ALP BLD-CCNC: 74 U/L
ALT SERPL-CCNC: 107 U/L
ANION GAP SERPL CALC-SCNC: 12 MMOL/L
AST SERPL-CCNC: 86 U/L
BILIRUB SERPL-MCNC: 0.6 MG/DL
BUN SERPL-MCNC: 11 MG/DL
CALCIUM SERPL-MCNC: 9.9 MG/DL
CHLORIDE SERPL-SCNC: 97 MMOL/L
CK SERPL-CCNC: 3114 U/L
CO2 SERPL-SCNC: 23 MMOL/L
CREAT SERPL-MCNC: 0.42 MG/DL
EGFR: 110 ML/MIN/1.73M2
GLUCOSE SERPL-MCNC: 207 MG/DL
LDH SERPL-CCNC: 452 U/L
POTASSIUM SERPL-SCNC: 4.2 MMOL/L
PROT SERPL-MCNC: 8.6 G/DL
SODIUM SERPL-SCNC: 131 MMOL/L

## 2023-06-09 ENCOUNTER — TRANSCRIPTION ENCOUNTER (OUTPATIENT)
Age: 63
End: 2023-06-09

## 2023-06-12 ENCOUNTER — RX RENEWAL (OUTPATIENT)
Age: 63
End: 2023-06-12

## 2023-06-15 ENCOUNTER — RX RENEWAL (OUTPATIENT)
Age: 63
End: 2023-06-15

## 2023-06-19 LAB
ALBUMIN SERPL ELPH-MCNC: 4.5 G/DL
ALP BLD-CCNC: 70 U/L
ALT SERPL-CCNC: 90 U/L
ANION GAP SERPL CALC-SCNC: 13 MMOL/L
AST SERPL-CCNC: 74 U/L
BILIRUB SERPL-MCNC: 0.6 MG/DL
BUN SERPL-MCNC: 12 MG/DL
CALCIUM SERPL-MCNC: 9.7 MG/DL
CHLORIDE SERPL-SCNC: 100 MMOL/L
CK SERPL-CCNC: 2869 U/L
CO2 SERPL-SCNC: 26 MMOL/L
CREAT SERPL-MCNC: 0.45 MG/DL
CRP SERPL-MCNC: <3 MG/L
EGFR: 108 ML/MIN/1.73M2
ERYTHROCYTE [SEDIMENTATION RATE] IN BLOOD BY WESTERGREN METHOD: 51 MM/HR
GLUCOSE SERPL-MCNC: 173 MG/DL
MYOGLOBIN SERPL-MCNC: 480 NG/ML
POTASSIUM SERPL-SCNC: 4.5 MMOL/L
PROT SERPL-MCNC: 8.7 G/DL
SODIUM SERPL-SCNC: 138 MMOL/L

## 2023-06-20 ENCOUNTER — APPOINTMENT (OUTPATIENT)
Dept: RHEUMATOLOGY | Facility: CLINIC | Age: 63
End: 2023-06-20
Payer: COMMERCIAL

## 2023-06-20 VITALS
SYSTOLIC BLOOD PRESSURE: 142 MMHG | WEIGHT: 121 LBS | HEART RATE: 75 BPM | TEMPERATURE: 98 F | DIASTOLIC BLOOD PRESSURE: 81 MMHG | HEIGHT: 63 IN | BODY MASS INDEX: 21.44 KG/M2

## 2023-06-20 PROCEDURE — 99214 OFFICE O/P EST MOD 30 MIN: CPT

## 2023-06-20 RX ORDER — NIRMATRELVIR AND RITONAVIR 300-100 MG
20 X 150 MG & KIT ORAL
Qty: 30 | Refills: 0 | Status: DISCONTINUED | COMMUNITY
Start: 2022-12-03 | End: 2023-06-20

## 2023-06-20 NOTE — HISTORY OF PRESENT ILLNESS
[___ Month(s) Ago] : [unfilled] month(s) ago [FreeTextEntry1] : Patient on Tacrolimus 3 mg + IVIG bi-monthly + Prednisone 10 mg daily - labs with minimal improvement in CPK. C/o LE weakness.  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 [IVIG] : IVIG [Upper extremity weakness] : upper extremity weakness [Lower extremities weakness] : lower extremities weakness [Currently Experiencing] : currently [Anorexia] : no anorexia [Weight Loss] : no weight loss [Malaise] : no malaise [Fever] : no fever [Chills] : no chills [Fatigue] : no fatigue [Depression] : no depression [Malar Facial Rash] : no malar facial rash [Skin Lesions] : no lesions [Skin Nodules] : no skin nodules [Oral Ulcers] : no oral ulcers [Cough] : no cough [Dry Mouth] : no dry mouth [Dysphonia] : no dysphonia [Dysphagia] : no dysphagia [Shortness of Breath] : no shortness of breath [Chest Pain] : no chest pain [Arthralgias] : no arthralgias [Joint Swelling] : no joint swelling [Joint Warmth] : no joint warmth [Joint Deformity] : no joint deformity [Decreased ROM] : no decreased range of motion [Morning Stiffness] : no morning stiffness [Falls] : no falls [Difficulty Standing] : no difficulty standing [Difficulty Walking] : no difficulty walking [Dyspnea] : no dyspnea [Myalgias] : no myalgias [Muscle Weakness] : muscle weakness [Muscle Spasms] : no muscle spasms [Muscle Cramping] : no muscle cramping [Visual Changes] : no visual changes [Eye Pain] : no eye pain [Eye Redness] : no eye redness [Dry Eyes] : no dry eyes [TextBox_2] : August 2020 [TextBox_19] : High dose Prednisone [TextBox_24] :  1. Symmetric nonspecific myositis of the bilateral pelvic and hamstring muscles with greatest involvement of the iliopsoas and proximal rectus femoris muscles.  [TextBox_43] : Aug 2020 - clear lungs  [TextBox_27] : necrotizing myositis

## 2023-06-20 NOTE — REVIEW OF SYSTEMS
[As Noted in HPI] : as noted in HPI [Limb Weakness] : limb weakness [Muscle Weakness] : muscle weakness [Feelings Of Weakness] : feelings of weakness [Negative] : Psychiatric [de-identified] : +livedo rash on b/l arms and thighs

## 2023-06-20 NOTE — ASSESSMENT
[FreeTextEntry1] : 62 year old female with recently diagnosed statin induced myositis here for follow up\par \par 1. 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 Elevated HMGCoA Reductase antibody > 200. 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 Started on high dose Prednisone with improving CPK and LFTs, came off steroids and was on IVIg home infusion bimonthly. Had an increase in CPK concerning for failure of monotherapy with IVIG. Was started on steroid taper with good response but with tapering CK started to get elevated again. Started MMF in Apr 2022 - while there has been good response with decrease in CPK, also noted to have leukopenia. She was thus transitioned to Tacrolimus but discontinued it shortly after. She was briefly taking chinese herbal medication under the guidance of a chinese rheumatologist. Now takes it on and off due to GI discomfort with this medication. Continuing IVIg infusions monthly. Since April 2023 had a flare of her myositis for which she was restarted on Prednisone which has now been tapered to 10 mg daily as well as Tacrolimus 3 mg daily with minimal improvement in CPK levels. We discussed today regarding switching to Rituximab for refractory myositis. Will start the PA process. In the meantime, will check Tacrolimus whole blood level and consider increasing the dose. Repeat labs in 2 weeks to trend CPK. C/w bimonthly IVIG and steroid taper 7.5 mg x 2 weeks then 5 mg daily \par \par 2. hx of Leukopenia: drug induced 2/2 cellcept, now resolved. Monitor CBC on Tacrolimus  \par \par 3. CT chest, abdomen and pelvis w/o ILD or evidence of malignancy. US thyroid also negative for malignancy but has thyroid nodules. WIll f/u with Endocrine.  Also advised to f/u with PCP and Gyn for malignancy screening as her mammogram, colonoscopy and PAP are all due since the pandemic. \par \par 4. Bone health: Ca+D as needed. Needs f/u DEXA - will order at next visit.   \par \par Follow up in 8 weeks\par

## 2023-06-20 NOTE — PHYSICAL EXAM
[General Appearance - Alert] : alert [General Appearance - In No Acute Distress] : in no acute distress [Sclera] : the sclera and conjunctiva were normal [PERRL With Normal Accommodation] : pupils were equal in size, round, and reactive to light [Extraocular Movements] : extraocular movements were intact [Outer Ear] : the ears and nose were normal in appearance [Examination Of The Oral Cavity] : the lips and gums were normal [Oropharynx] : the oropharynx was normal [Neck Appearance] : the appearance of the neck was normal [Neck Cervical Mass (___cm)] : no neck mass was observed [] : no respiratory distress [Auscultation Breath Sounds / Voice Sounds] : lungs were clear to auscultation bilaterally [Heart Rate And Rhythm] : heart rate was normal and rhythm regular [Heart Sounds] : normal S1 and S2 [Full Pulse] : the pedal pulses are present [Edema] : there was no peripheral edema [Bowel Sounds] : normal bowel sounds [Abdomen Soft] : soft [Abdomen Tenderness] : non-tender [Cervical Lymph Nodes Enlarged Posterior Bilaterally] : posterior cervical [Cervical Lymph Nodes Enlarged Anterior Bilaterally] : anterior cervical [Supraclavicular Lymph Nodes Enlarged Bilaterally] : supraclavicular [No CVA Tenderness] : no ~M costovertebral angle tenderness [No Spinal Tenderness] : no spinal tenderness [FreeTextEntry1] : levido reticularis on b/l forearms and thighs [No Focal Deficits] : no focal deficits [Oriented To Time, Place, And Person] : oriented to person, place, and time [Impaired Insight] : insight and judgment were intact [Affect] : the affect was normal

## 2023-06-21 ENCOUNTER — TRANSCRIPTION ENCOUNTER (OUTPATIENT)
Age: 63
End: 2023-06-21

## 2023-06-21 LAB
25(OH)D3 SERPL-MCNC: 16.5 NG/ML
TACROLIMUS SERPL-MCNC: 55.1 NG/ML

## 2023-06-22 ENCOUNTER — TRANSCRIPTION ENCOUNTER (OUTPATIENT)
Age: 63
End: 2023-06-22

## 2023-06-29 ENCOUNTER — TRANSCRIPTION ENCOUNTER (OUTPATIENT)
Age: 63
End: 2023-06-29

## 2023-06-30 ENCOUNTER — TRANSCRIPTION ENCOUNTER (OUTPATIENT)
Age: 63
End: 2023-06-30

## 2023-07-14 ENCOUNTER — TRANSCRIPTION ENCOUNTER (OUTPATIENT)
Age: 63
End: 2023-07-14

## 2023-07-14 LAB
ALBUMIN SERPL ELPH-MCNC: 4.4 G/DL
ALP BLD-CCNC: 69 U/L
ALT SERPL-CCNC: 74 U/L
ANION GAP SERPL CALC-SCNC: 12 MMOL/L
AST SERPL-CCNC: 75 U/L
BILIRUB SERPL-MCNC: 0.5 MG/DL
BUN SERPL-MCNC: 14 MG/DL
CALCIUM SERPL-MCNC: 9.6 MG/DL
CHLORIDE SERPL-SCNC: 102 MMOL/L
CK SERPL-CCNC: 3377 U/L
CO2 SERPL-SCNC: 24 MMOL/L
CREAT SERPL-MCNC: 0.42 MG/DL
CRP SERPL-MCNC: <3 MG/L
EGFR: 110 ML/MIN/1.73M2
ERYTHROCYTE [SEDIMENTATION RATE] IN BLOOD BY WESTERGREN METHOD: 17 MM/HR
GLUCOSE SERPL-MCNC: 178 MG/DL
MYOGLOBIN SERPL-MCNC: 581 NG/ML
POTASSIUM SERPL-SCNC: 4 MMOL/L
PROT SERPL-MCNC: 7.9 G/DL
SODIUM SERPL-SCNC: 139 MMOL/L
TACROLIMUS SERPL-MCNC: 11.5 NG/ML

## 2023-07-18 ENCOUNTER — TRANSCRIPTION ENCOUNTER (OUTPATIENT)
Age: 63
End: 2023-07-18

## 2023-07-28 ENCOUNTER — LABORATORY RESULT (OUTPATIENT)
Age: 63
End: 2023-07-28

## 2023-08-01 ENCOUNTER — APPOINTMENT (OUTPATIENT)
Dept: RHEUMATOLOGY | Facility: CLINIC | Age: 63
End: 2023-08-01
Payer: COMMERCIAL

## 2023-08-01 VITALS
BODY MASS INDEX: 21.26 KG/M2 | OXYGEN SATURATION: 98 % | SYSTOLIC BLOOD PRESSURE: 136 MMHG | HEIGHT: 63 IN | HEART RATE: 90 BPM | DIASTOLIC BLOOD PRESSURE: 81 MMHG | WEIGHT: 120 LBS

## 2023-08-01 PROCEDURE — 99214 OFFICE O/P EST MOD 30 MIN: CPT | Mod: GC

## 2023-08-03 LAB
ALBUMIN SERPL ELPH-MCNC: 4 G/DL
ALP BLD-CCNC: 63 U/L
ALT SERPL-CCNC: 62 U/L
ANION GAP SERPL CALC-SCNC: 14 MMOL/L
AST SERPL-CCNC: 78 U/L
BILIRUB SERPL-MCNC: 0.5 MG/DL
BUN SERPL-MCNC: 10 MG/DL
CALCIUM SERPL-MCNC: 9.1 MG/DL
CHLORIDE SERPL-SCNC: 98 MMOL/L
CK SERPL-CCNC: 3172 U/L
CO2 SERPL-SCNC: 23 MMOL/L
CREAT SERPL-MCNC: 0.35 MG/DL
CRP SERPL-MCNC: <3 MG/L
EGFR: 115 ML/MIN/1.73M2
ERYTHROCYTE [SEDIMENTATION RATE] IN BLOOD BY WESTERGREN METHOD: 33 MM/HR
GLUCOSE SERPL-MCNC: 157 MG/DL
MYOGLOBIN SERPL-MCNC: 435 NG/ML
POTASSIUM SERPL-SCNC: 5 MMOL/L
PROT SERPL-MCNC: 8.4 G/DL
SODIUM SERPL-SCNC: 136 MMOL/L
TACROLIMUS SERPL-MCNC: 15.8 NG/ML

## 2023-08-04 ENCOUNTER — TRANSCRIPTION ENCOUNTER (OUTPATIENT)
Age: 63
End: 2023-08-04

## 2023-08-04 NOTE — PHYSICAL EXAM
[General Appearance - Alert] : alert [General Appearance - In No Acute Distress] : in no acute distress [Sclera] : the sclera and conjunctiva were normal [Extraocular Movements] : extraocular movements were intact [Outer Ear] : the ears and nose were normal in appearance [Examination Of The Oral Cavity] : the lips and gums were normal [Oropharynx] : the oropharynx was normal [Neck Appearance] : the appearance of the neck was normal [] : no respiratory distress [Auscultation Breath Sounds / Voice Sounds] : lungs were clear to auscultation bilaterally [Heart Rate And Rhythm] : heart rate was normal and rhythm regular [Heart Sounds] : normal S1 and S2 [Edema] : there was no peripheral edema [Abdomen Soft] : soft [Abdomen Tenderness] : non-tender [No Focal Deficits] : no focal deficits [Oriented To Time, Place, And Person] : oriented to person, place, and time [Impaired Insight] : insight and judgment were intact [Affect] : the affect was normal [FreeTextEntry1] : levido reticularis on b/l forearms and thighs

## 2023-08-04 NOTE — REVIEW OF SYSTEMS
[As Noted in HPI] : as noted in HPI [Limb Weakness] : limb weakness [Muscle Weakness] : muscle weakness [Feelings Of Weakness] : feelings of weakness [Negative] : Psychiatric [de-identified] : +livedo rash on b/l arms and thighs

## 2023-08-04 NOTE — HISTORY OF PRESENT ILLNESS
[___ Month(s) Ago] : [unfilled] month(s) ago 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 [IVIG] : IVIG [Upper extremity weakness] : upper extremity weakness [Lower extremities weakness] : lower extremities weakness [Currently Experiencing] : currently [Muscle Weakness] : muscle weakness [FreeTextEntry1] : Last seen in clinic a month ago. Completed prednisone taper yesterday. Currently on Tacrolimus 2mg AM and 1mg PM along with IVIG bi-monthly. Labs with minimal improvement in CK. Continues to endorse upper and lower extremity weakness. Is able to walk 5,000 steps a day, but finds it harder than baseline. Also finds objects are heavier than baseline. Endorsing "heavy" breathing. Denies actual SOB, but finds her breathing gets heavier whenever she steps outside. Is not tied to rest or exertion. Denies CP, Palpitations or cough.  Denies dysphagia or rashes.  [Anorexia] : no anorexia [Weight Loss] : no weight loss [Malaise] : no malaise [Fever] : no fever [Chills] : no chills [Fatigue] : no fatigue [Depression] : no depression [Malar Facial Rash] : no malar facial rash [Skin Lesions] : no lesions [Skin Nodules] : no skin nodules [Oral Ulcers] : no oral ulcers [Cough] : no cough [Dry Mouth] : no dry mouth [Dysphonia] : no dysphonia [Dysphagia] : no dysphagia [Shortness of Breath] : no shortness of breath [Chest Pain] : no chest pain [Arthralgias] : no arthralgias [Joint Swelling] : no joint swelling [Joint Warmth] : no joint warmth [Joint Deformity] : no joint deformity [Decreased ROM] : no decreased range of motion [Morning Stiffness] : no morning stiffness [Falls] : no falls [Difficulty Standing] : no difficulty standing [Difficulty Walking] : no difficulty walking [Dyspnea] : no dyspnea [Myalgias] : no myalgias [Muscle Spasms] : no muscle spasms [Muscle Cramping] : no muscle cramping [Visual Changes] : no visual changes [Eye Pain] : no eye pain [Eye Redness] : no eye redness [Dry Eyes] : no dry eyes [TextBox_2] : August 2020 [TextBox_19] : High dose Prednisone [TextBox_24] :  1. Symmetric nonspecific myositis of the bilateral pelvic and hamstring muscles with greatest involvement of the iliopsoas and proximal rectus femoris muscles.  [TextBox_27] : necrotizing myositis [TextBox_43] : Aug 2020 - clear lungs

## 2023-08-04 NOTE — ASSESSMENT
[FreeTextEntry1] : 63 year old female with recently diagnosed statin induced myositis here for follow up  1. 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 Elevated HMGCoA Reductase antibody > 200. 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 Started on high dose Prednisone with improving CPK and LFTs, came off steroids and was on IVIg home infusion bimonthly. Had an increase in CPK concerning for failure of monotherapy with IVIG. Was started on steroid taper with good response but with tapering CK started to get elevated again. Started MMF in Apr 2022 - while there has been good response with decrease in CPK, also noted to have leukopenia. She was thus transitioned to Tacrolimus but discontinued it shortly after. She was briefly taking chinese herbal medication under the guidance of a chinese rheumatologist. Now takes it on and off due to GI discomfort with this medication. Continuing IVIg infusions monthly. Since April 2023 had a flare of her myositis for which she was restarted on Prednisone which has now been tapered off with minimal improvement in CPK levels. Plan is to now switch to rituximab. PA denied by insurance company, however sent request to drug company for doses. Pending approval, will continue with bimonthly IVIG (will consider changing type of IVIG) and will increase Tacrolimus to 2mg BID since tacrolimus levels have come back wnl. Continue to monitor off steroids.   2. hx of Leukopenia: drug induced 2/2 cellcept, now resolved. Monitor CBC on Tacrolimus    3. CT chest, abdomen and pelvis w/o ILD or evidence of malignancy. US thyroid also negative for malignancy but has thyroid nodules. Also advised to f/u with PCP and Gyn for malignancy screening as her mammogram, colonoscopy and PAP (however per pt, waiting until myositis flare is improved prior to pursing cancer screening).   4. Bone health: Ca+D as needed. Needs f/u DEXA - will order at next visit.     Follow up in 2 months   Discussed with Dr. Ronal Bermudez MD PGY-4 Rheumatology

## 2023-08-18 ENCOUNTER — TRANSCRIPTION ENCOUNTER (OUTPATIENT)
Age: 63
End: 2023-08-18

## 2023-08-18 LAB
ALBUMIN SERPL ELPH-MCNC: 4.5 G/DL
ALP BLD-CCNC: 77 U/L
ALT SERPL-CCNC: 38 U/L
ANION GAP SERPL CALC-SCNC: 12 MMOL/L
AST SERPL-CCNC: 47 U/L
BILIRUB SERPL-MCNC: 0.3 MG/DL
BUN SERPL-MCNC: 13 MG/DL
CALCIUM SERPL-MCNC: 9.5 MG/DL
CHLORIDE SERPL-SCNC: 99 MMOL/L
CK SERPL-CCNC: 2689 U/L
CO2 SERPL-SCNC: 24 MMOL/L
CREAT SERPL-MCNC: 0.59 MG/DL
EGFR: 101 ML/MIN/1.73M2
GLUCOSE SERPL-MCNC: 156 MG/DL
MYOGLOBIN SERPL-MCNC: 405 NG/ML
POTASSIUM SERPL-SCNC: 5.2 MMOL/L
PROT SERPL-MCNC: 8.2 G/DL
SODIUM SERPL-SCNC: 135 MMOL/L
TACROLIMUS SERPL-MCNC: 10.7 NG/ML

## 2023-08-19 ENCOUNTER — TRANSCRIPTION ENCOUNTER (OUTPATIENT)
Age: 63
End: 2023-08-19

## 2023-08-30 ENCOUNTER — APPOINTMENT (OUTPATIENT)
Dept: RHEUMATOLOGY | Facility: CLINIC | Age: 63
End: 2023-08-30
Payer: COMMERCIAL

## 2023-08-30 VITALS
HEART RATE: 72 BPM | DIASTOLIC BLOOD PRESSURE: 81 MMHG | OXYGEN SATURATION: 98 % | SYSTOLIC BLOOD PRESSURE: 136 MMHG | RESPIRATION RATE: 16 BRPM | TEMPERATURE: 97.3 F

## 2023-08-30 VITALS
HEART RATE: 71 BPM | SYSTOLIC BLOOD PRESSURE: 125 MMHG | OXYGEN SATURATION: 96 % | RESPIRATION RATE: 16 BRPM | TEMPERATURE: 98.3 F | DIASTOLIC BLOOD PRESSURE: 79 MMHG

## 2023-08-30 VITALS
SYSTOLIC BLOOD PRESSURE: 124 MMHG | HEART RATE: 72 BPM | DIASTOLIC BLOOD PRESSURE: 79 MMHG | OXYGEN SATURATION: 96 % | RESPIRATION RATE: 16 BRPM | TEMPERATURE: 98.2 F

## 2023-08-30 PROCEDURE — 96413 CHEMO IV INFUSION 1 HR: CPT

## 2023-08-30 PROCEDURE — 96374 THER/PROPH/DIAG INJ IV PUSH: CPT | Mod: 59

## 2023-08-30 PROCEDURE — 96415 CHEMO IV INFUSION ADDL HR: CPT

## 2023-08-30 RX ORDER — RITUXIMAB 10 MG/ML
500 INJECTION, SOLUTION INTRAVENOUS
Qty: 0 | Refills: 0 | Status: COMPLETED
Start: 2023-07-18

## 2023-08-30 RX ORDER — METHYLPREDNISOLONE SODIUM SUCCINATE 125 MG/2ML
125 INJECTION, POWDER, FOR SOLUTION INTRAMUSCULAR; INTRAVENOUS
Qty: 0 | Refills: 0 | Status: COMPLETED
Start: 2023-07-18

## 2023-08-30 RX ORDER — CETIRIZINE HYDROCHLORIDE 10 MG/1
10 TABLET, FILM COATED ORAL
Qty: 0 | Refills: 0 | Status: COMPLETED
Start: 2023-07-18

## 2023-08-30 RX ORDER — ACETAMINOPHEN 325 MG/1
325 TABLET ORAL
Qty: 0 | Refills: 0 | Status: COMPLETED
Start: 2023-07-18

## 2023-08-30 NOTE — HISTORY OF PRESENT ILLNESS
[5] : 5 [Denies] : Denies [No] : No [N/A] : N/A [Yes] : Yes [Medication Name: ___] : Medication Name: [unfilled] [IV discontinued. Intact. No signs or symptoms of IV complications noted. Time: ___] : IV discontinued. Intact. No signs or symptoms of IV complications noted. Time: [unfilled] [Patient  instructed to seek medical attention with signs and symptoms of adverse effects] : Patient  instructed to seek medical attention with signs and symptoms of adverse effects [Patient left unit in no acute distress] : Patient left unit in no acute distress [Medications administered as ordered and tolerated well.] : Medications administered as ordered and tolerated well. [Outside pharmacy] : Outside pharmacy [de-identified] : b/l legs [de-identified] : first dose [Start Time: ___] : Medication Start Time: [unfilled] [End Time: ___] : Medication End Time: [unfilled] [de-identified] : right hand dorsal vein  [de-identified] : Patient presents for 1:2 Rituxan infusion, doing well overall. Patient reports having IVIG infusions every 2 weeks and denies having Rituxan infusion in the past. Patient reports having generalized weakness, more towards to b/l legs with soreness related to disease. Reports having occasional headache. No other symptoms or concerns verbalized. Patient denies any recent infection or antibiotic use. Patient oriented to infusion facility. Education and teaching materials provided to patient. Patient verbalized understanding. Patient consented to today's infusion. Patient premedicated as prescribed, titrated infusion tolerated well. Vital signs cycled and stable throughout infusion. No immediate AEs noted. Patient left unit ambulatory in [facility].

## 2023-09-05 ENCOUNTER — RX RENEWAL (OUTPATIENT)
Age: 63
End: 2023-09-05

## 2023-09-05 ENCOUNTER — APPOINTMENT (OUTPATIENT)
Dept: CARDIOLOGY | Facility: CLINIC | Age: 63
End: 2023-09-05

## 2023-09-05 RX ORDER — ERGOCALCIFEROL 1.25 MG/1
1.25 MG CAPSULE, LIQUID FILLED ORAL
Qty: 4 | Refills: 2 | Status: ACTIVE | COMMUNITY
Start: 2023-06-21 | End: 1900-01-01

## 2023-09-20 ENCOUNTER — APPOINTMENT (OUTPATIENT)
Dept: RHEUMATOLOGY | Facility: CLINIC | Age: 63
End: 2023-09-20
Payer: COMMERCIAL

## 2023-09-20 VITALS
RESPIRATION RATE: 16 BRPM | SYSTOLIC BLOOD PRESSURE: 130 MMHG | DIASTOLIC BLOOD PRESSURE: 81 MMHG | HEART RATE: 78 BPM | OXYGEN SATURATION: 99 %

## 2023-09-20 VITALS
RESPIRATION RATE: 16 BRPM | TEMPERATURE: 98.2 F | DIASTOLIC BLOOD PRESSURE: 83 MMHG | HEART RATE: 70 BPM | SYSTOLIC BLOOD PRESSURE: 133 MMHG | OXYGEN SATURATION: 97 %

## 2023-09-20 VITALS
DIASTOLIC BLOOD PRESSURE: 83 MMHG | SYSTOLIC BLOOD PRESSURE: 139 MMHG | OXYGEN SATURATION: 97 % | RESPIRATION RATE: 16 BRPM | HEART RATE: 63 BPM | TEMPERATURE: 97.9 F

## 2023-09-20 PROCEDURE — 96413 CHEMO IV INFUSION 1 HR: CPT

## 2023-09-20 PROCEDURE — 96374 THER/PROPH/DIAG INJ IV PUSH: CPT | Mod: 59

## 2023-09-20 PROCEDURE — 96415 CHEMO IV INFUSION ADDL HR: CPT

## 2023-09-20 RX ORDER — METHYLPREDNISOLONE SODIUM SUCCINATE 125 MG/2ML
125 INJECTION, POWDER, FOR SOLUTION INTRAMUSCULAR; INTRAVENOUS
Qty: 0 | Refills: 0 | Status: COMPLETED
Start: 2023-07-18

## 2023-09-20 RX ORDER — CETIRIZINE HYDROCHLORIDE 10 MG/1
10 TABLET, FILM COATED ORAL
Qty: 0 | Refills: 0 | Status: COMPLETED
Start: 2023-07-18

## 2023-09-20 RX ORDER — ACETAMINOPHEN 325 MG/1
325 TABLET ORAL
Qty: 0 | Refills: 0 | Status: COMPLETED
Start: 2023-07-18

## 2023-09-20 RX ORDER — RITUXIMAB 10 MG/ML
500 INJECTION, SOLUTION INTRAVENOUS
Qty: 0 | Refills: 0 | Status: COMPLETED
Start: 2023-07-18

## 2023-09-25 ENCOUNTER — TRANSCRIPTION ENCOUNTER (OUTPATIENT)
Age: 63
End: 2023-09-25

## 2023-09-25 ENCOUNTER — NON-APPOINTMENT (OUTPATIENT)
Age: 63
End: 2023-09-25

## 2023-09-26 ENCOUNTER — TRANSCRIPTION ENCOUNTER (OUTPATIENT)
Age: 63
End: 2023-09-26

## 2023-09-26 ENCOUNTER — APPOINTMENT (OUTPATIENT)
Dept: NEUROLOGY | Facility: CLINIC | Age: 63
End: 2023-09-26
Payer: COMMERCIAL

## 2023-09-26 VITALS
WEIGHT: 120 LBS | HEIGHT: 63 IN | DIASTOLIC BLOOD PRESSURE: 67 MMHG | HEART RATE: 67 BPM | BODY MASS INDEX: 21.26 KG/M2 | SYSTOLIC BLOOD PRESSURE: 156 MMHG

## 2023-09-26 DIAGNOSIS — Z86.69 PERSONAL HISTORY OF OTHER DISEASES OF THE NERVOUS SYSTEM AND SENSE ORGANS: ICD-10-CM

## 2023-09-26 DIAGNOSIS — Z78.9 OTHER SPECIFIED HEALTH STATUS: ICD-10-CM

## 2023-09-26 DIAGNOSIS — E11.9 TYPE 2 DIABETES MELLITUS W/OUT COMPLICATIONS: ICD-10-CM

## 2023-09-26 DIAGNOSIS — M60.9 MYOSITIS, UNSPECIFIED: ICD-10-CM

## 2023-09-26 DIAGNOSIS — T46.6X5A MYOSITIS, UNSPECIFIED: ICD-10-CM

## 2023-09-26 DIAGNOSIS — R73.03 PREDIABETES.: ICD-10-CM

## 2023-09-26 PROCEDURE — 99205 OFFICE O/P NEW HI 60 MIN: CPT

## 2023-10-13 ENCOUNTER — LABORATORY RESULT (OUTPATIENT)
Age: 63
End: 2023-10-13

## 2023-10-13 ENCOUNTER — TRANSCRIPTION ENCOUNTER (OUTPATIENT)
Age: 63
End: 2023-10-13

## 2023-10-13 LAB
ALBUMIN SERPL ELPH-MCNC: 4.6 G/DL
ALP BLD-CCNC: 74 U/L
ALT SERPL-CCNC: 25 U/L
ANION GAP SERPL CALC-SCNC: 11 MMOL/L
AST SERPL-CCNC: 38 U/L
BASOPHILS # BLD AUTO: 0.1 K/UL
BASOPHILS NFR BLD AUTO: 3.5 %
BILIRUB SERPL-MCNC: 0.5 MG/DL
BUN SERPL-MCNC: 8 MG/DL
CALCIUM SERPL-MCNC: 9.4 MG/DL
CHLORIDE SERPL-SCNC: 103 MMOL/L
CK SERPL-CCNC: 1578 U/L
CO2 SERPL-SCNC: 25 MMOL/L
CREAT SERPL-MCNC: 0.39 MG/DL
CRP SERPL-MCNC: <3 MG/L
EGFR: 112 ML/MIN/1.73M2
EOSINOPHIL # BLD AUTO: 0.25 K/UL
EOSINOPHIL NFR BLD AUTO: 8.7 %
ERYTHROCYTE [SEDIMENTATION RATE] IN BLOOD BY WESTERGREN METHOD: 48 MM/HR
GLUCOSE SERPL-MCNC: 154 MG/DL
HCT VFR BLD CALC: 38.3 %
HGB BLD-MCNC: 12.9 G/DL
LYMPHOCYTES # BLD AUTO: 0.72 K/UL
LYMPHOCYTES NFR BLD AUTO: 25.2 %
MAN DIFF?: NORMAL
MCHC RBC-ENTMCNC: 32.3 PG
MCHC RBC-ENTMCNC: 33.7 GM/DL
MCV RBC AUTO: 96 FL
MONOCYTES # BLD AUTO: 0.17 K/UL
MONOCYTES NFR BLD AUTO: 6.1 %
MYOGLOBIN SERPL-MCNC: 199 NG/ML
NEUTROPHILS # BLD AUTO: 1.62 K/UL
NEUTROPHILS NFR BLD AUTO: 56.5 %
PLATELET # BLD AUTO: 225 K/UL
POTASSIUM SERPL-SCNC: 4.4 MMOL/L
PROT SERPL-MCNC: 8.2 G/DL
RBC # BLD: 3.99 M/UL
RBC # FLD: 13.5 %
SODIUM SERPL-SCNC: 139 MMOL/L
WBC # FLD AUTO: 2.86 K/UL

## 2023-10-16 ENCOUNTER — TRANSCRIPTION ENCOUNTER (OUTPATIENT)
Age: 63
End: 2023-10-16

## 2023-10-17 ENCOUNTER — APPOINTMENT (OUTPATIENT)
Dept: RHEUMATOLOGY | Facility: CLINIC | Age: 63
End: 2023-10-17
Payer: COMMERCIAL

## 2023-10-17 VITALS
HEIGHT: 63 IN | HEART RATE: 64 BPM | OXYGEN SATURATION: 98 % | BODY MASS INDEX: 22.68 KG/M2 | SYSTOLIC BLOOD PRESSURE: 135 MMHG | WEIGHT: 128 LBS | DIASTOLIC BLOOD PRESSURE: 84 MMHG

## 2023-10-17 PROCEDURE — 99214 OFFICE O/P EST MOD 30 MIN: CPT

## 2023-10-17 RX ORDER — TACROLIMUS 1 MG/1
1 CAPSULE ORAL
Qty: 270 | Refills: 1 | Status: DISCONTINUED | COMMUNITY
Start: 2023-04-20 | End: 2023-10-17

## 2023-10-19 ENCOUNTER — APPOINTMENT (OUTPATIENT)
Dept: NEUROLOGY | Facility: CLINIC | Age: 63
End: 2023-10-19
Payer: COMMERCIAL

## 2023-10-19 PROCEDURE — 95885 MUSC TST DONE W/NERV TST LIM: CPT | Mod: 59

## 2023-10-19 PROCEDURE — 95886 MUSC TEST DONE W/N TEST COMP: CPT

## 2023-10-19 PROCEDURE — 95909 NRV CNDJ TST 5-6 STUDIES: CPT

## 2023-11-17 ENCOUNTER — LABORATORY RESULT (OUTPATIENT)
Age: 63
End: 2023-11-17

## 2023-11-27 ENCOUNTER — TRANSCRIPTION ENCOUNTER (OUTPATIENT)
Age: 63
End: 2023-11-27

## 2023-11-29 LAB
ALBUMIN SERPL ELPH-MCNC: 4.5 G/DL
ALP BLD-CCNC: 77 U/L
ALT SERPL-CCNC: 34 U/L
ANION GAP SERPL CALC-SCNC: 13 MMOL/L
AST SERPL-CCNC: 43 U/L
BASOPHILS # BLD AUTO: 0.04 K/UL
BASOPHILS NFR BLD AUTO: 1.4 %
BILIRUB SERPL-MCNC: 0.6 MG/DL
BUN SERPL-MCNC: 10 MG/DL
CALCIUM SERPL-MCNC: 9.6 MG/DL
CHLORIDE SERPL-SCNC: 101 MMOL/L
CHOLEST SERPL-MCNC: 266 MG/DL
CK SERPL-CCNC: 1979 U/L
CO2 SERPL-SCNC: 23 MMOL/L
CREAT SERPL-MCNC: 0.42 MG/DL
CRP SERPL-MCNC: <3 MG/L
EGFR: 110 ML/MIN/1.73M2
EOSINOPHIL # BLD AUTO: 0.08 K/UL
EOSINOPHIL NFR BLD AUTO: 2.8 %
ESTIMATED AVERAGE GLUCOSE: 146 MG/DL
FERRITIN SERPL-MCNC: 275 NG/ML
GLUCOSE SERPL-MCNC: 143 MG/DL
HBA1C MFR BLD HPLC: 6.7 %
HCT VFR BLD CALC: 39.4 %
HDLC SERPL-MCNC: 79 MG/DL
HGB BLD-MCNC: 13.4 G/DL
IMM GRANULOCYTES NFR BLD AUTO: 0.4 %
LDLC SERPL CALC-MCNC: 153 MG/DL
LYMPHOCYTES # BLD AUTO: 1.12 K/UL
LYMPHOCYTES NFR BLD AUTO: 39.3 %
MAN DIFF?: NORMAL
MCHC RBC-ENTMCNC: 31.8 PG
MCHC RBC-ENTMCNC: 34 GM/DL
MCV RBC AUTO: 93.4 FL
MONOCYTES # BLD AUTO: 0.17 K/UL
MONOCYTES NFR BLD AUTO: 6 %
MYOGLOBIN SERPL-MCNC: 264 NG/ML
NEUTROPHILS # BLD AUTO: 1.43 K/UL
NEUTROPHILS NFR BLD AUTO: 50.1 %
NONHDLC SERPL-MCNC: 187 MG/DL
PLATELET # BLD AUTO: NORMAL K/UL
POTASSIUM SERPL-SCNC: 4.4 MMOL/L
PROT SERPL-MCNC: 8.9 G/DL
RBC # BLD: 4.22 M/UL
RBC # FLD: 13.7 %
SODIUM SERPL-SCNC: 137 MMOL/L
TRIGL SERPL-MCNC: 192 MG/DL
WBC # FLD AUTO: 2.85 K/UL

## 2023-12-12 ENCOUNTER — TRANSCRIPTION ENCOUNTER (OUTPATIENT)
Age: 63
End: 2023-12-12

## 2023-12-13 ENCOUNTER — TRANSCRIPTION ENCOUNTER (OUTPATIENT)
Age: 63
End: 2023-12-13

## 2023-12-17 ENCOUNTER — TRANSCRIPTION ENCOUNTER (OUTPATIENT)
Age: 63
End: 2023-12-17

## 2023-12-17 LAB
ALBUMIN SERPL ELPH-MCNC: 4.6 G/DL
ALP BLD-CCNC: 78 U/L
ALT SERPL-CCNC: 32 U/L
ANION GAP SERPL CALC-SCNC: 12 MMOL/L
AST SERPL-CCNC: 37 U/L
BASOPHILS # BLD AUTO: 0.06 K/UL
BASOPHILS NFR BLD AUTO: 1.5 %
BILIRUB SERPL-MCNC: 0.6 MG/DL
BUN SERPL-MCNC: 10 MG/DL
CALCIUM SERPL-MCNC: 9.7 MG/DL
CD16+CD56+ CELLS # BLD: 312 CELLS/UL
CD16+CD56+ CELLS NFR BLD: 24 %
CD19 CELLS NFR BLD: 1 CELLS/UL
CD3 CELLS # BLD: 1083 CELLS/UL
CD3 CELLS NFR BLD: 76 %
CD3+CD4+ CELLS # BLD: 550 CELLS/UL
CD3+CD4+ CELLS NFR BLD: 36 %
CD3+CD4+ CELLS/CD3+CD8+ CLL SPEC: 0.91 RATIO
CD3+CD8+ CELLS # SPEC: 603 CELLS/UL
CD3+CD8+ CELLS NFR BLD: 40 %
CELLS.CD3-CD19+/CELLS IN BLOOD: <1 %
CHLORIDE SERPL-SCNC: 98 MMOL/L
CK SERPL-CCNC: 1327 U/L
CO2 SERPL-SCNC: 22 MMOL/L
CREAT SERPL-MCNC: 0.45 MG/DL
CRP SERPL-MCNC: <3 MG/L
DEPRECATED KAPPA LC FREE/LAMBDA SER: 1.26 RATIO
EGFR: 108 ML/MIN/1.73M2
EOSINOPHIL # BLD AUTO: 0.11 K/UL
EOSINOPHIL NFR BLD AUTO: 2.8 %
ERYTHROCYTE [SEDIMENTATION RATE] IN BLOOD BY WESTERGREN METHOD: 74 MM/HR
GLUCOSE SERPL-MCNC: 154 MG/DL
HCT VFR BLD CALC: 38.7 %
HGB BLD-MCNC: 13.1 G/DL
IGA SER QL IEP: 238 MG/DL
IGG SER QL IEP: 3096 MG/DL
IGM SER QL IEP: 54 MG/DL
IMM GRANULOCYTES NFR BLD AUTO: 0.3 %
KAPPA LC CSF-MCNC: 0.92 MG/DL
KAPPA LC SERPL-MCNC: 1.16 MG/DL
LYMPHOCYTES # BLD AUTO: 1.41 K/UL
LYMPHOCYTES NFR BLD AUTO: 35.5 %
MAN DIFF?: NORMAL
MCHC RBC-ENTMCNC: 31.9 PG
MCHC RBC-ENTMCNC: 33.9 GM/DL
MCV RBC AUTO: 94.2 FL
MONOCYTES # BLD AUTO: 0.22 K/UL
MONOCYTES NFR BLD AUTO: 5.5 %
MYOGLOBIN SERPL-MCNC: 200 NG/ML
NEUTROPHILS # BLD AUTO: 2.16 K/UL
NEUTROPHILS NFR BLD AUTO: 54.4 %
PLATELET # BLD AUTO: 239 K/UL
POTASSIUM SERPL-SCNC: 4.1 MMOL/L
PROT SERPL-MCNC: 9.1 G/DL
RBC # BLD: 4.11 M/UL
RBC # FLD: 13.7 %
SODIUM SERPL-SCNC: 133 MMOL/L
WBC # FLD AUTO: 3.97 K/UL

## 2023-12-18 ENCOUNTER — TRANSCRIPTION ENCOUNTER (OUTPATIENT)
Age: 63
End: 2023-12-18

## 2024-01-16 NOTE — DATA REVIEWED
bilateral
[FreeTextEntry1] : TTE (08/2020): 1. Normal mitral valve. Mild mitral regurgitation. 2. Normal trileaflet aortic valve. Minimal aortic regurgitation. 3. Hyperdynamic left ventricular systolic function.Estimated EF of 75-80%. 4. Normal diastolic function 5. Normal right ventricular size and function.TAPSE 2.11 cm. 6. Normal pericardium with no pericardial effusion. *** No previous Echo exam.\par \par CT Chest, abdomen and pelvis (08/2020): CHEST: LUNGS AND LARGE AIRWAYS: No endobronchial lesions. Clear lungs. PLEURA: No pleural effusion. VESSELS: Within normal limits. HEART: Heart size is normal. No pericardial effusion. MEDIASTINUM AND POLLY: No lymphadenopathy. CHEST WALL AND LOWER NECK: Within normal limits. ABDOMEN AND PELVIS: LIVER: Hepatic cyst. Subcentimeter liver lesion too small to characterize. BILE DUCTS: Normal caliber. GALLBLADDER: Within normal limits. SPLEEN: Within normal limits. PANCREAS: Within normal limits. ADRENALS: Within normal limits. KIDNEYS/URETERS: Nonobstructing left intrarenal calculus measuring 3 mm. No hydronephrosis. BLADDER: Within normal limits. REPRODUCTIVE ORGANS: Uterus and adnexa within normal limits. BOWEL: No bowel obstruction. Nonspecific thickening of the distal ascending and proximal to mid transverse colon, which may be on the basis of underdistention rather than colitis. Appendix is normal. PERITONEUM: No ascites. VESSELS: Mild calcification of the aorta. RETROPERITONEUM/LYMPH NODES: No lymphadenopathy. ABDOMINAL WALL: Within normal limits. BONES: Mild degenerative changes of the spine. IMPRESSION: No evidence of suspicious mass or lymphadenopathy in the chest, abdomen, or pelvis. \par \par MRI thighs (08/2020): OSSEOUS STRUCTURES  Fractures: None. HIP JOINTS  Articular surfaces: preserved given the large field of view imaging.  Effusion/Synovitis: None. MYOTENDINOUS STRUCTURES \par Relatively symmetric muscle edema is present involving the bilateral pelvic musculature including the iliopsoas, iliacus, obturator internus, adductors, and gluteus medius. Proximal rectus femoris and faint hamstring edema is also noted bilaterally. Changes are most pronounced within the bilateral iliopsoas and proximal rectus femoris muscles. There is associated postcontrast enhancement. No associated muscle atrophy is seen. NEUROVASCULAR STRUCTURES Appear preserved. OTHER SOFT TISSUES unremarkable. IMPRESSION: 1. Symmetric nonspecific myositis of the bilateral pelvic and hamstring muscles with greatest involvement of the iliopsoas and proximal rectus femoris muscles. \par \par US thyroid (08/2020): Right Lobe: 4.3 x 1.5 x 0.9 cm. Heterogeneous texture. Spongiform nodule interpolar region measures 1.1 x 0.8 x 0.6 m. Left Lobe: 3.9 x 1.5 x 0.9 cm. Heterogeneous texture. Several subcentimeter colloid nodules. Isthmus: 1 mm. No nodule Cervical Lymph Nodes: Lymph node inferior to left thyroid lobe measures 1.4 x 0.5 cm. IMPRESSION: Bilateral small thyroid nodules of low sonographic suspicion. \par

## 2024-01-25 ENCOUNTER — APPOINTMENT (OUTPATIENT)
Dept: RHEUMATOLOGY | Facility: CLINIC | Age: 64
End: 2024-01-25
Payer: COMMERCIAL

## 2024-01-25 VITALS
SYSTOLIC BLOOD PRESSURE: 147 MMHG | OXYGEN SATURATION: 98 % | DIASTOLIC BLOOD PRESSURE: 80 MMHG | HEIGHT: 63 IN | TEMPERATURE: 98.3 F | WEIGHT: 128 LBS | HEART RATE: 81 BPM | RESPIRATION RATE: 16 BRPM | BODY MASS INDEX: 22.68 KG/M2
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
## 2024-01-25 DIAGNOSIS — Z78.9 OTHER SPECIFIED HEALTH STATUS: ICD-10-CM

## 2024-01-25 PROCEDURE — 99214 OFFICE O/P EST MOD 30 MIN: CPT

## 2024-01-25 RX ORDER — PREDNISONE 5 MG/1
5 TABLET ORAL
Qty: 30 | Refills: 0 | Status: DISCONTINUED | COMMUNITY
Start: 2023-04-13 | End: 2024-01-25

## 2024-01-26 ENCOUNTER — APPOINTMENT (OUTPATIENT)
Dept: RADIOLOGY | Facility: CLINIC | Age: 64
End: 2024-01-26
Payer: COMMERCIAL

## 2024-01-26 PROCEDURE — 73522 X-RAY EXAM HIPS BI 3-4 VIEWS: CPT

## 2024-01-26 PROCEDURE — 73552 X-RAY EXAM OF FEMUR 2/>: CPT | Mod: 50

## 2024-01-26 PROCEDURE — 77080 DXA BONE DENSITY AXIAL: CPT

## 2024-01-29 ENCOUNTER — TRANSCRIPTION ENCOUNTER (OUTPATIENT)
Age: 64
End: 2024-01-29

## 2024-01-30 ENCOUNTER — TRANSCRIPTION ENCOUNTER (OUTPATIENT)
Age: 64
End: 2024-01-30

## 2024-01-31 ENCOUNTER — TRANSCRIPTION ENCOUNTER (OUTPATIENT)
Age: 64
End: 2024-01-31

## 2024-01-31 LAB
ALBUMIN SERPL ELPH-MCNC: 4.7 G/DL
ALP BLD-CCNC: 77 U/L
ALT SERPL-CCNC: 34 U/L
ANION GAP SERPL CALC-SCNC: 13 MMOL/L
AST SERPL-CCNC: 39 U/L
BASOPHILS # BLD AUTO: 0.05 K/UL
BASOPHILS NFR BLD AUTO: 1.5 %
BILIRUB SERPL-MCNC: 0.6 MG/DL
BUN SERPL-MCNC: 10 MG/DL
CALCIUM SERPL-MCNC: 9.4 MG/DL
CHLORIDE SERPL-SCNC: 99 MMOL/L
CK SERPL-CCNC: 1356 U/L
CO2 SERPL-SCNC: 23 MMOL/L
CREAT SERPL-MCNC: 0.42 MG/DL
CRP SERPL-MCNC: <3 MG/L
EGFR: 110 ML/MIN/1.73M2
EOSINOPHIL # BLD AUTO: 0.07 K/UL
EOSINOPHIL NFR BLD AUTO: 2.1 %
ERYTHROCYTE [SEDIMENTATION RATE] IN BLOOD BY WESTERGREN METHOD: 58 MM/HR
GLUCOSE SERPL-MCNC: 150 MG/DL
HCT VFR BLD CALC: 41.4 %
HGB BLD-MCNC: 13.5 G/DL
IMM GRANULOCYTES NFR BLD AUTO: 0.3 %
LYMPHOCYTES # BLD AUTO: 1.22 K/UL
LYMPHOCYTES NFR BLD AUTO: 36.5 %
MAN DIFF?: NORMAL
MCHC RBC-ENTMCNC: 31.6 PG
MCHC RBC-ENTMCNC: 32.6 GM/DL
MCV RBC AUTO: 97 FL
MONOCYTES # BLD AUTO: 0.18 K/UL
MONOCYTES NFR BLD AUTO: 5.4 %
MYOGLOBIN SERPL-MCNC: 146 NG/ML
NEUTROPHILS # BLD AUTO: 1.81 K/UL
NEUTROPHILS NFR BLD AUTO: 54.2 %
PLATELET # BLD AUTO: 256 K/UL
POTASSIUM SERPL-SCNC: 4.3 MMOL/L
PROT SERPL-MCNC: 8.6 G/DL
RBC # BLD: 4.27 M/UL
RBC # FLD: 13.6 %
SODIUM SERPL-SCNC: 135 MMOL/L
WBC # FLD AUTO: 3.34 K/UL

## 2024-02-13 ENCOUNTER — APPOINTMENT (OUTPATIENT)
Dept: RHEUMATOLOGY | Facility: CLINIC | Age: 64
End: 2024-02-13
Payer: COMMERCIAL

## 2024-02-13 PROCEDURE — 99442: CPT

## 2024-02-13 RX ORDER — CETIRIZINE HYDROCHLORIDE 10 MG/1
10 TABLET, FILM COATED ORAL
Refills: 0 | Status: ACTIVE | OUTPATIENT
Start: 2024-02-13 | End: 1900-01-01

## 2024-02-13 RX ORDER — METHYLPREDNISOLONE SODIUM SUCCINATE 125 MG/2ML
125 INJECTION, POWDER, FOR SOLUTION INTRAMUSCULAR; INTRAVENOUS
Refills: 0 | Status: ACTIVE | OUTPATIENT
Start: 2024-02-13 | End: 1900-01-01

## 2024-02-13 RX ORDER — RITUXIMAB 10 MG/ML
500 INJECTION, SOLUTION INTRAVENOUS
Refills: 0 | Status: ACTIVE | OUTPATIENT
Start: 2024-02-13 | End: 1900-01-01

## 2024-02-13 RX ORDER — ACETAMINOPHEN 325 MG/1
325 TABLET, FILM COATED ORAL
Refills: 0 | Status: ACTIVE | OUTPATIENT
Start: 2024-02-13 | End: 1900-01-01

## 2024-02-13 RX ADMIN — METHYLPREDNISOLONE SODIUM SUCCINATE 0 MG: 125 INJECTION, POWDER, FOR SOLUTION INTRAMUSCULAR; INTRAVENOUS at 00:00

## 2024-02-13 RX ADMIN — CETIRIZINE HYDROCHLORIDE 0 MG: 10 TABLET, FILM COATED ORAL at 00:00

## 2024-02-13 RX ADMIN — ACETAMINOPHEN 0 MG: 325 TABLET, FILM COATED ORAL at 00:00

## 2024-02-13 RX ADMIN — RITUXIMAB 0 MG/50ML: 10 INJECTION, SOLUTION INTRAVENOUS at 00:00

## 2024-02-13 NOTE — ASSESSMENT
[FreeTextEntry1] : 63 year old female with recently diagnosed statin induced myositis here for follow up  1. 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 Elevated HMGCoA Reductase antibody > 200. 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 Started on high dose Prednisone with improving CPK and LFTs, came off steroids and was on IVIg home infusion bimonthly. Had an increase in CPK concerning for failure of monotherapy with IVIG. Was started on steroid taper with good response but with tapering CK started to get elevated again. Started MMF in Apr 2022 - while there has been good response with decrease in CPK, also noted to have leukopenia. She was thus transitioned to Tacrolimus but discontinued it shortly after. She was briefly taking chinese herbal medication under the guidance of a chinese rheumatologist. Now takes it on and off due to GI discomfort with this medication. Continuing IVIg infusions monthly. Since April 2023 had a flare of her myositis for which she was restarted on Prednisone which has now been tapered off with minimal improvement in CPK levels. Was also briefly on Tacrolimus with no improvement. She is s/p Rituxan in Sept 2023 and since her infusion there has been a decline in the CPK on recent labs. Will continue to monitor. Second round of infusion to be scheduled for March-April 2024.   2. hx of Leukopenia: drug induced? Stable  3. Bone health: Ca+D as needed. DEXA from Jan 2024 reviewed - consistent with osteopenia. BPP not indicated at this time.   Follow up in 2 months

## 2024-02-13 NOTE — DATA REVIEWED
[FreeTextEntry1] : DEXA (01/2024): Spine: -1.8, osteopenia. Femoral neck: -1.4 , osteopenia. Total hip: -1.3 , osteopenia. IMPRESSION: Osteopenia. FRACTURE RISK: Using the FRAX 10 year fracture risk calculator the patient's ten-year risk of any fracture is 7.0% and the patient's risk of  hip fracture  is  0.7%. Additional risk factors used in the FRAX calculation: Glucocorticoids TREATMENT RECOMMENDATIONS:  Based on NOF treatment guidelines medical therapy is not recommended at this time.  TTE (08/2020): 1. Normal mitral valve. Mild mitral regurgitation. 2. Normal trileaflet aortic valve. Minimal aortic regurgitation. 3. Hyperdynamic left ventricular systolic function.Estimated EF of 75-80%. 4. Normal diastolic function 5. Normal right ventricular size and function.TAPSE 2.11 cm. 6. Normal pericardium with no pericardial effusion. *** No previous Echo exam.  CT Chest, abdomen and pelvis (08/2020): CHEST: LUNGS AND LARGE AIRWAYS: No endobronchial lesions. Clear lungs. PLEURA: No pleural effusion. VESSELS: Within normal limits. HEART: Heart size is normal. No pericardial effusion. MEDIASTINUM AND POLLY: No lymphadenopathy. CHEST WALL AND LOWER NECK: Within normal limits. ABDOMEN AND PELVIS: LIVER: Hepatic cyst. Subcentimeter liver lesion too small to characterize. BILE DUCTS: Normal caliber. GALLBLADDER: Within normal limits. SPLEEN: Within normal limits. PANCREAS: Within normal limits. ADRENALS: Within normal limits. KIDNEYS/URETERS: Nonobstructing left intrarenal calculus measuring 3 mm. No hydronephrosis. BLADDER: Within normal limits. REPRODUCTIVE ORGANS: Uterus and adnexa within normal limits. BOWEL: No bowel obstruction. Nonspecific thickening of the distal ascending and proximal to mid transverse colon, which may be on the basis of underdistention rather than colitis. Appendix is normal. PERITONEUM: No ascites. VESSELS: Mild calcification of the aorta. RETROPERITONEUM/LYMPH NODES: No lymphadenopathy. ABDOMINAL WALL: Within normal limits. BONES: Mild degenerative changes of the spine. IMPRESSION: No evidence of suspicious mass or lymphadenopathy in the chest, abdomen, or pelvis.   MRI thighs (08/2020): OSSEOUS STRUCTURES  Fractures: None. HIP JOINTS  Articular surfaces: preserved given the large field of view imaging.  Effusion/Synovitis: None. MYOTENDINOUS STRUCTURES  Relatively symmetric muscle edema is present involving the bilateral pelvic musculature including the iliopsoas, iliacus, obturator internus, adductors, and gluteus medius. Proximal rectus femoris and faint hamstring edema is also noted bilaterally. Changes are most pronounced within the bilateral iliopsoas and proximal rectus femoris muscles. There is associated postcontrast enhancement. No associated muscle atrophy is seen. NEUROVASCULAR STRUCTURES Appear preserved. OTHER SOFT TISSUES unremarkable. IMPRESSION: 1. Symmetric nonspecific myositis of the bilateral pelvic and hamstring muscles with greatest involvement of the iliopsoas and proximal rectus femoris muscles.   US thyroid (08/2020): Right Lobe: 4.3 x 1.5 x 0.9 cm. Heterogeneous texture. Spongiform nodule interpolar region measures 1.1 x 0.8 x 0.6 m. Left Lobe: 3.9 x 1.5 x 0.9 cm. Heterogeneous texture. Several subcentimeter colloid nodules. Isthmus: 1 mm. No nodule Cervical Lymph Nodes: Lymph node inferior to left thyroid lobe measures 1.4 x 0.5 cm. IMPRESSION: Bilateral small thyroid nodules of low sonographic suspicion.

## 2024-02-13 NOTE — HISTORY OF PRESENT ILLNESS
[FreeTextEntry1] : Still feels quite weak in the legs. Yet to start PT. No new complaints otherwise. IVIg due on saturday.  [Anorexia] : no anorexia [Weight Loss] : no weight loss [Malaise] : no malaise [Fever] : no fever [Chills] : no chills [Fatigue] : no fatigue [Depression] : no depression [Malar Facial Rash] : no malar facial rash [Skin Lesions] : no lesions [Skin Nodules] : no skin nodules [Oral Ulcers] : no oral ulcers [Cough] : no cough [Dry Mouth] : no dry mouth [Dysphonia] : no dysphonia [Dysphagia] : no dysphagia [Shortness of Breath] : no shortness of breath [Chest Pain] : no chest pain [Arthralgias] : no arthralgias [Joint Swelling] : no joint swelling [Joint Warmth] : no joint warmth [Joint Deformity] : no joint deformity [Decreased ROM] : no decreased range of motion [Morning Stiffness] : no morning stiffness [Falls] : no falls [Difficulty Standing] : no difficulty standing [Difficulty Walking] : no difficulty walking [Dyspnea] : no dyspnea [Myalgias] : no myalgias [Muscle Spasms] : no muscle spasms [Muscle Cramping] : no muscle cramping [Visual Changes] : no visual changes [Eye Pain] : no eye pain [Eye Redness] : no eye redness [Dry Eyes] : no dry eyes [TextBox_2] : August 2020 [TextBox_19] : High dose Prednisone [TextBox_24] :  1. Symmetric nonspecific myositis of the bilateral pelvic and hamstring muscles with greatest involvement of the iliopsoas and proximal rectus femoris muscles.  [TextBox_27] : necrotizing myositis [TextBox_43] : Aug 2020 - clear lungs

## 2024-02-21 ENCOUNTER — TRANSCRIPTION ENCOUNTER (OUTPATIENT)
Age: 64
End: 2024-02-21

## 2024-02-22 ENCOUNTER — TRANSCRIPTION ENCOUNTER (OUTPATIENT)
Age: 64
End: 2024-02-22

## 2024-02-22 LAB
25(OH)D3 SERPL-MCNC: 21.5 NG/ML
ALBUMIN SERPL ELPH-MCNC: 4.4 G/DL
ALP BLD-CCNC: 80 U/L
ALT SERPL-CCNC: 29 U/L
ANION GAP SERPL CALC-SCNC: 12 MMOL/L
AST SERPL-CCNC: 31 U/L
BASOPHILS # BLD AUTO: 0.05 K/UL
BASOPHILS NFR BLD AUTO: 1.2 %
BILIRUB SERPL-MCNC: 0.5 MG/DL
BUN SERPL-MCNC: 13 MG/DL
CALCIUM SERPL-MCNC: 9.8 MG/DL
CD16+CD56+ CELLS # BLD: 334 CELLS/UL
CD16+CD56+ CELLS NFR BLD: 23 %
CD19 CELLS NFR BLD: 7 CELLS/UL
CD3 CELLS # BLD: 1070 CELLS/UL
CD3 CELLS NFR BLD: 75 %
CD3+CD4+ CELLS # BLD: 503 CELLS/UL
CD3+CD4+ CELLS NFR BLD: 36 %
CD3+CD4+ CELLS/CD3+CD8+ CLL SPEC: 0.94 RATIO
CD3+CD8+ CELLS # SPEC: 537 CELLS/UL
CD3+CD8+ CELLS NFR BLD: 38 %
CELLS.CD3-CD19+/CELLS IN BLOOD: 1 %
CHLORIDE SERPL-SCNC: 102 MMOL/L
CHOLEST SERPL-MCNC: 282 MG/DL
CK SERPL-CCNC: 1480 U/L
CO2 SERPL-SCNC: 24 MMOL/L
CREAT SERPL-MCNC: 0.4 MG/DL
CRP SERPL-MCNC: <3 MG/L
DEPRECATED KAPPA LC FREE/LAMBDA SER: 1.01 RATIO
EGFR: 111 ML/MIN/1.73M2
EOSINOPHIL # BLD AUTO: 0.09 K/UL
EOSINOPHIL NFR BLD AUTO: 2.2 %
ERYTHROCYTE [SEDIMENTATION RATE] IN BLOOD BY WESTERGREN METHOD: 54 MM/HR
ESTIMATED AVERAGE GLUCOSE: 146 MG/DL
GLUCOSE SERPL-MCNC: 164 MG/DL
HAV IGM SER QL: NONREACTIVE
HBA1C MFR BLD HPLC: 6.7 %
HBV CORE IGG+IGM SER QL: REACTIVE
HBV CORE IGM SER QL: NONREACTIVE
HBV CORE IGM SER QL: NONREACTIVE
HBV SURFACE AG SER QL: NONREACTIVE
HCT VFR BLD CALC: 36.4 %
HCV AB SER QL: NONREACTIVE
HCV S/CO RATIO: 0.15 S/CO
HDLC SERPL-MCNC: 72 MG/DL
HEPB DNA PCR INT: NOT DETECTED
HEPB DNA PCR LOG: NOT DETECTED LOGIU/ML
HGB BLD-MCNC: 12.8 G/DL
IGA SER QL IEP: 272 MG/DL
IGG SER QL IEP: 2562 MG/DL
IGM SER QL IEP: 50 MG/DL
IMM GRANULOCYTES NFR BLD AUTO: 0.2 %
KAPPA LC CSF-MCNC: 1.09 MG/DL
KAPPA LC SERPL-MCNC: 1.1 MG/DL
LDLC SERPL CALC-MCNC: 168 MG/DL
LYMPHOCYTES # BLD AUTO: 1.46 K/UL
LYMPHOCYTES NFR BLD AUTO: 35.8 %
M TB IFN-G BLD-IMP: NEGATIVE
MAN DIFF?: NORMAL
MCHC RBC-ENTMCNC: 31.8 PG
MCHC RBC-ENTMCNC: 35.2 GM/DL
MCV RBC AUTO: 90.5 FL
MONOCYTES # BLD AUTO: 0.21 K/UL
MONOCYTES NFR BLD AUTO: 5.1 %
MYOGLOBIN SERPL-MCNC: 184 NG/ML
NEUTROPHILS # BLD AUTO: 2.26 K/UL
NEUTROPHILS NFR BLD AUTO: 55.5 %
NONHDLC SERPL-MCNC: 210 MG/DL
PLATELET # BLD AUTO: 248 K/UL
POTASSIUM SERPL-SCNC: 4.2 MMOL/L
PROT SERPL-MCNC: 8.4 G/DL
QUANTIFERON TB PLUS MITOGEN MINUS NIL: 9.75 IU/ML
QUANTIFERON TB PLUS NIL: 0.02 IU/ML
QUANTIFERON TB PLUS TB1 MINUS NIL: 0 IU/ML
QUANTIFERON TB PLUS TB2 MINUS NIL: 0 IU/ML
RBC # BLD: 4.02 M/UL
RBC # FLD: 13.5 %
SODIUM SERPL-SCNC: 138 MMOL/L
TRIGL SERPL-MCNC: 228 MG/DL
TSH SERPL-ACNC: 1.83 UIU/ML
WBC # FLD AUTO: 4.08 K/UL

## 2024-02-23 ENCOUNTER — TRANSCRIPTION ENCOUNTER (OUTPATIENT)
Age: 64
End: 2024-02-23

## 2024-02-26 RX ORDER — CETIRIZINE HYDROCHLORIDE 10 MG/1
10 TABLET, FILM COATED ORAL
Refills: 0 | Status: COMPLETED | OUTPATIENT
Start: 2023-07-18 | End: 2024-02-26

## 2024-02-26 RX ORDER — RITUXIMAB 10 MG/ML
500 INJECTION, SOLUTION INTRAVENOUS
Refills: 0 | Status: COMPLETED | OUTPATIENT
Start: 2023-07-18 | End: 2024-02-26

## 2024-02-26 RX ORDER — ACETAMINOPHEN 325 MG/1
325 TABLET ORAL
Refills: 0 | Status: COMPLETED | OUTPATIENT
Start: 2023-07-18 | End: 2024-02-26

## 2024-02-26 RX ORDER — METHYLPREDNISOLONE SODIUM SUCCINATE 125 MG/2ML
125 INJECTION, POWDER, FOR SOLUTION INTRAMUSCULAR; INTRAVENOUS
Refills: 0 | Status: COMPLETED | OUTPATIENT
Start: 2023-07-18 | End: 2024-02-26

## 2024-03-04 ENCOUNTER — TRANSCRIPTION ENCOUNTER (OUTPATIENT)
Age: 64
End: 2024-03-04

## 2024-03-25 ENCOUNTER — TRANSCRIPTION ENCOUNTER (OUTPATIENT)
Age: 64
End: 2024-03-25

## 2024-03-26 ENCOUNTER — APPOINTMENT (OUTPATIENT)
Dept: RHEUMATOLOGY | Facility: CLINIC | Age: 64
End: 2024-03-26
Payer: COMMERCIAL

## 2024-03-26 VITALS
SYSTOLIC BLOOD PRESSURE: 129 MMHG | RESPIRATION RATE: 16 BRPM | HEART RATE: 71 BPM | DIASTOLIC BLOOD PRESSURE: 80 MMHG | OXYGEN SATURATION: 98 %

## 2024-03-26 VITALS
RESPIRATION RATE: 16 BRPM | TEMPERATURE: 98 F | DIASTOLIC BLOOD PRESSURE: 74 MMHG | HEART RATE: 77 BPM | OXYGEN SATURATION: 96 % | SYSTOLIC BLOOD PRESSURE: 153 MMHG

## 2024-03-26 VITALS
RESPIRATION RATE: 16 BRPM | HEART RATE: 77 BPM | DIASTOLIC BLOOD PRESSURE: 70 MMHG | OXYGEN SATURATION: 96 % | SYSTOLIC BLOOD PRESSURE: 113 MMHG | TEMPERATURE: 98 F

## 2024-03-26 PROCEDURE — 96413 CHEMO IV INFUSION 1 HR: CPT

## 2024-03-26 PROCEDURE — 96415 CHEMO IV INFUSION ADDL HR: CPT

## 2024-03-26 PROCEDURE — 96375 TX/PRO/DX INJ NEW DRUG ADDON: CPT | Mod: 59

## 2024-03-26 PROCEDURE — 96374 THER/PROPH/DIAG INJ IV PUSH: CPT | Mod: 59

## 2024-03-26 RX ORDER — RITUXIMAB 10 MG/ML
500 INJECTION, SOLUTION INTRAVENOUS
Qty: 0 | Refills: 0 | Status: COMPLETED
Start: 2024-02-13

## 2024-03-26 RX ORDER — ACETAMINOPHEN 325 MG/1
325 TABLET, FILM COATED ORAL
Qty: 0 | Refills: 0 | Status: COMPLETED
Start: 2024-02-13

## 2024-03-26 RX ORDER — METHYLPREDNISOLONE SODIUM SUCCINATE 125 MG/2ML
125 INJECTION, POWDER, FOR SOLUTION INTRAMUSCULAR; INTRAVENOUS
Qty: 0 | Refills: 0 | Status: COMPLETED
Start: 2024-02-13

## 2024-03-26 RX ORDER — CETIRIZINE HYDROCHLORIDE 10 MG/1
10 TABLET, FILM COATED ORAL
Qty: 0 | Refills: 0 | Status: COMPLETED
Start: 2024-02-13

## 2024-03-26 NOTE — HISTORY OF PRESENT ILLNESS
[N/A] : N/A [Denies] : Denies [No] : No [Yes] : Yes [Declined] : Declined [Right upper extremity] : Right upper extremity [24g] : 24g [Start Time: ___] : Start Time: [unfilled] [End Time: ___] : Medication End Time: [unfilled] [Medication Name: ___] : Medication Name: [unfilled] [Total Amount Administered: ___] : Total Amount Administered: [unfilled] [IV discontinued. Intact. No signs or symptoms of IV complications noted. Time: ___] : IV discontinued. Intact. No signs or symptoms of IV complications noted. Time: [unfilled] [Patient left unit in no acute distress] : Patient left unit in no acute distress [Patient  instructed to seek medical attention with signs and symptoms of adverse effects] : Patient  instructed to seek medical attention with signs and symptoms of adverse effects [Medications administered as ordered and tolerated well.] : Medications administered as ordered and tolerated well. [Outside pharmacy] : Outside pharmacy [de-identified] : 9/2023 [de-identified] : back of forearm  [de-identified] : Patient presents for 1:2 Rituxan infusion, doing well overall. Patient last infusion 9/2023 and tolerated infusion well. Patient denies any recent infection, antibiotic use or hospitalizations. Patient denies any pain today. Patient reports weakness to b/l legs. No other symptoms or concerns noted at this time. Patient pre-medicated, infusion titrated, VS cycle and stable and infusion toleraed well.

## 2024-03-28 LAB
ALBUMIN SERPL ELPH-MCNC: 4.3 G/DL
ALP BLD-CCNC: 95 U/L
ALT SERPL-CCNC: 35 U/L
ANION GAP SERPL CALC-SCNC: 14 MMOL/L
APPEARANCE: CLEAR
AST SERPL-CCNC: 37 U/L
BASOPHILS # BLD AUTO: 0.05 K/UL
BASOPHILS NFR BLD AUTO: 0.8 %
BILIRUB SERPL-MCNC: 0.6 MG/DL
BILIRUBIN URINE: NEGATIVE
BLOOD URINE: NEGATIVE
BUN SERPL-MCNC: 11 MG/DL
CALCIUM SERPL-MCNC: 9.3 MG/DL
CHLORIDE SERPL-SCNC: 99 MMOL/L
CK SERPL-CCNC: 1773 U/L
CO2 SERPL-SCNC: 22 MMOL/L
COLOR: YELLOW
CREAT SERPL-MCNC: 0.47 MG/DL
CREAT SPEC-SCNC: 36 MG/DL
CREAT/PROT UR: 0.3 RATIO
CRP SERPL-MCNC: 6 MG/L
DEPRECATED KAPPA LC FREE/LAMBDA SER: 0.94 RATIO
EGFR: 107 ML/MIN/1.73M2
EOSINOPHIL # BLD AUTO: 0.07 K/UL
EOSINOPHIL NFR BLD AUTO: 1.2 %
ERYTHROCYTE [SEDIMENTATION RATE] IN BLOOD BY WESTERGREN METHOD: 74 MM/HR
FERRITIN SERPL-MCNC: 340 NG/ML
GLUCOSE QUALITATIVE U: NEGATIVE MG/DL
GLUCOSE SERPL-MCNC: 183 MG/DL
HCT VFR BLD CALC: 39.5 %
HGB BLD-MCNC: 13.2 G/DL
IGA SER QL IEP: 251 MG/DL
IGG SER QL IEP: 2879 MG/DL
IGM SER QL IEP: 48 MG/DL
IMM GRANULOCYTES NFR BLD AUTO: 0.2 %
KAPPA LC CSF-MCNC: 1.19 MG/DL
KAPPA LC SERPL-MCNC: 1.12 MG/DL
KETONES URINE: NEGATIVE MG/DL
LEUKOCYTE ESTERASE URINE: NEGATIVE
LYMPHOCYTES # BLD AUTO: 1.28 K/UL
LYMPHOCYTES NFR BLD AUTO: 21.2 %
MAN DIFF?: NORMAL
MCHC RBC-ENTMCNC: 31.6 PG
MCHC RBC-ENTMCNC: 33.4 GM/DL
MCV RBC AUTO: 94.5 FL
MONOCYTES # BLD AUTO: 0.21 K/UL
MONOCYTES NFR BLD AUTO: 3.5 %
MYOGLOBIN SERPL-MCNC: 223 NG/ML
NEUTROPHILS # BLD AUTO: 4.42 K/UL
NEUTROPHILS NFR BLD AUTO: 73.1 %
NITRITE URINE: NEGATIVE
PH URINE: 6
PLATELET # BLD AUTO: 295 K/UL
POTASSIUM SERPL-SCNC: 4.5 MMOL/L
PROT SERPL-MCNC: 9 G/DL
PROT UR-MCNC: 10 MG/DL
PROTEIN URINE: NEGATIVE MG/DL
RBC # BLD: 4.18 M/UL
RBC # FLD: 13.8 %
SODIUM SERPL-SCNC: 136 MMOL/L
SPECIFIC GRAVITY URINE: 1.01
UROBILINOGEN URINE: 0.2 MG/DL
WBC # FLD AUTO: 6.04 K/UL

## 2024-04-05 DIAGNOSIS — Z00.00 ENCOUNTER FOR GENERAL ADULT MEDICAL EXAMINATION W/OUT ABNORMAL FINDINGS: ICD-10-CM

## 2024-04-10 ENCOUNTER — APPOINTMENT (OUTPATIENT)
Dept: RHEUMATOLOGY | Facility: CLINIC | Age: 64
End: 2024-04-10
Payer: COMMERCIAL

## 2024-04-10 VITALS
SYSTOLIC BLOOD PRESSURE: 113 MMHG | RESPIRATION RATE: 16 BRPM | HEART RATE: 65 BPM | TEMPERATURE: 98 F | OXYGEN SATURATION: 98 % | DIASTOLIC BLOOD PRESSURE: 73 MMHG

## 2024-04-10 VITALS
RESPIRATION RATE: 16 BRPM | SYSTOLIC BLOOD PRESSURE: 129 MMHG | TEMPERATURE: 98.1 F | DIASTOLIC BLOOD PRESSURE: 79 MMHG | HEART RATE: 69 BPM | OXYGEN SATURATION: 99 %

## 2024-04-10 VITALS
TEMPERATURE: 98.2 F | HEART RATE: 69 BPM | DIASTOLIC BLOOD PRESSURE: 82 MMHG | RESPIRATION RATE: 16 BRPM | SYSTOLIC BLOOD PRESSURE: 119 MMHG | OXYGEN SATURATION: 98 %

## 2024-04-10 VITALS
RESPIRATION RATE: 16 BRPM | OXYGEN SATURATION: 97 % | TEMPERATURE: 98.2 F | HEART RATE: 77 BPM | DIASTOLIC BLOOD PRESSURE: 65 MMHG | SYSTOLIC BLOOD PRESSURE: 98 MMHG

## 2024-04-10 PROCEDURE — 96374 THER/PROPH/DIAG INJ IV PUSH: CPT | Mod: 59

## 2024-04-10 PROCEDURE — 96415 CHEMO IV INFUSION ADDL HR: CPT

## 2024-04-10 PROCEDURE — 96413 CHEMO IV INFUSION 1 HR: CPT

## 2024-04-10 RX ORDER — METHYLPREDNISOLONE SODIUM SUCCINATE 125 MG/2ML
125 INJECTION, POWDER, FOR SOLUTION INTRAMUSCULAR; INTRAVENOUS
Qty: 0 | Refills: 0 | Status: COMPLETED
Start: 2024-02-13

## 2024-04-10 RX ORDER — RITUXIMAB 10 MG/ML
500 INJECTION, SOLUTION INTRAVENOUS
Qty: 0 | Refills: 0 | Status: COMPLETED
Start: 2024-02-13

## 2024-04-10 RX ORDER — ACETAMINOPHEN 325 MG/1
325 TABLET, FILM COATED ORAL
Qty: 0 | Refills: 0 | Status: COMPLETED
Start: 2024-02-13

## 2024-04-10 RX ORDER — CETIRIZINE HYDROCHLORIDE 10 MG/1
10 TABLET, FILM COATED ORAL
Qty: 0 | Refills: 0 | Status: COMPLETED
Start: 2024-02-13

## 2024-04-10 NOTE — HISTORY OF PRESENT ILLNESS
[N/A] : N/A [Denies] : Denies [Yes] : Yes [Declined] : Declined [Informed consent documented in EHR.] : Informed consent documented in EHR. [de-identified] : generalized muscle weakness, [de-identified] : week 2 [24g] : 24g [Start Time: ___] : Medication Start Time: [unfilled] [End Time: ___] : Medication End Time: [unfilled] [Medication Name: ___] : Medication Name: [unfilled] [Total Amount Administered: ___] : Total Amount Administered: [unfilled] [IV discontinued. Intact. No signs or symptoms of IV complications noted. Time: ___] : IV discontinued. Intact. No signs or symptoms of IV complications noted. Time: [unfilled] [Patient  instructed to seek medical attention with signs and symptoms of adverse effects] : Patient  instructed to seek medical attention with signs and symptoms of adverse effects [Patient left unit in no acute distress] : Patient left unit in no acute distress [Medications administered as ordered and tolerated well.] : Medications administered as ordered and tolerated well. [Outside pharmacy] : Outside pharmacy [de-identified] : right arm cephalic vein [de-identified] : no labs [de-identified] : Patient presents for scheduled Rituxan infusion, week 2, ambulatory  in [facility], accompanied by spouse. Patient reports no AE's after her last treatment. Today, patient reports generalized muscle weakness and intermittent SOB on exertion. Denies falls and no pain. Patient currently on home IVIG treatment twice a month. No other symptoms or concerns verbalized today.  Patient pre-medicated as prescribed, infusion titrated as per protocol and tolerated well. VS cycle and stable. Discharged instructions provided and patient left unit ambulating in NAD, accompanied by spouse.

## 2024-04-30 ENCOUNTER — APPOINTMENT (OUTPATIENT)
Dept: RHEUMATOLOGY | Facility: CLINIC | Age: 64
End: 2024-04-30
Payer: COMMERCIAL

## 2024-04-30 VITALS
OXYGEN SATURATION: 98 % | SYSTOLIC BLOOD PRESSURE: 144 MMHG | DIASTOLIC BLOOD PRESSURE: 79 MMHG | HEART RATE: 70 BPM | BODY MASS INDEX: 23.74 KG/M2 | HEIGHT: 63 IN | WEIGHT: 134 LBS

## 2024-04-30 DIAGNOSIS — M60.80 OTHER MYOSITIS, UNSPECIFIED SITE: ICD-10-CM

## 2024-04-30 PROCEDURE — 99214 OFFICE O/P EST MOD 30 MIN: CPT | Mod: GC

## 2024-04-30 PROCEDURE — G2211 COMPLEX E/M VISIT ADD ON: CPT

## 2024-05-09 ENCOUNTER — TRANSCRIPTION ENCOUNTER (OUTPATIENT)
Age: 64
End: 2024-05-09

## 2024-05-09 NOTE — REVIEW OF SYSTEMS
[As Noted in HPI] : as noted in HPI [Limb Weakness] : limb weakness [Muscle Weakness] : muscle weakness [Feelings Of Weakness] : feelings of weakness [Negative] : Psychiatric [de-identified] : +livedo rash on b/l arms and thighs

## 2024-05-09 NOTE — PHYSICAL EXAM
Mercedes Flap Text: The defect edges were debeveled with a #15 scalpel blade.  Given the location of the defect, shape of the defect and the proximity to free margins a Mercedes flap was deemed most appropriate.  Using a sterile surgical marker, an appropriate advancement flap was drawn incorporating the defect and placing the expected incisions within the relaxed skin tension lines where possible. The area thus outlined was incised deep to adipose tissue with a #15 scalpel blade.  The skin margins were undermined to an appropriate distance in all directions utilizing iris scissors. [General Appearance - Alert] : alert [General Appearance - In No Acute Distress] : in no acute distress [Extraocular Movements] : extraocular movements were intact [Oropharynx] : the oropharynx was normal [Neck Appearance] : the appearance of the neck was normal [] : the neck was supple [Neck Cervical Mass (___cm)] : no neck mass was observed [Respiration, Rhythm And Depth] : normal respiratory rhythm and effort [Exaggerated Use Of Accessory Muscles For Inspiration] : no accessory muscle use [Auscultation Breath Sounds / Voice Sounds] : lungs were clear to auscultation bilaterally [Heart Rate And Rhythm] : heart rate was normal and rhythm regular [Heart Sounds] : normal S1 and S2 [Abdomen Soft] : soft [Abdomen Tenderness] : non-tender [FreeTextEntry1] : No synovitis, 5/5 upper proximal and distal strength. 5/5 proximal BLE strength Poor core strength

## 2024-05-09 NOTE — HISTORY OF PRESENT ILLNESS
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 [IVIG] : IVIG [Upper extremity weakness] : upper extremity weakness [Lower extremities weakness] : lower extremities weakness [Currently Experiencing] : currently [Muscle Weakness] : muscle weakness [___ Month(s) Ago] : [unfilled] month(s) ago [FreeTextEntry1] : 4/30/2024:  S/p 2x rituximab infusions (March 26 and April 10). Continues have get bimonthly IVIG at home (next due this weekend).  Overall continues to endorse bilateral proximal thigh pain/weakness, has been improving with PT. Endorsing dry eyes and dry mouth. Denies SOB, dysphagia, cough, CP, fevers, rashes  Planning to go to [place] for 3 weeks to visit family, will miss a dose of IVIG while abroad.  [Anorexia] : no anorexia [Weight Loss] : no weight loss [Malaise] : no malaise [Fever] : no fever [Chills] : no chills [Fatigue] : no fatigue [Depression] : no depression [Malar Facial Rash] : no malar facial rash [Skin Lesions] : no lesions [Skin Nodules] : no skin nodules [Oral Ulcers] : no oral ulcers [Cough] : no cough [Dry Mouth] : no dry mouth [Dysphonia] : no dysphonia [Dysphagia] : no dysphagia [Shortness of Breath] : no shortness of breath [Chest Pain] : no chest pain [Arthralgias] : no arthralgias [Joint Swelling] : no joint swelling [Joint Warmth] : no joint warmth [Joint Deformity] : no joint deformity [Decreased ROM] : no decreased range of motion [Morning Stiffness] : no morning stiffness [Falls] : no falls [Difficulty Standing] : no difficulty standing [Difficulty Walking] : no difficulty walking [Dyspnea] : no dyspnea [Myalgias] : no myalgias [Muscle Spasms] : no muscle spasms [Muscle Cramping] : no muscle cramping [Visual Changes] : no visual changes [Eye Pain] : no eye pain [Eye Redness] : no eye redness [Dry Eyes] : no dry eyes [TextBox_2] : August 2020 [TextBox_19] : High dose Prednisone [TextBox_24] :  1. Symmetric nonspecific myositis of the bilateral pelvic and hamstring muscles with greatest involvement of the iliopsoas and proximal rectus femoris muscles.  [TextBox_27] : necrotizing myositis [TextBox_43] : Aug 2020 - clear lungs

## 2024-05-09 NOTE — ASSESSMENT
[FreeTextEntry1] : 63 year old female with recently diagnosed statin induced myositis here for follow up  1. 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 Elevated HMGCoA Reductase antibody > 200. 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 Started on high dose Prednisone with improving CPK and LFTs, came off steroids and was on IVIg home infusion bimonthly. Had an increase in CPK concerning for failure of monotherapy with IVIG. Was started on steroid taper with good response but with tapering CK started to get elevated again. Started MMF in Apr 2022 - while there has been good response with decrease in CPK, also noted to have leukopenia. She was thus transitioned to Tacrolimus but discontinued it shortly after. She was briefly taking chinese herbal medication under the guidance of a chinese rheumatologist. Now takes it on and off due to GI discomfort with this medication. Continuing IVIg infusions monthly. Since April 2023 had a flare of her myositis for which she was restarted on Prednisone which has now been tapered off with minimal improvement in CPK levels. Was also briefly on Tacrolimus with no improvement. She is s/p Rituxan in Sept 2023 and since her infusion there has been a decline in the CPK on recent labs. Will continue to monitor. S/[ Second round of infusion in March-April 2024.  Will monitor myositis labs. As well, found to have positive SSA-52 on myomarker and SSA ab in 2023, will repeat.   2. hx of Leukopenia: drug induced? Stable  3. Bone health: Ca+D as needed. DEXA from Jan 2024 reviewed - consistent with osteopenia. BPP not indicated at this time.   Follow up in 2 months  Discussed with Dr. Ronal Bermudez MD PGY-4 Rheumatology

## 2024-05-10 LAB
ALBUMIN SERPL ELPH-MCNC: 4.2 G/DL
ALP BLD-CCNC: 91 U/L
ALT SERPL-CCNC: 72 U/L
ANION GAP SERPL CALC-SCNC: 14 MMOL/L
AST SERPL-CCNC: 69 U/L
BASOPHILS # BLD AUTO: 0.05 K/UL
BASOPHILS NFR BLD AUTO: 0.6 %
BILIRUB SERPL-MCNC: 0.5 MG/DL
BUN SERPL-MCNC: 10 MG/DL
CALCIUM SERPL-MCNC: 9 MG/DL
CD16+CD56+ CELLS # BLD: 389 CELLS/UL
CD16+CD56+ CELLS NFR BLD: 30 %
CD19 CELLS NFR BLD: 1 CELLS/UL
CD3 CELLS # BLD: 922 CELLS/UL
CD3 CELLS NFR BLD: 69 %
CD3+CD4+ CELLS # BLD: 468 CELLS/UL
CD3+CD4+ CELLS NFR BLD: 35 %
CD3+CD4+ CELLS/CD3+CD8+ CLL SPEC: 1 RATIO
CD3+CD8+ CELLS # SPEC: 470 CELLS/UL
CD3+CD8+ CELLS NFR BLD: 35 %
CELLS.CD3-CD19+/CELLS IN BLOOD: <1 %
CHLORIDE SERPL-SCNC: 101 MMOL/L
CK SERPL-CCNC: 5476 U/L
CO2 SERPL-SCNC: 21 MMOL/L
CREAT SERPL-MCNC: 0.4 MG/DL
CRP SERPL-MCNC: 8 MG/L
DEPRECATED KAPPA LC FREE/LAMBDA SER: 0.97 RATIO
EGFR: 111 ML/MIN/1.73M2
EOSINOPHIL # BLD AUTO: 0.11 K/UL
EOSINOPHIL NFR BLD AUTO: 1.4 %
ERYTHROCYTE [SEDIMENTATION RATE] IN BLOOD BY WESTERGREN METHOD: 74 MM/HR
GLUCOSE SERPL-MCNC: 203 MG/DL
HCT VFR BLD CALC: 36.3 %
HGB BLD-MCNC: 12.2 G/DL
IGA SER QL IEP: 250 MG/DL
IGG SER QL IEP: 2192 MG/DL
IGM SER QL IEP: 42 MG/DL
IMM GRANULOCYTES NFR BLD AUTO: 0.3 %
KAPPA LC CSF-MCNC: 1.16 MG/DL
KAPPA LC SERPL-MCNC: 1.13 MG/DL
LYMPHOCYTES # BLD AUTO: 1.28 K/UL
LYMPHOCYTES NFR BLD AUTO: 16.3 %
MAN DIFF?: NORMAL
MCHC RBC-ENTMCNC: 30.4 PG
MCHC RBC-ENTMCNC: 33.6 GM/DL
MCV RBC AUTO: 90.5 FL
MONOCYTES # BLD AUTO: 0.25 K/UL
MONOCYTES NFR BLD AUTO: 3.2 %
MYOGLOBIN SERPL-MCNC: 630 NG/ML
NEUTROPHILS # BLD AUTO: 6.16 K/UL
NEUTROPHILS NFR BLD AUTO: 78.2 %
PLATELET # BLD AUTO: 355 K/UL
POTASSIUM SERPL-SCNC: 4 MMOL/L
PROT SERPL-MCNC: 8 G/DL
RBC # BLD: 4.01 M/UL
RBC # FLD: 13.3 %
SODIUM SERPL-SCNC: 136 MMOL/L
WBC # FLD AUTO: 7.87 K/UL

## 2024-05-21 LAB
EJ AB SER QL: NEGATIVE
ENA JO1 AB SER IA-ACNC: <20 UNITS
ENA PM/SCL AB SER-ACNC: <20 UNITS
ENA SM+RNP AB SER IA-ACNC: <20 UNITS
ENA SS-A IGG SER QL: 28 UNITS
FIBRILLARIN AB SER QL: NEGATIVE
KU AB SER QL: NEGATIVE
MDA-5 (P140)(CADM-140): <20 UNITS
MI2 AB SER QL: NEGATIVE
NXP-2 (P140): <20 UNITS
OJ AB SER QL: NEGATIVE
PL12 AB SER QL: NEGATIVE
PL7 AB SER QL: NEGATIVE
SRP AB SERPL QL: NEGATIVE
TIF GAMMA (P155/140): <20 UNITS
U2 SNRNP AB SER QL: NEGATIVE

## 2024-05-29 ENCOUNTER — TRANSCRIPTION ENCOUNTER (OUTPATIENT)
Age: 64
End: 2024-05-29

## 2024-05-31 ENCOUNTER — TRANSCRIPTION ENCOUNTER (OUTPATIENT)
Age: 64
End: 2024-05-31

## 2024-06-05 ENCOUNTER — TRANSCRIPTION ENCOUNTER (OUTPATIENT)
Age: 64
End: 2024-06-05

## 2024-06-05 RX ORDER — PREDNISONE 5 MG/1
5 TABLET ORAL
Qty: 240 | Refills: 0 | Status: ACTIVE | COMMUNITY
Start: 2024-06-05 | End: 1900-01-01

## 2024-06-07 ENCOUNTER — TRANSCRIPTION ENCOUNTER (OUTPATIENT)
Age: 64
End: 2024-06-07

## 2024-06-07 LAB
ALBUMIN SERPL ELPH-MCNC: 4.3 G/DL
ALP BLD-CCNC: 84 U/L
ALT SERPL-CCNC: 118 U/L
ANION GAP SERPL CALC-SCNC: 15 MMOL/L
APPEARANCE: CLEAR
AST SERPL-CCNC: 109 U/L
BASOPHILS # BLD AUTO: 0.06 K/UL
BASOPHILS NFR BLD AUTO: 1 %
BILIRUB SERPL-MCNC: 0.5 MG/DL
BILIRUBIN URINE: NEGATIVE
BLOOD URINE: NEGATIVE
BUN SERPL-MCNC: 7 MG/DL
CALCIUM SERPL-MCNC: 9.2 MG/DL
CD16+CD56+ CELLS # BLD: 370 CELLS/UL
CD16+CD56+ CELLS NFR BLD: 21 %
CD19 CELLS NFR BLD: 1 CELLS/UL
CD3 CELLS # BLD: 1331 CELLS/UL
CD3 CELLS NFR BLD: 76 %
CD3+CD4+ CELLS # BLD: 728 CELLS/UL
CD3+CD4+ CELLS NFR BLD: 42 %
CD3+CD4+ CELLS/CD3+CD8+ CLL SPEC: 1.26 RATIO
CD3+CD8+ CELLS # SPEC: 578 CELLS/UL
CD3+CD8+ CELLS NFR BLD: 33 %
CELLS.CD3-CD19+/CELLS IN BLOOD: <1 %
CHLORIDE SERPL-SCNC: 101 MMOL/L
CK SERPL-CCNC: 8906 U/L
CO2 SERPL-SCNC: 21 MMOL/L
COLOR: YELLOW
CREAT SERPL-MCNC: 0.39 MG/DL
CREAT SPEC-SCNC: 22 MG/DL
CREAT/PROT UR: 0.2 RATIO
CRP SERPL-MCNC: 5 MG/L
DEPRECATED KAPPA LC FREE/LAMBDA SER: 0.84 RATIO
EGFR: 111 ML/MIN/1.73M2
EOSINOPHIL # BLD AUTO: 0.19 K/UL
EOSINOPHIL NFR BLD AUTO: 3.1 %
ERYTHROCYTE [SEDIMENTATION RATE] IN BLOOD BY WESTERGREN METHOD: 41 MM/HR
FERRITIN SERPL-MCNC: 179 NG/ML
GLUCOSE QUALITATIVE U: NEGATIVE MG/DL
GLUCOSE SERPL-MCNC: 149 MG/DL
HCT VFR BLD CALC: 37.8 %
HGB BLD-MCNC: 12.5 G/DL
IGA SER QL IEP: 214 MG/DL
IGG SER QL IEP: 1402 MG/DL
IGM SER QL IEP: 34 MG/DL
IMM GRANULOCYTES NFR BLD AUTO: 0.3 %
KAPPA LC CSF-MCNC: 1.08 MG/DL
KAPPA LC SERPL-MCNC: 0.91 MG/DL
KETONES URINE: NEGATIVE MG/DL
LEUKOCYTE ESTERASE URINE: NEGATIVE
LYMPHOCYTES # BLD AUTO: 1.53 K/UL
LYMPHOCYTES NFR BLD AUTO: 25 %
MAN DIFF?: NORMAL
MCHC RBC-ENTMCNC: 30 PG
MCHC RBC-ENTMCNC: 33.1 GM/DL
MCV RBC AUTO: 90.6 FL
MONOCYTES # BLD AUTO: 0.3 K/UL
MONOCYTES NFR BLD AUTO: 4.9 %
MYOGLOBIN SERPL-MCNC: 1324 NG/ML
NEUTROPHILS # BLD AUTO: 4.02 K/UL
NEUTROPHILS NFR BLD AUTO: 65.7 %
NITRITE URINE: NEGATIVE
PH URINE: 6.5
PLATELET # BLD AUTO: 321 K/UL
POTASSIUM SERPL-SCNC: 3.9 MMOL/L
PROT SERPL-MCNC: 7.3 G/DL
PROT UR-MCNC: 4 MG/DL
PROTEIN URINE: NEGATIVE MG/DL
RBC # BLD: 4.17 M/UL
RBC # FLD: 13.8 %
SODIUM SERPL-SCNC: 136 MMOL/L
SPECIFIC GRAVITY URINE: 1.01
UROBILINOGEN URINE: 0.2 MG/DL
WBC # FLD AUTO: 6.12 K/UL

## 2024-06-17 ENCOUNTER — TRANSCRIPTION ENCOUNTER (OUTPATIENT)
Age: 64
End: 2024-06-17

## 2024-06-24 ENCOUNTER — TRANSCRIPTION ENCOUNTER (OUTPATIENT)
Age: 64
End: 2024-06-24

## 2024-06-24 LAB
ALBUMIN SERPL ELPH-MCNC: 4.5 G/DL
ALP BLD-CCNC: 70 U/L
ALT SERPL-CCNC: 143 U/L
ANION GAP SERPL CALC-SCNC: 13 MMOL/L
AST SERPL-CCNC: 80 U/L
BASOPHILS # BLD AUTO: 0.03 K/UL
BASOPHILS NFR BLD AUTO: 0.2 %
BILIRUB SERPL-MCNC: 0.5 MG/DL
BUN SERPL-MCNC: 15 MG/DL
CALCIUM SERPL-MCNC: 9.5 MG/DL
CHLORIDE SERPL-SCNC: 99 MMOL/L
CK SERPL-CCNC: 2893 U/L
CO2 SERPL-SCNC: 23 MMOL/L
CREAT SERPL-MCNC: 0.39 MG/DL
CRP SERPL-MCNC: <3 MG/L
EGFR: 111 ML/MIN/1.73M2
EOSINOPHIL # BLD AUTO: 0.01 K/UL
EOSINOPHIL NFR BLD AUTO: 0.1 %
ERYTHROCYTE [SEDIMENTATION RATE] IN BLOOD BY WESTERGREN METHOD: 45 MM/HR
GLUCOSE SERPL-MCNC: 151 MG/DL
HCT VFR BLD CALC: 41.3 %
HGB BLD-MCNC: 13.5 G/DL
IMM GRANULOCYTES NFR BLD AUTO: 0.5 %
LYMPHOCYTES # BLD AUTO: 1.61 K/UL
LYMPHOCYTES NFR BLD AUTO: 12.8 %
MAN DIFF?: NORMAL
MCHC RBC-ENTMCNC: 29.3 PG
MCHC RBC-ENTMCNC: 32.7 GM/DL
MCV RBC AUTO: 89.8 FL
MONOCYTES # BLD AUTO: 0.32 K/UL
MONOCYTES NFR BLD AUTO: 2.5 %
MYOGLOBIN SERPL-MCNC: 816 NG/ML
NEUTROPHILS # BLD AUTO: 10.57 K/UL
NEUTROPHILS NFR BLD AUTO: 83.9 %
PLATELET # BLD AUTO: 340 K/UL
POTASSIUM SERPL-SCNC: 4.2 MMOL/L
PROT SERPL-MCNC: 8 G/DL
RBC # BLD: 4.6 M/UL
RBC # FLD: 14.6 %
SODIUM SERPL-SCNC: 135 MMOL/L
WBC # FLD AUTO: 12.6 K/UL

## 2024-06-25 ENCOUNTER — TRANSCRIPTION ENCOUNTER (OUTPATIENT)
Age: 64
End: 2024-06-25

## 2024-07-05 ENCOUNTER — RX RENEWAL (OUTPATIENT)
Age: 64
End: 2024-07-05

## 2024-07-08 ENCOUNTER — LABORATORY RESULT (OUTPATIENT)
Age: 64
End: 2024-07-08

## 2024-07-08 ENCOUNTER — TRANSCRIPTION ENCOUNTER (OUTPATIENT)
Age: 64
End: 2024-07-08

## 2024-07-09 ENCOUNTER — APPOINTMENT (OUTPATIENT)
Dept: RHEUMATOLOGY | Facility: CLINIC | Age: 64
End: 2024-07-09
Payer: COMMERCIAL

## 2024-07-09 VITALS
SYSTOLIC BLOOD PRESSURE: 127 MMHG | WEIGHT: 128 LBS | OXYGEN SATURATION: 98 % | DIASTOLIC BLOOD PRESSURE: 86 MMHG | HEART RATE: 96 BPM | BODY MASS INDEX: 22.68 KG/M2 | HEIGHT: 63 IN

## 2024-07-09 PROCEDURE — 99214 OFFICE O/P EST MOD 30 MIN: CPT | Mod: GC

## 2024-07-09 PROCEDURE — G2211 COMPLEX E/M VISIT ADD ON: CPT | Mod: NC

## 2024-07-12 LAB
ALP BLD-CCNC: 73 U/L
ALT SERPL-CCNC: 114 U/L
APPEARANCE: CLEAR
BASOPHILS # BLD AUTO: 0.04 K/UL
BASOPHILS NFR BLD AUTO: 0.5 %
BILIRUB SERPL-MCNC: 0.5 MG/DL
BILIRUBIN URINE: NEGATIVE
BLOOD URINE: NEGATIVE
BUN SERPL-MCNC: 17 MG/DL
CALCIUM SERPL-MCNC: 9.8 MG/DL
CHLORIDE SERPL-SCNC: 95 MMOL/L
CK SERPL-CCNC: 2218 U/L
CO2 SERPL-SCNC: 24 MMOL/L
COLOR: YELLOW
CREAT SERPL-MCNC: 0.51 MG/DL
CREAT SPEC-SCNC: 58 MG/DL
CREAT/PROT UR: 0.2 RATIO
CRP SERPL-MCNC: 3 MG/L
EGFR: 104 ML/MIN/1.73M2
EOSINOPHIL # BLD AUTO: 0.09 K/UL
EOSINOPHIL NFR BLD AUTO: 1.2 %
FERRITIN SERPL-MCNC: 281 NG/ML
GLUCOSE QUALITATIVE U: NEGATIVE MG/DL
GLUCOSE SERPL-MCNC: 167 MG/DL
HCT VFR BLD CALC: 42 %
HGB BLD-MCNC: 13.7 G/DL
IMM GRANULOCYTES NFR BLD AUTO: 1.2 %
LEUKOCYTE ESTERASE URINE: ABNORMAL
LYMPHOCYTES # BLD AUTO: 1.45 K/UL
LYMPHOCYTES NFR BLD AUTO: 19.1 %
MAN DIFF?: NORMAL
MCHC RBC-ENTMCNC: 30.1 PG
MCHC RBC-ENTMCNC: 32.6 GM/DL
MONOCYTES # BLD AUTO: 0.41 K/UL
MYOGLOBIN SERPL-MCNC: 784 NG/ML
NEUTROPHILS # BLD AUTO: 5.51 K/UL
NEUTROPHILS NFR BLD AUTO: 72.6 %
NITRITE URINE: NEGATIVE
PH URINE: 6
PLATELET # BLD AUTO: 312 K/UL
POTASSIUM SERPL-SCNC: 4.1 MMOL/L
PROT SERPL-MCNC: 7.8 G/DL
PROT UR-MCNC: 10 MG/DL
PROTEIN URINE: NEGATIVE MG/DL
RBC # BLD: 4.55 M/UL
RBC # FLD: 15.4 %
SODIUM SERPL-SCNC: 131 MMOL/L
SPECIFIC GRAVITY URINE: 1.01
UROBILINOGEN URINE: 0.2 MG/DL
WBC # FLD AUTO: 7.59 K/UL

## 2024-07-20 ENCOUNTER — TRANSCRIPTION ENCOUNTER (OUTPATIENT)
Age: 64
End: 2024-07-20

## 2024-07-22 ENCOUNTER — TRANSCRIPTION ENCOUNTER (OUTPATIENT)
Age: 64
End: 2024-07-22

## 2024-07-22 LAB
BASOPHILS # BLD AUTO: 0.05 K/UL
BASOPHILS NFR BLD AUTO: 0.9 %
EOSINOPHIL # BLD AUTO: 0.08 K/UL
EOSINOPHIL NFR BLD AUTO: 1.4 %
ERYTHROCYTE [SEDIMENTATION RATE] IN BLOOD BY WESTERGREN METHOD: 42 MM/HR
HCT VFR BLD CALC: 40.6 %
HGB BLD-MCNC: 13.3 G/DL
IMM GRANULOCYTES NFR BLD AUTO: 1.9 %
LYMPHOCYTES # BLD AUTO: 1.59 K/UL
LYMPHOCYTES NFR BLD AUTO: 28.1 %
MAN DIFF?: NORMAL
MCHC RBC-ENTMCNC: 30.2 PG
MCHC RBC-ENTMCNC: 32.8 GM/DL
MCV RBC AUTO: 92.3 FL
MONOCYTES # BLD AUTO: 0.28 K/UL
MONOCYTES NFR BLD AUTO: 5 %
MYOGLOBIN SERPL-MCNC: 522 NG/ML
NEUTROPHILS # BLD AUTO: 3.54 K/UL
NEUTROPHILS NFR BLD AUTO: 62.7 %
PLATELET # BLD AUTO: 293 K/UL
RBC # BLD: 4.4 M/UL
RBC # FLD: 16.1 %
WBC # FLD AUTO: 5.65 K/UL

## 2024-07-23 LAB
ALBUMIN SERPL ELPH-MCNC: 4.1 G/DL
ALP BLD-CCNC: 63 U/L
ALT SERPL-CCNC: 95 U/L
ANION GAP SERPL CALC-SCNC: 16 MMOL/L
AST SERPL-CCNC: 63 U/L
BILIRUB SERPL-MCNC: 0.6 MG/DL
BUN SERPL-MCNC: 11 MG/DL
CALCIUM SERPL-MCNC: 9.1 MG/DL
CHLORIDE SERPL-SCNC: 99 MMOL/L
CK SERPL-CCNC: 1634 U/L
CO2 SERPL-SCNC: 22 MMOL/L
CREAT SERPL-MCNC: 0.45 MG/DL
CRP SERPL-MCNC: <3 MG/L
EGFR: 107 ML/MIN/1.73M2
GLUCOSE SERPL-MCNC: 147 MG/DL
POTASSIUM SERPL-SCNC: 4 MMOL/L
PROT SERPL-MCNC: 7.5 G/DL
SODIUM SERPL-SCNC: 137 MMOL/L

## 2024-07-29 ENCOUNTER — TRANSCRIPTION ENCOUNTER (OUTPATIENT)
Age: 64
End: 2024-07-29

## 2024-08-09 ENCOUNTER — TRANSCRIPTION ENCOUNTER (OUTPATIENT)
Age: 64
End: 2024-08-09

## 2024-08-09 LAB
ALBUMIN SERPL ELPH-MCNC: 4.2 G/DL
ALP BLD-CCNC: 64 U/L
ALT SERPL-CCNC: 65 U/L
ANION GAP SERPL CALC-SCNC: 16 MMOL/L
AST SERPL-CCNC: 46 U/L
BASOPHILS # BLD AUTO: 0.04 K/UL
BASOPHILS NFR BLD AUTO: 0.7 %
BILIRUB SERPL-MCNC: 0.6 MG/DL
BUN SERPL-MCNC: 15 MG/DL
CALCIUM SERPL-MCNC: 9.2 MG/DL
CHLORIDE SERPL-SCNC: 98 MMOL/L
CK SERPL-CCNC: 1228 U/L
CO2 SERPL-SCNC: 22 MMOL/L
CREAT SERPL-MCNC: 0.43 MG/DL
EGFR: 109 ML/MIN/1.73M2
EOSINOPHIL # BLD AUTO: 0.08 K/UL
EOSINOPHIL NFR BLD AUTO: 1.4 %
GLUCOSE SERPL-MCNC: 169 MG/DL
HCT VFR BLD CALC: 41.6 %
HGB BLD-MCNC: 14.1 G/DL
IMM GRANULOCYTES NFR BLD AUTO: 2.2 %
LYMPHOCYTES # BLD AUTO: 1.71 K/UL
LYMPHOCYTES NFR BLD AUTO: 29.6 %
MAN DIFF?: NORMAL
MCHC RBC-ENTMCNC: 30.8 PG
MCHC RBC-ENTMCNC: 33.9 GM/DL
MCV RBC AUTO: 90.8 FL
MONOCYTES # BLD AUTO: 0.32 K/UL
MONOCYTES NFR BLD AUTO: 5.5 %
MYOGLOBIN SERPL-MCNC: 290 NG/ML
NEUTROPHILS # BLD AUTO: 3.5 K/UL
NEUTROPHILS NFR BLD AUTO: 60.6 %
PLATELET # BLD AUTO: 318 K/UL
POTASSIUM SERPL-SCNC: 4.1 MMOL/L
PROT SERPL-MCNC: 8 G/DL
RBC # BLD: 4.58 M/UL
RBC # FLD: 15.9 %
SODIUM SERPL-SCNC: 136 MMOL/L
WBC # FLD AUTO: 5.78 K/UL

## 2024-08-12 ENCOUNTER — TRANSCRIPTION ENCOUNTER (OUTPATIENT)
Age: 64
End: 2024-08-12

## 2024-08-19 ENCOUNTER — NON-APPOINTMENT (OUTPATIENT)
Age: 64
End: 2024-08-19

## 2024-08-20 ENCOUNTER — APPOINTMENT (OUTPATIENT)
Dept: RHEUMATOLOGY | Facility: CLINIC | Age: 64
End: 2024-08-20
Payer: COMMERCIAL

## 2024-08-20 VITALS
HEIGHT: 63 IN | DIASTOLIC BLOOD PRESSURE: 86 MMHG | RESPIRATION RATE: 16 BRPM | OXYGEN SATURATION: 98 % | WEIGHT: 130 LBS | BODY MASS INDEX: 23.04 KG/M2 | HEART RATE: 73 BPM | SYSTOLIC BLOOD PRESSURE: 136 MMHG

## 2024-08-20 PROCEDURE — 99214 OFFICE O/P EST MOD 30 MIN: CPT

## 2024-08-20 PROCEDURE — G2211 COMPLEX E/M VISIT ADD ON: CPT | Mod: NC

## 2024-08-20 NOTE — PHYSICAL EXAM
[General Appearance - In No Acute Distress] : in no acute distress [General Appearance - Alert] : alert [Extraocular Movements] : extraocular movements were intact [Oropharynx] : the oropharynx was normal [Neck Appearance] : the appearance of the neck was normal [] : the neck was supple [Neck Cervical Mass (___cm)] : no neck mass was observed [Respiration, Rhythm And Depth] : normal respiratory rhythm and effort [Exaggerated Use Of Accessory Muscles For Inspiration] : no accessory muscle use [Auscultation Breath Sounds / Voice Sounds] : lungs were clear to auscultation bilaterally [Heart Rate And Rhythm] : heart rate was normal and rhythm regular [Heart Sounds] : normal S1 and S2 [Abdomen Soft] : soft [Abdomen Tenderness] : non-tender [Abnormal Walk] : normal gait [Deep Tendon Reflexes (DTR)] : deep tendon reflexes were 2+ and symmetric [Oriented To Time, Place, And Person] : oriented to person, place, and time [FreeTextEntry1] : No synovitis, 5/5 upper proximal and distal strength. 5/5 proximal BLE strength Poor core strength

## 2024-08-20 NOTE — ASSESSMENT
[FreeTextEntry1] : 64 year old female with recently diagnosed statin induced myositis here for follow up  1. 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 Elevated HMGCoA Reductase antibody > 200. 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 Started on high dose Prednisone with improving CPK and LFTs, came off steroids and was on IVIg home infusion bimonthly. Had an increase in CPK concerning for failure of monotherapy with IVIG. Was started on steroid taper with good response but with tapering CK started to get elevated again. Started MMF in Apr 2022 - while there has been good response with decrease in CPK, also noted to have leukopenia. She was thus transitioned to Tacrolimus but discontinued it shortly after. Continuing IVIg infusions every other week. In April 2023 had a flare of her myositis for which she was restarted on Prednisone and IVIg. Was also briefly on Tacrolimus with no improvement. She is s/p Rituxan in Sept 2023 and March - April 2024 with no improvement in her CPK levels, She again had a myositis flare in May 2024, elevated CK and generalized weakness despite Rituxan infusion in April 2024. She was started on Prednisone 30mg on June 7th, currently on 15 mg with slowly improving weakness and CPK levels. Decrease dose to 12. 5 mg daily and repeat labs in 2 weeks. If there is plateauing or worsening, could consider Gazyva or Imuran.  -Referred to PCP for cancer appropriate screening: due for Paps smear and mammogram . Recent colonoscopy was normal per patient  2. Elevated A1C: advised to keep pre and post meal logs and discuss with PCP regarding additional therapy for diabetes. Currently on Metformin 1000 mg BID.   3. Bone health: Ca+D as needed. DEXA from Jan 2024 reviewed - consistent with osteopenia. BPP not indicated at this time.   Follow up in 6 weeks but repeat labs in 2 weeks.

## 2024-08-20 NOTE — HISTORY OF PRESENT ILLNESS
[___ Week(s) Ago] : [unfilled] week(s) ago 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 [IVIG] : IVIG [Upper extremity weakness] : upper extremity weakness [Lower extremities weakness] : lower extremities weakness [Currently Experiencing] : currently [Muscle Weakness] : muscle weakness [FreeTextEntry1] : On Prednisone 15 mg daily - improving CK levels and LFTs normalized. Weakness has resolved. Feels more energetic. Labs indicate worsening A1C.   [Anorexia] : no anorexia [Weight Loss] : no weight loss [Malaise] : no malaise [Fever] : no fever [Chills] : no chills [Fatigue] : no fatigue [Depression] : no depression [Malar Facial Rash] : no malar facial rash [Skin Lesions] : no lesions [Skin Nodules] : no skin nodules [Oral Ulcers] : no oral ulcers [Cough] : no cough [Dry Mouth] : no dry mouth [Dysphonia] : no dysphonia [Dysphagia] : no dysphagia [Shortness of Breath] : no shortness of breath [Chest Pain] : no chest pain [Arthralgias] : no arthralgias [Joint Swelling] : no joint swelling [Joint Warmth] : no joint warmth [Joint Deformity] : no joint deformity [Decreased ROM] : no decreased range of motion [Morning Stiffness] : no morning stiffness [Falls] : no falls [Difficulty Standing] : no difficulty standing [Difficulty Walking] : no difficulty walking [Dyspnea] : no dyspnea [Myalgias] : no myalgias [Muscle Spasms] : no muscle spasms [Muscle Cramping] : no muscle cramping [Visual Changes] : no visual changes [Eye Pain] : no eye pain [Eye Redness] : no eye redness [Dry Eyes] : no dry eyes [TextBox_2] : August 2020 [TextBox_19] : High dose Prednisone [TextBox_24] :  1. Symmetric nonspecific myositis of the bilateral pelvic and hamstring muscles with greatest involvement of the iliopsoas and proximal rectus femoris muscles.  [TextBox_27] : necrotizing myositis [TextBox_43] : Aug 2020 - clear lungs

## 2024-08-20 NOTE — REVIEW OF SYSTEMS
[As Noted in HPI] : as noted in HPI [Limb Weakness] : limb weakness [Muscle Weakness] : muscle weakness [Feelings Of Weakness] : feelings of weakness [Negative] : Psychiatric [de-identified] : +livedo rash on b/l arms and thighs

## 2024-08-22 LAB
ALBUMIN SERPL ELPH-MCNC: 4.2 G/DL
ALP BLD-CCNC: 60 U/L
ALT SERPL-CCNC: 44 U/L
ANION GAP SERPL CALC-SCNC: 12 MMOL/L
AST SERPL-CCNC: 35 U/L
BASOPHILS # BLD AUTO: 0.04 K/UL
BASOPHILS NFR BLD AUTO: 0.7 %
BILIRUB SERPL-MCNC: 0.6 MG/DL
BUN SERPL-MCNC: 13 MG/DL
CALCIUM SERPL-MCNC: 9.4 MG/DL
CHLORIDE SERPL-SCNC: 99 MMOL/L
CHOLEST SERPL-MCNC: 286 MG/DL
CK SERPL-CCNC: 956 U/L
CO2 SERPL-SCNC: 24 MMOL/L
CREAT SERPL-MCNC: 0.4 MG/DL
CRP SERPL-MCNC: <3 MG/L
EGFR: 110 ML/MIN/1.73M2
EOSINOPHIL # BLD AUTO: 0.1 K/UL
EOSINOPHIL NFR BLD AUTO: 1.7 %
ERYTHROCYTE [SEDIMENTATION RATE] IN BLOOD BY WESTERGREN METHOD: 52 MM/HR
ESTIMATED AVERAGE GLUCOSE: 214 MG/DL
GLUCOSE SERPL-MCNC: 172 MG/DL
HBA1C MFR BLD HPLC: 9.1 %
HCT VFR BLD CALC: 41.8 %
HDLC SERPL-MCNC: 80 MG/DL
HGB BLD-MCNC: 13.4 G/DL
IMM GRANULOCYTES NFR BLD AUTO: 0.3 %
LDLC SERPL CALC-MCNC: 164 MG/DL
LYMPHOCYTES # BLD AUTO: 1.72 K/UL
LYMPHOCYTES NFR BLD AUTO: 29.8 %
MAN DIFF?: NORMAL
MCHC RBC-ENTMCNC: 30.1 PG
MCHC RBC-ENTMCNC: 32.1 GM/DL
MCV RBC AUTO: 93.9 FL
MONOCYTES # BLD AUTO: 0.36 K/UL
MONOCYTES NFR BLD AUTO: 6.2 %
NEUTROPHILS # BLD AUTO: 3.53 K/UL
NEUTROPHILS NFR BLD AUTO: 61.3 %
NONHDLC SERPL-MCNC: 206 MG/DL
PLATELET # BLD AUTO: 294 K/UL
POTASSIUM SERPL-SCNC: 4.1 MMOL/L
PROT SERPL-MCNC: 8.3 G/DL
RBC # BLD: 4.45 M/UL
RBC # FLD: 16 %
SODIUM SERPL-SCNC: 135 MMOL/L
TRIGL SERPL-MCNC: 235 MG/DL
WBC # FLD AUTO: 5.77 K/UL

## 2024-09-06 ENCOUNTER — TRANSCRIPTION ENCOUNTER (OUTPATIENT)
Age: 64
End: 2024-09-06

## 2024-09-13 ENCOUNTER — TRANSCRIPTION ENCOUNTER (OUTPATIENT)
Age: 64
End: 2024-09-13

## 2024-09-19 ENCOUNTER — TRANSCRIPTION ENCOUNTER (OUTPATIENT)
Age: 64
End: 2024-09-19

## 2024-09-19 LAB
ALBUMIN SERPL ELPH-MCNC: 4.4 G/DL
ALP BLD-CCNC: 69 U/L
ALT SERPL-CCNC: 30 U/L
ANION GAP SERPL CALC-SCNC: 14 MMOL/L
AST SERPL-CCNC: 27 U/L
BASOPHILS # BLD AUTO: 0.06 K/UL
BASOPHILS NFR BLD AUTO: 1.2 %
BILIRUB SERPL-MCNC: 0.7 MG/DL
BUN SERPL-MCNC: 8 MG/DL
CALCIUM SERPL-MCNC: 9.5 MG/DL
CHLORIDE SERPL-SCNC: 97 MMOL/L
CK SERPL-CCNC: 643 U/L
CO2 SERPL-SCNC: 23 MMOL/L
CREAT SERPL-MCNC: 0.43 MG/DL
CRP SERPL-MCNC: <3 MG/L
EGFR: 109 ML/MIN/1.73M2
EOSINOPHIL # BLD AUTO: 0.09 K/UL
EOSINOPHIL NFR BLD AUTO: 1.9 %
ERYTHROCYTE [SEDIMENTATION RATE] IN BLOOD BY WESTERGREN METHOD: 42 MM/HR
GLUCOSE SERPL-MCNC: 159 MG/DL
HCT VFR BLD CALC: 40.5 %
HGB BLD-MCNC: 13 G/DL
IMM GRANULOCYTES NFR BLD AUTO: 0.2 %
LYMPHOCYTES # BLD AUTO: 1.49 K/UL
LYMPHOCYTES NFR BLD AUTO: 30.7 %
MAN DIFF?: NORMAL
MCHC RBC-ENTMCNC: 31.7 PG
MCHC RBC-ENTMCNC: 32.1 GM/DL
MCV RBC AUTO: 98.8 FL
MONOCYTES # BLD AUTO: 0.26 K/UL
MONOCYTES NFR BLD AUTO: 5.3 %
MYOGLOBIN SERPL-MCNC: 107 NG/ML
NEUTROPHILS # BLD AUTO: 2.95 K/UL
NEUTROPHILS NFR BLD AUTO: 60.7 %
PLATELET # BLD AUTO: 278 K/UL
POTASSIUM SERPL-SCNC: 3.7 MMOL/L
PROT SERPL-MCNC: 8.5 G/DL
RBC # BLD: 4.1 M/UL
RBC # FLD: 15.3 %
SODIUM SERPL-SCNC: 134 MMOL/L
WBC # FLD AUTO: 4.86 K/UL

## 2024-09-20 ENCOUNTER — TRANSCRIPTION ENCOUNTER (OUTPATIENT)
Age: 64
End: 2024-09-20

## 2024-10-02 ENCOUNTER — TRANSCRIPTION ENCOUNTER (OUTPATIENT)
Age: 64
End: 2024-10-02

## 2024-10-03 ENCOUNTER — TRANSCRIPTION ENCOUNTER (OUTPATIENT)
Age: 64
End: 2024-10-03

## 2024-10-04 ENCOUNTER — TRANSCRIPTION ENCOUNTER (OUTPATIENT)
Age: 64
End: 2024-10-04

## 2024-10-15 ENCOUNTER — APPOINTMENT (OUTPATIENT)
Dept: RHEUMATOLOGY | Facility: CLINIC | Age: 64
End: 2024-10-15

## 2024-10-15 VITALS
SYSTOLIC BLOOD PRESSURE: 133 MMHG | DIASTOLIC BLOOD PRESSURE: 81 MMHG | HEART RATE: 66 BPM | HEIGHT: 63 IN | WEIGHT: 132 LBS | OXYGEN SATURATION: 98 % | BODY MASS INDEX: 23.39 KG/M2

## 2024-10-15 PROCEDURE — 99215 OFFICE O/P EST HI 40 MIN: CPT | Mod: GC

## 2024-10-15 PROCEDURE — G2211 COMPLEX E/M VISIT ADD ON: CPT | Mod: NC

## 2024-10-18 ENCOUNTER — TRANSCRIPTION ENCOUNTER (OUTPATIENT)
Age: 64
End: 2024-10-18

## 2024-10-25 ENCOUNTER — NON-APPOINTMENT (OUTPATIENT)
Age: 64
End: 2024-10-25

## 2024-10-25 ENCOUNTER — TRANSCRIPTION ENCOUNTER (OUTPATIENT)
Age: 64
End: 2024-10-25

## 2024-10-28 ENCOUNTER — TRANSCRIPTION ENCOUNTER (OUTPATIENT)
Age: 64
End: 2024-10-28

## 2024-11-05 ENCOUNTER — TRANSCRIPTION ENCOUNTER (OUTPATIENT)
Age: 64
End: 2024-11-05

## 2024-11-20 ENCOUNTER — TRANSCRIPTION ENCOUNTER (OUTPATIENT)
Age: 64
End: 2024-11-20

## 2024-11-21 ENCOUNTER — TRANSCRIPTION ENCOUNTER (OUTPATIENT)
Age: 64
End: 2024-11-21

## 2024-11-21 LAB
ALBUMIN SERPL ELPH-MCNC: 4.4 G/DL
ALP BLD-CCNC: 82 U/L
ALT SERPL-CCNC: 26 U/L
ANION GAP SERPL CALC-SCNC: 13 MMOL/L
AST SERPL-CCNC: 24 U/L
BASOPHILS # BLD AUTO: 0.06 K/UL
BASOPHILS NFR BLD AUTO: 1.1 %
BILIRUB SERPL-MCNC: 0.7 MG/DL
BUN SERPL-MCNC: 9 MG/DL
CALCIUM SERPL-MCNC: 9.5 MG/DL
CHLORIDE SERPL-SCNC: 97 MMOL/L
CK SERPL-CCNC: 406 U/L
CO2 SERPL-SCNC: 25 MMOL/L
CREAT SERPL-MCNC: 0.45 MG/DL
EGFR: 107 ML/MIN/1.73M2
EOSINOPHIL # BLD AUTO: 0.09 K/UL
EOSINOPHIL NFR BLD AUTO: 1.7 %
GLUCOSE SERPL-MCNC: 153 MG/DL
HCT VFR BLD CALC: 42.2 %
HGB BLD-MCNC: 14.2 G/DL
IMM GRANULOCYTES NFR BLD AUTO: 0.4 %
LYMPHOCYTES # BLD AUTO: 0.97 K/UL
LYMPHOCYTES NFR BLD AUTO: 18 %
MAN DIFF?: NORMAL
MCHC RBC-ENTMCNC: 31.6 PG
MCHC RBC-ENTMCNC: 33.6 G/DL
MCV RBC AUTO: 94 FL
MONOCYTES # BLD AUTO: 0.33 K/UL
MONOCYTES NFR BLD AUTO: 6.1 %
NEUTROPHILS # BLD AUTO: 3.92 K/UL
NEUTROPHILS NFR BLD AUTO: 72.7 %
PLATELET # BLD AUTO: 253 K/UL
POTASSIUM SERPL-SCNC: 4.3 MMOL/L
PROT SERPL-MCNC: 9.2 G/DL
RBC # BLD: 4.49 M/UL
RBC # FLD: 14 %
SODIUM SERPL-SCNC: 135 MMOL/L
WBC # FLD AUTO: 5.39 K/UL

## 2024-12-17 ENCOUNTER — APPOINTMENT (OUTPATIENT)
Dept: RHEUMATOLOGY | Facility: CLINIC | Age: 64
End: 2024-12-17
Payer: COMMERCIAL

## 2024-12-17 VITALS
DIASTOLIC BLOOD PRESSURE: 89 MMHG | BODY MASS INDEX: 23.57 KG/M2 | HEART RATE: 76 BPM | HEIGHT: 63 IN | OXYGEN SATURATION: 98 % | SYSTOLIC BLOOD PRESSURE: 142 MMHG | WEIGHT: 133 LBS

## 2024-12-17 DIAGNOSIS — M25.551 PAIN IN RIGHT HIP: ICD-10-CM

## 2024-12-17 PROCEDURE — G2211 COMPLEX E/M VISIT ADD ON: CPT | Mod: NC

## 2024-12-17 PROCEDURE — 99215 OFFICE O/P EST HI 40 MIN: CPT | Mod: GC

## 2024-12-17 RX ORDER — ALENDRONATE SODIUM 70 MG/1
70 TABLET ORAL
Qty: 4 | Refills: 2 | Status: ACTIVE | COMMUNITY
Start: 2024-12-17 | End: 1900-01-01

## 2024-12-25 PROBLEM — F10.90 ALCOHOL USE: Status: RESOLVED | Noted: 2023-09-26 | Resolved: 2024-01-25

## 2025-01-06 ENCOUNTER — OUTPATIENT (OUTPATIENT)
Dept: OUTPATIENT SERVICES | Facility: HOSPITAL | Age: 65
LOS: 1 days | End: 2025-01-06
Payer: COMMERCIAL

## 2025-01-06 ENCOUNTER — APPOINTMENT (OUTPATIENT)
Dept: RADIOLOGY | Facility: CLINIC | Age: 65
End: 2025-01-06
Payer: COMMERCIAL

## 2025-01-06 DIAGNOSIS — M25.551 PAIN IN RIGHT HIP: ICD-10-CM

## 2025-01-06 PROCEDURE — 73502 X-RAY EXAM HIP UNI 2-3 VIEWS: CPT

## 2025-01-06 PROCEDURE — 73502 X-RAY EXAM HIP UNI 2-3 VIEWS: CPT | Mod: 26,RT

## 2025-01-09 ENCOUNTER — APPOINTMENT (OUTPATIENT)
Dept: PULMONOLOGY | Facility: CLINIC | Age: 65
End: 2025-01-09
Payer: COMMERCIAL

## 2025-01-09 VITALS
WEIGHT: 137.2 LBS | SYSTOLIC BLOOD PRESSURE: 129 MMHG | BODY MASS INDEX: 24.93 KG/M2 | HEART RATE: 72 BPM | DIASTOLIC BLOOD PRESSURE: 79 MMHG | OXYGEN SATURATION: 98 % | HEIGHT: 62.4 IN

## 2025-01-09 PROCEDURE — 94729 DIFFUSING CAPACITY: CPT

## 2025-01-09 PROCEDURE — 94726 PLETHYSMOGRAPHY LUNG VOLUMES: CPT

## 2025-01-09 PROCEDURE — 94010 BREATHING CAPACITY TEST: CPT

## 2025-01-17 ENCOUNTER — TRANSCRIPTION ENCOUNTER (OUTPATIENT)
Age: 65
End: 2025-01-17

## 2025-01-20 ENCOUNTER — TRANSCRIPTION ENCOUNTER (OUTPATIENT)
Age: 65
End: 2025-01-20

## 2025-01-24 ENCOUNTER — TRANSCRIPTION ENCOUNTER (OUTPATIENT)
Age: 65
End: 2025-01-24

## 2025-01-24 LAB
CK SERPL-CCNC: 1914 U/L
MYOGLOBIN SERPL-MCNC: 236 NG/ML

## 2025-01-27 ENCOUNTER — TRANSCRIPTION ENCOUNTER (OUTPATIENT)
Age: 65
End: 2025-01-27

## 2025-01-31 ENCOUNTER — TRANSCRIPTION ENCOUNTER (OUTPATIENT)
Age: 65
End: 2025-01-31

## 2025-01-31 LAB — CK SERPL-CCNC: 1366 U/L

## 2025-02-07 LAB — CK SERPL-CCNC: 1566 U/L

## 2025-02-12 ENCOUNTER — TRANSCRIPTION ENCOUNTER (OUTPATIENT)
Age: 65
End: 2025-02-12

## 2025-02-14 ENCOUNTER — TRANSCRIPTION ENCOUNTER (OUTPATIENT)
Age: 65
End: 2025-02-14

## 2025-02-25 ENCOUNTER — APPOINTMENT (OUTPATIENT)
Dept: RHEUMATOLOGY | Facility: CLINIC | Age: 65
End: 2025-02-25
Payer: COMMERCIAL

## 2025-02-25 VITALS
OXYGEN SATURATION: 99 % | SYSTOLIC BLOOD PRESSURE: 152 MMHG | BODY MASS INDEX: 24.17 KG/M2 | WEIGHT: 133 LBS | HEIGHT: 62.4 IN | DIASTOLIC BLOOD PRESSURE: 89 MMHG | HEART RATE: 71 BPM

## 2025-02-25 PROCEDURE — 99215 OFFICE O/P EST HI 40 MIN: CPT

## 2025-02-25 PROCEDURE — G2211 COMPLEX E/M VISIT ADD ON: CPT | Mod: NC

## 2025-03-06 ENCOUNTER — TRANSCRIPTION ENCOUNTER (OUTPATIENT)
Age: 65
End: 2025-03-06

## 2025-03-06 LAB
ALBUMIN SERPL ELPH-MCNC: 4 G/DL
ALP BLD-CCNC: 64 U/L
ALT SERPL-CCNC: 32 U/L
ANION GAP SERPL CALC-SCNC: 13 MMOL/L
ANTI-BETA2 GLYCOPROTEIN 1 IGG CONCENTRATION (ELFA): 5.4 U/ML
ANTI-BETA2 GLYCOPROTEIN 1 IGM CONCENTRATION (ELFA): <2.4 U/ML
ANTI-CARDIOLIPIN IGG CONCENTRATION (ELFA): 5.5 GPL
ANTI-CARDIOLIPIN IGM CONCENTRATION (ELFA): <0.9 MPL
ANTI-CENP IGG CONCENTRATION (ELFA): 3 U/ML
ANTI-CYCLIC CITRULLINATED PEPTIDE IGG CONCENTRATION (ELFA): 4.6 U/ML
ANTI-DOUBLE-STRANDED DNA IGG CONCENTRATION (ELISA): 107.75 IU/ML
ANTI-JO-1 IGG CONCENTRATION (ELFA): 0.7 U/ML
ANTI-NUCLEAR ANTIBODIES - CYTOPLASMIC PATTERN: NORMAL
ANTI-NUCLEAR ANTIBODIES - PRIMARY NUCLEAR PATTERN: NORMAL
ANTI-NUCLEAR ANTIBODIES - PRIMARY PATTERN TITER (IFA): ABNORMAL
ANTI-NUCLEAR ANTIBODIES IGG CONCENTRATION (ELISA): 124.47 UNITS
ANTI-RA33 IGA CONCENTRATION (ELFA): 1.8 U/ML
ANTI-RA33 IGG CONCENTRATION (ELFA): 12 U/ML
ANTI-RA33 IGM CONCENTRATION (ELFA): <23 U/ML
ANTI-RNA POL III IGG CONCENTRATION (ELFA): 0.9 U/ML
ANTI-RNP70 IGG CONCENTRATION (ELFA): 4.2 U/ML
ANTI-RO52 IGG CONCENTRATION (ELFA): 23 U/ML
ANTI-RO60 IGG CONCENTRATION (ELFA): 46 U/ML
ANTI-SCL-70 IGG CONCENTRATION (ELFA): 1.8 U/ML
ANTI-SMITH IGG CONCENTRATION (ELFA): 1.4 U/ML
ANTI-SS-B (LA) IGG CONCENTRATION (ELFA): 6.3 U/ML
ANTI-THYROGLOBULIN IGG CONCENTRATION (ELFA): 197 IU/ML
ANTI-THYROID PEROXIDASE IGG CONCENTRATION (ELFA): 96 IU/ML
ANTI-U1RNP IGG CONCENTRATION (ELFA): 3.9 U/ML
AST SERPL-CCNC: 34 U/L
AVISE LUPUS RESULT: NORMAL
B-LYMPHOCYTE-BOUND C4D (BC4D) LEVEL (FC): NORMAL
BASOPHILS # BLD AUTO: 0.04 K/UL
BASOPHILS NFR BLD AUTO: 1.1 %
BILIRUB SERPL-MCNC: 0.5 MG/DL
BUN SERPL-MCNC: 11 MG/DL
CALCIUM SERPL-MCNC: 9.1 MG/DL
CD16+CD56+ CELLS # BLD: 91 CELLS/UL
CD16+CD56+ CELLS NFR BLD: 13 %
CD19 CELLS NFR BLD: 0 CELLS/UL
CD3 CELLS # BLD: 654 CELLS/UL
CD3 CELLS NFR BLD: 86 %
CD3+CD4+ CELLS # BLD: 454 CELLS/UL
CD3+CD4+ CELLS NFR BLD: 56 %
CD3+CD4+ CELLS/CD3+CD8+ CLL SPEC: 1.96 RATIO
CD3+CD8+ CELLS # SPEC: 232 CELLS/UL
CD3+CD8+ CELLS NFR BLD: 29 %
CELLS.CD3-CD19+/CELLS IN BLOOD: <1 %
CHLORIDE SERPL-SCNC: 96 MMOL/L
CK SERPL-CCNC: 1203 U/L
CO2 SERPL-SCNC: 23 MMOL/L
CREAT SERPL-MCNC: 0.41 MG/DL
CRP SERPL-MCNC: <3 MG/L
EGFR: 110 ML/MIN/1.73M2
EOSINOPHIL # BLD AUTO: 0.05 K/UL
EOSINOPHIL NFR BLD AUTO: 1.4 %
ERYTHROCYTE [SEDIMENTATION RATE] IN BLOOD BY WESTERGREN METHOD: 88 MM/HR
ERYTHROCYTE-BOUND C4D (EC4D) LEVEL (FC): 6
FERRITIN SERPL-MCNC: 218 NG/ML
GLUCOSE SERPL-MCNC: 172 MG/DL
HCT VFR BLD CALC: 39.2 %
HGB BLD-MCNC: 13.1 G/DL
HMGCR ANTIBODY, IGG: 123 UNITS
IMM GRANULOCYTES NFR BLD AUTO: 0.3 %
LYMPHOCYTES # BLD AUTO: 0.82 K/UL
LYMPHOCYTES NFR BLD AUTO: 22.7 %
MAN DIFF?: NORMAL
MCHC RBC-ENTMCNC: 31.5 PG
MCHC RBC-ENTMCNC: 33.4 G/DL
MCV RBC AUTO: 94.2 FL
MONOCYTES # BLD AUTO: 0.2 K/UL
MONOCYTES NFR BLD AUTO: 5.5 %
MYOGLOBIN SERPL-MCNC: 158 NG/ML
NEUTROPHILS # BLD AUTO: 2.49 K/UL
NEUTROPHILS NFR BLD AUTO: 69 %
PLATELET # BLD AUTO: 264 K/UL
POTASSIUM SERPL-SCNC: 3.9 MMOL/L
PROT SERPL-MCNC: 10 G/DL
RBC # BLD: 4.16 M/UL
RBC # FLD: 14.1 %
RHEUMATOID FACTOR (IGA) CONCENTRATION (ELFA): 3.3 IU/ML
RHEUMATOID FACTOR (IGM) CONCENTRATION (ELFA): <0.6 IU/ML
SODIUM SERPL-SCNC: 132 MMOL/L
T CELL RESULT: NORMAL
TC4D (FC): <179
TIGG (FC): 1070
TIGM (FC): <187
TPMT GENE MUT ANL BLD/T: NEGATIVE
TPMT INTERPRETATION: NORMAL
WBC # FLD AUTO: 3.61 K/UL

## 2025-03-07 ENCOUNTER — TRANSCRIPTION ENCOUNTER (OUTPATIENT)
Age: 65
End: 2025-03-07

## 2025-03-12 ENCOUNTER — TRANSCRIPTION ENCOUNTER (OUTPATIENT)
Age: 65
End: 2025-03-12

## 2025-03-12 ENCOUNTER — RX RENEWAL (OUTPATIENT)
Age: 65
End: 2025-03-12

## 2025-03-12 LAB
BASOPHILS # BLD AUTO: 0.04 K/UL
BASOPHILS NFR BLD AUTO: 0.8 %
EOSINOPHIL # BLD AUTO: 0.05 K/UL
EOSINOPHIL NFR BLD AUTO: 1 %
HCT VFR BLD CALC: 42.2 %
HGB BLD-MCNC: 14 G/DL
IMM GRANULOCYTES NFR BLD AUTO: 0.2 %
LYMPHOCYTES # BLD AUTO: 1.25 K/UL
LYMPHOCYTES NFR BLD AUTO: 26 %
MAN DIFF?: NORMAL
MCHC RBC-ENTMCNC: 31.7 PG
MCHC RBC-ENTMCNC: 33.2 G/DL
MCV RBC AUTO: 95.5 FL
MONOCYTES # BLD AUTO: 0.25 K/UL
MONOCYTES NFR BLD AUTO: 5.2 %
NEUTROPHILS # BLD AUTO: 3.21 K/UL
NEUTROPHILS NFR BLD AUTO: 66.8 %
PLATELET # BLD AUTO: 288 K/UL
RBC # BLD: 4.42 M/UL
RBC # FLD: 14.5 %
WBC # FLD AUTO: 4.81 K/UL

## 2025-03-12 RX ORDER — AZATHIOPRINE 50 1/1
50 TABLET ORAL
Qty: 60 | Refills: 1 | Status: ACTIVE | COMMUNITY
Start: 2025-03-12 | End: 1900-01-01

## 2025-03-12 NOTE — BRIEF OPERATIVE NOTE - ANTIBIOTIC PROTOCOL
.[facility] Infectious Disease Clinic Visit      Patient: Niels Dexter Date: 3/12/2025   : 1956 68 year old male       HPI: Jimob is seen today in virtual visit for hospital f/u of MRSA bacteremia, native AV endocarditis, and native septic R shoulder joint.     He is doing ok at home. Wife with him at visit. Getting therapy and making some progress. Eating well, no NVD. Having good movement in BLE, notices some swelling in BLE at times but intermittent. No issues with PICC or IV abx.    Discussed f/u plan;  - monthly labs  - IV Vanco thru 3/19 followed by melissa PO Clinda suppression. Brie Corey and Rubin   - Discussed eating yogurt and probiotic.   - Home RN can remove PICC at [facility]  - ONELIA ordered. Has f/u with Cardiology early summer at [facility]    Problem List:   Patient Active Problem List   Diagnosis    Left leg cellulitis    Diabetic ulcer of toe of left foot associated with type 2 diabetes mellitus, with fat layer exposed  (CMD)    Chronic atrial fibrillation with RVR  (CMD)    Arthritis of left knee    Sepsis  (CMD)    Lumbar back pain    Gallstones, common bile duct    Chronic heart failure with preserved ejection fraction (HFpEF)  (CMD)    Left knee pain    Acute pain of left knee    Infection and inflammatory reaction due to other internal joint prosthesis, initial encounter (CMD)    Presence of unspecified artificial knee joint    Cellulitis of left leg    Infection of total left knee replacement (CMD)    Acquired absence of knee joint following explantation of joint prosthesis with presence of antibiotic-impregnated cement spacer    Gait abnormality    Debility    Acute renal insufficiency    EZIO (acute kidney injury) (CMD)    Uremia    Toxic metabolic encephalopathy    Pulmonary hypertension  (CMD)    Morbid obesity with BMI of 40.0-44.9, adult  (CMD)    CKD (chronic kidney disease) stage 3, GFR 30-59 ml/min  (CMD)    History of septic arthritis    Right shoulder pain     Deltoid bursitis, right    Bacteremia due to methicillin resistant Staphylococcus aureus         Allergies:   ALLERGIES:   Allergen Reactions    Lisinopril Other (See Comments)     Per patient gave him kidney stones         Laboratory Results: Last 24 hour labs were reviewed in detail.  No results found for: \"PAB\"     Albumin (g/dL)   Date Value   02/17/2025 2.4 (L)   02/04/2025 1.3 (L)   02/02/2025 1.6 (L)      @[id number](GLUCOSE BEDSIDE:3)@     Lab Results   Component Value Date    WBC 9.0 02/17/2025    GLUCOSE 93 02/17/2025    HGBA1C 5.9 (H) 02/02/2025    CRP 5.02 (H) 02/17/2025    RESR 99 (H) 02/17/2025    CREATININE 0.97 02/17/2025    GFRA 85 11/22/2019    GFRNA 85 02/17/2025        @LABRCNTIP(wbc:3,hct:3,plt:3,RESR:3,CRP:3,AST:3,GPT:3)@ @LABRCNTIP(creatinine:3,PCT:3,LACTA:3, VANCT:3)@     Urinalysis:  @LABRCNTIP(uspg:3,uph:3,urob:3,uwbc:3,ubactr:3,unitr:3,LEUK:3,urbc:3,uwbc:3)@     Culture results:  Specimen Description (no units)   Date Value   01/24/2018 SWAB TOE   06/19/2017 SWAB TOE   05/26/2017 SWAB TOE LEFT LATERAL GREAT TOE DEEP WOUND   05/25/2017 SWAB TOE LEFT BOTTOM SURFACE GREAT TOE      CULTURE (no units)   Date Value   01/24/2018 MODERATE MIXED ABA CONSISTING OF:   01/24/2018 3 TYPE(S) OF GRAM POSITIVE ORGANISM(S) (P)   01/24/2018 1 TYPE(S) OF GRAM NEGATIVE ORGANISM(S) (P)   01/24/2018     No Staphylococcus aureus, Group A Streptococcus, Vancomycin Resistant Enterococci or Pseudomonas aeruginosa isolated.       Microbiology:      Specimen Description (no units)   Date Value   01/24/2018 SWAB TOE   06/19/2017 SWAB TOE   05/26/2017 SWAB TOE LEFT LATERAL GREAT TOE DEEP WOUND   05/25/2017 SWAB TOE LEFT BOTTOM SURFACE GREAT TOE       CULTURE (no units)   Date Value   01/24/2018 MODERATE MIXED ABA CONSISTING OF:   01/24/2018 3 TYPE(S) OF GRAM POSITIVE ORGANISM(S) (P)   01/24/2018 1 TYPE(S) OF GRAM NEGATIVE ORGANISM(S) (P)   01/24/2018     No Staphylococcus aureus, Group A Streptococcus, Vancomycin  Resistant Enterococci or Pseudomonas aeruginosa isolated.          Medications/Infusions: Reviewed    Imaging: Reviewed      VITAL SIGNS:  Vital Last Value 24 Hour Range   Temperature   @FLOWSTAT(6:24::1)@   Pulse   @FLOWSTAT(8:24::1)@   Respiratory   @FLOWSTAT(9:24::1)@   Non-Invasive  Blood Pressure   @FLOWSTAT(5:24::1)@   Pulse Oximetry   @FLOWSTAT(10:24::1)@     Vital Today Admitted   Weight       Height N/A     BMI N/A           Review of Systems:   No fever or chills   No cough or shortness of breath  No chest pain or palpitations  No diarrhea, nausea or vomiting   No dysuria or frequency  No rash      Physical Exam:   General:  In chair, no distress, interactive and appropriate  HEENT: Normocephalic, intact pink MMM, hearing intact  Cardiac: RRR  Respiratory: RA, normal effort, lungs clear, no cough or wheeze  MSK: moves all extremities  Neuro: A&Ox3        Assessment/Plan    Right Shoulder Pain, Native Septic Joint s/p I/D  MRSA, MDRO  - MRI with fluid, IR Aspiration 2/4/25             - Clx: MRSA  - 2/6/25 Dr Manrique: R shoulder I/D (purulence in subacromial, subdeltoid space, communicating with glenohumeral joint through rotator cuff tear)             - Clx: MRSA (Doxy R)   - Further long term suppression with Clinda   - Referral placed to Dr Manrique for f/u   - Vanco             - Will need 6 week course for septic joint thru 3/19/25  - PICC can be removed at [facility], orders placed, home care RN notified     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   - Repeat ONELIA ordered for 5/1/25  - Follows outpt Cardiology at [facility] appt in 6/2025     MRSA Bacteremia  - Blood clx              2/1: 2/2 with with MRSA (Doxy R)             2/2: NGF              2/3 :NGF   - Source: SSTI, R shoulder seeding suspected.      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 Was placed on Cefuroxime long term suppression  - WBC Scan done in 3/2024 for pain was suspicious for PJI. Completed 6 week of IV Vanco and Ceftriaxone in 4/2024. Placed on Minocycline for suppression  - 6/11/24 Dr Andino: L knee explant to spacer. Intra-op clx: NGF. Completed 6 weeks IV Vanco   - 8/27/24 Dr Andino: spacer explant, revision arthroplasty             - Clx: NGF  - Was on Doxy lifelong suppression PTA, now on hold  - MRSA in blood above is Doxy R    CKD 3    Left Chest and Scalp Skin Cancer  - Follows Derm and ENT     DMT2 with Neuropathy      Obesity    Hx Cholecystectomy         See again in 3-4 months, sooner if needed      Sue Daigle NP  Infectious Disease           2g ancef

## 2025-03-14 ENCOUNTER — TRANSCRIPTION ENCOUNTER (OUTPATIENT)
Age: 65
End: 2025-03-14

## 2025-03-21 ENCOUNTER — TRANSCRIPTION ENCOUNTER (OUTPATIENT)
Age: 65
End: 2025-03-21

## 2025-03-24 ENCOUNTER — TRANSCRIPTION ENCOUNTER (OUTPATIENT)
Age: 65
End: 2025-03-24

## 2025-03-27 ENCOUNTER — TRANSCRIPTION ENCOUNTER (OUTPATIENT)
Age: 65
End: 2025-03-27

## 2025-04-04 ENCOUNTER — INPATIENT (INPATIENT)
Facility: HOSPITAL | Age: 65
LOS: 1 days | Discharge: HOME | DRG: 556 | End: 2025-04-06
Attending: INTERNAL MEDICINE | Admitting: INTERNAL MEDICINE
Payer: MEDICARE

## 2025-04-04 ENCOUNTER — NON-APPOINTMENT (OUTPATIENT)
Age: 65
End: 2025-04-04

## 2025-04-04 VITALS
HEIGHT: 62 IN | WEIGHT: 134.92 LBS | OXYGEN SATURATION: 96 % | TEMPERATURE: 98 F | RESPIRATION RATE: 20 BRPM | DIASTOLIC BLOOD PRESSURE: 86 MMHG | HEART RATE: 93 BPM | SYSTOLIC BLOOD PRESSURE: 151 MMHG

## 2025-04-04 DIAGNOSIS — M25.50 PAIN IN UNSPECIFIED JOINT: ICD-10-CM

## 2025-04-04 LAB
ALBUMIN SERPL ELPH-MCNC: 3.8 G/DL — SIGNIFICANT CHANGE UP (ref 3.3–5)
ALP SERPL-CCNC: 57 U/L — SIGNIFICANT CHANGE UP (ref 40–120)
ALT FLD-CCNC: 28 U/L — SIGNIFICANT CHANGE UP (ref 10–45)
ANION GAP SERPL CALC-SCNC: 14 MMOL/L — SIGNIFICANT CHANGE UP (ref 5–17)
APPEARANCE UR: CLEAR — SIGNIFICANT CHANGE UP
AST SERPL-CCNC: 26 U/L — SIGNIFICANT CHANGE UP (ref 10–40)
B-OH-BUTYR SERPL-SCNC: 0.5 MMOL/L — HIGH
BACTERIA # UR AUTO: NEGATIVE /HPF — SIGNIFICANT CHANGE UP
BASOPHILS # BLD AUTO: 0.03 K/UL — SIGNIFICANT CHANGE UP (ref 0–0.2)
BASOPHILS NFR BLD AUTO: 0.3 % — SIGNIFICANT CHANGE UP (ref 0–2)
BILIRUB SERPL-MCNC: 0.8 MG/DL — SIGNIFICANT CHANGE UP (ref 0.2–1.2)
BILIRUB UR-MCNC: NEGATIVE — SIGNIFICANT CHANGE UP
BUN SERPL-MCNC: 6 MG/DL — LOW (ref 7–23)
CALCIUM SERPL-MCNC: 8.6 MG/DL — SIGNIFICANT CHANGE UP (ref 8.4–10.5)
CAST: 0 /LPF — SIGNIFICANT CHANGE UP (ref 0–4)
CHLORIDE SERPL-SCNC: 93 MMOL/L — LOW (ref 96–108)
CO2 SERPL-SCNC: 20 MMOL/L — LOW (ref 22–31)
COLOR SPEC: YELLOW — SIGNIFICANT CHANGE UP
CREAT SERPL-MCNC: 0.42 MG/DL — LOW (ref 0.5–1.3)
DIFF PNL FLD: NEGATIVE — SIGNIFICANT CHANGE UP
EGFR: 109 ML/MIN/1.73M2 — SIGNIFICANT CHANGE UP
EGFR: 109 ML/MIN/1.73M2 — SIGNIFICANT CHANGE UP
EOSINOPHIL # BLD AUTO: 0.09 K/UL — SIGNIFICANT CHANGE UP (ref 0–0.5)
EOSINOPHIL NFR BLD AUTO: 1 % — SIGNIFICANT CHANGE UP (ref 0–6)
GAS PNL BLDV: SIGNIFICANT CHANGE UP
GLUCOSE BLDC GLUCOMTR-MCNC: 135 MG/DL — HIGH (ref 70–99)
GLUCOSE SERPL-MCNC: 198 MG/DL — HIGH (ref 70–99)
GLUCOSE UR QL: NEGATIVE MG/DL — SIGNIFICANT CHANGE UP
HCT VFR BLD CALC: 32.6 % — LOW (ref 34.5–45)
HGB BLD-MCNC: 11.6 G/DL — SIGNIFICANT CHANGE UP (ref 11.5–15.5)
IMM GRANULOCYTES NFR BLD AUTO: 0.3 % — SIGNIFICANT CHANGE UP (ref 0–0.9)
KETONES UR-MCNC: NEGATIVE MG/DL — SIGNIFICANT CHANGE UP
LEUKOCYTE ESTERASE UR-ACNC: ABNORMAL
LYMPHOCYTES # BLD AUTO: 0.87 K/UL — LOW (ref 1–3.3)
LYMPHOCYTES # BLD AUTO: 10.1 % — LOW (ref 13–44)
MCHC RBC-ENTMCNC: 32 PG — SIGNIFICANT CHANGE UP (ref 27–34)
MCHC RBC-ENTMCNC: 35.6 G/DL — SIGNIFICANT CHANGE UP (ref 32–36)
MCV RBC AUTO: 89.8 FL — SIGNIFICANT CHANGE UP (ref 80–100)
MONOCYTES # BLD AUTO: 0.33 K/UL — SIGNIFICANT CHANGE UP (ref 0–0.9)
MONOCYTES NFR BLD AUTO: 3.8 % — SIGNIFICANT CHANGE UP (ref 2–14)
NEUTROPHILS # BLD AUTO: 7.25 K/UL — SIGNIFICANT CHANGE UP (ref 1.8–7.4)
NEUTROPHILS NFR BLD AUTO: 84.5 % — HIGH (ref 43–77)
NITRITE UR-MCNC: NEGATIVE — SIGNIFICANT CHANGE UP
NRBC BLD AUTO-RTO: 0 /100 WBCS — SIGNIFICANT CHANGE UP (ref 0–0)
PH UR: 7 — SIGNIFICANT CHANGE UP (ref 5–8)
PLATELET # BLD AUTO: 248 K/UL — SIGNIFICANT CHANGE UP (ref 150–400)
POTASSIUM SERPL-MCNC: 3.6 MMOL/L — SIGNIFICANT CHANGE UP (ref 3.5–5.3)
POTASSIUM SERPL-SCNC: 3.6 MMOL/L — SIGNIFICANT CHANGE UP (ref 3.5–5.3)
PROT SERPL-MCNC: 7.6 G/DL — SIGNIFICANT CHANGE UP (ref 6–8.3)
PROT UR-MCNC: NEGATIVE MG/DL — SIGNIFICANT CHANGE UP
RBC # BLD: 3.63 M/UL — LOW (ref 3.8–5.2)
RBC # FLD: 13.9 % — SIGNIFICANT CHANGE UP (ref 10.3–14.5)
RBC CASTS # UR COMP ASSIST: 0 /HPF — SIGNIFICANT CHANGE UP (ref 0–4)
REVIEW: SIGNIFICANT CHANGE UP
SODIUM SERPL-SCNC: 127 MMOL/L — LOW (ref 135–145)
SP GR SPEC: <1.005 — LOW (ref 1–1.03)
SQUAMOUS # UR AUTO: 0 /HPF — SIGNIFICANT CHANGE UP (ref 0–5)
UROBILINOGEN FLD QL: 0.2 MG/DL — SIGNIFICANT CHANGE UP (ref 0.2–1)
WBC # BLD: 8.6 K/UL — SIGNIFICANT CHANGE UP (ref 3.8–10.5)
WBC # FLD AUTO: 8.6 K/UL — SIGNIFICANT CHANGE UP (ref 3.8–10.5)
WBC UR QL: 2 /HPF — SIGNIFICANT CHANGE UP (ref 0–5)

## 2025-04-04 PROCEDURE — 99221 1ST HOSP IP/OBS SF/LOW 40: CPT

## 2025-04-04 PROCEDURE — 99285 EMERGENCY DEPT VISIT HI MDM: CPT | Mod: FS

## 2025-04-04 RX ORDER — SODIUM CHLORIDE 9 G/1000ML
1000 INJECTION, SOLUTION INTRAVENOUS
Refills: 0 | Status: DISCONTINUED | OUTPATIENT
Start: 2025-04-04 | End: 2025-04-06

## 2025-04-04 RX ORDER — BRINZOLAMIDE 10 MG/ML
1 SUSPENSION/ DROPS OPHTHALMIC
Refills: 0 | DISCHARGE

## 2025-04-04 RX ORDER — METFORMIN HYDROCHLORIDE 850 MG/1
1 TABLET ORAL
Refills: 0 | DISCHARGE

## 2025-04-04 RX ORDER — ACETAMINOPHEN 500 MG/5ML
650 LIQUID (ML) ORAL EVERY 6 HOURS
Refills: 0 | Status: DISCONTINUED | OUTPATIENT
Start: 2025-04-04 | End: 2025-04-06

## 2025-04-04 RX ORDER — DORZOLAMIDE 20 MG/ML
1 SOLUTION/ DROPS OPHTHALMIC THREE TIMES A DAY
Refills: 0 | Status: DISCONTINUED | OUTPATIENT
Start: 2025-04-04 | End: 2025-04-06

## 2025-04-04 RX ORDER — LATANOPROST PF 0.05 MG/ML
1 SOLUTION/ DROPS OPHTHALMIC AT BEDTIME
Refills: 0 | Status: DISCONTINUED | OUTPATIENT
Start: 2025-04-04 | End: 2025-04-06

## 2025-04-04 RX ORDER — GLUCAGON 3 MG/1
1 POWDER NASAL ONCE
Refills: 0 | Status: DISCONTINUED | OUTPATIENT
Start: 2025-04-04 | End: 2025-04-06

## 2025-04-04 RX ORDER — NATEGLINIDE 60 MG/1
1 TABLET ORAL
Refills: 0 | DISCHARGE

## 2025-04-04 RX ORDER — TIMOLOL MALEATE 6.8 MG/ML
1 SOLUTION OPHTHALMIC DAILY
Refills: 0 | Status: DISCONTINUED | OUTPATIENT
Start: 2025-04-04 | End: 2025-04-06

## 2025-04-04 RX ORDER — ACETAMINOPHEN 500 MG/5ML
1000 LIQUID (ML) ORAL ONCE
Refills: 0 | Status: COMPLETED | OUTPATIENT
Start: 2025-04-04 | End: 2025-04-04

## 2025-04-04 RX ORDER — DEXTROSE 50 % IN WATER 50 %
15 SYRINGE (ML) INTRAVENOUS ONCE
Refills: 0 | Status: DISCONTINUED | OUTPATIENT
Start: 2025-04-04 | End: 2025-04-06

## 2025-04-04 RX ORDER — DEXTROSE 50 % IN WATER 50 %
25 SYRINGE (ML) INTRAVENOUS ONCE
Refills: 0 | Status: DISCONTINUED | OUTPATIENT
Start: 2025-04-04 | End: 2025-04-06

## 2025-04-04 RX ORDER — INSULIN LISPRO 100 U/ML
INJECTION, SOLUTION INTRAVENOUS; SUBCUTANEOUS
Refills: 0 | Status: DISCONTINUED | OUTPATIENT
Start: 2025-04-04 | End: 2025-04-06

## 2025-04-04 RX ORDER — CELECOXIB 50 MG/1
200 CAPSULE ORAL DAILY
Refills: 0 | Status: DISCONTINUED | OUTPATIENT
Start: 2025-04-04 | End: 2025-04-06

## 2025-04-04 RX ORDER — INSULIN LISPRO 100 U/ML
INJECTION, SOLUTION INTRAVENOUS; SUBCUTANEOUS AT BEDTIME
Refills: 0 | Status: DISCONTINUED | OUTPATIENT
Start: 2025-04-04 | End: 2025-04-06

## 2025-04-04 RX ORDER — PREDNISONE 20 MG/1
7.5 TABLET ORAL DAILY
Refills: 0 | Status: DISCONTINUED | OUTPATIENT
Start: 2025-04-04 | End: 2025-04-06

## 2025-04-04 RX ORDER — LOSARTAN POTASSIUM 100 MG/1
25 TABLET, FILM COATED ORAL DAILY
Refills: 0 | Status: DISCONTINUED | OUTPATIENT
Start: 2025-04-04 | End: 2025-04-06

## 2025-04-04 RX ORDER — DEXTROSE 50 % IN WATER 50 %
12.5 SYRINGE (ML) INTRAVENOUS ONCE
Refills: 0 | Status: DISCONTINUED | OUTPATIENT
Start: 2025-04-04 | End: 2025-04-06

## 2025-04-04 RX ORDER — AZATHIOPRINE 50 MG/1
50 TABLET ORAL DAILY
Refills: 0 | Status: DISCONTINUED | OUTPATIENT
Start: 2025-04-04 | End: 2025-04-06

## 2025-04-04 RX ADMIN — Medication 1000 MILLILITER(S): at 13:56

## 2025-04-04 RX ADMIN — LATANOPROST PF 1 DROP(S): 0.05 SOLUTION/ DROPS OPHTHALMIC at 22:18

## 2025-04-04 RX ADMIN — DORZOLAMIDE 1 DROP(S): 20 SOLUTION/ DROPS OPHTHALMIC at 22:18

## 2025-04-04 NOTE — CONSULT NOTE ADULT - SUBJECTIVE AND OBJECTIVE BOX
ISHAAN ARTHUR  [id number]    HISTORY OF PRESENT ILLNESS: 63yo F with PMH of HTN, HLD, DMT2 on metformin, necrotizing myositis (dx 2020, induction therapy with high dose prednisone and IVIG) currently on prednisone 7.5mg daily and azathioprine) presenting with elevated blood sugars, b/l knee pain and b/l mild shoulder pain, and mid to lower back "heaviness" x 4 days. Rheumatology consulted for rule out myositis flair. Patient reports mid to lower back pain described as "water bags" in her spine, bilateral medial knee pain and right shoulder pain for the past four days. Patient states the pain had no inciting factor, worsens at night and denies any trauma or obvious swelling. Patient states this feels different from her prior myositis flairs previously described as weakness, whereas current symptoms are more related to pain. Patient states she has been complaint with her azathioprine and prednisone per outpatient rheum recommendations. Patient otherwise denies any fevers, chills, trauma, sore throat, chest pain, trouble breathing, urinary changes, alopecia, ulcers in mouth, urinary changes. Patient reports dry eyes/mouth and intermittent diarrhea for the past month.     Rheum ROS    Denies except as described above     MEDICATIONS  (STANDING):    MEDICATIONS  (PRN):      Allergies    No Known Allergies    Intolerances        PERTINENT MEDICATION HISTORY:    SOCIAL HISTORY: Denies smoking   TRAVEL HISTORY: No recent travel     FAMILY HISTORY:  No pertinent family history in first degree relatives    Vital Signs Last 24 Hrs  T(C): 36.9 (04 Apr 2025 17:28), Max: 36.9 (04 Apr 2025 17:28)  T(F): 98.4 (04 Apr 2025 17:28), Max: 98.4 (04 Apr 2025 17:28)  HR: 74 (04 Apr 2025 17:28) (74 - 93)  BP: 132/79 (04 Apr 2025 17:28) (132/79 - 151/86)  BP(mean): --  RR: 16 (04 Apr 2025 17:28) (16 - 20)  SpO2: 99% (04 Apr 2025 17:28) (96% - 99%)    Parameters below as of 04 Apr 2025 17:28  Patient On (Oxygen Delivery Method): room air    ROS as noted above     Physical Exam:  General: No apparent distress  HEENT: EOMI, MMM  CVS: +S1/S2, RRR, no murmurs/rubs/gallops  Resp: CTA b/l. No crackles/wheezing  GI: Soft, NT/ND +BS  MSK: Mild tenderness to palpation of R shoulder and R pec major muscles. 5/5 strength to bilateral upper extremities. Tenderness to palpation of medial aspects of bilateral knees. R knee with small effusion palpated. Mild tenderness to palpation of bilateral ankles. 4+/5 strength to hip flexors. 5/5 strength to knee flexion and extension, dorsiflexion and plantar flexion bilaterally.   Neuro: AAOx3  Skin: no visible rashes    LABS:                        11.6   8.60  )-----------( 248      ( 04 Apr 2025 13:58 )             32.6     04-04    127[L]  |  93[L]  |  6[L]  ----------------------------<  198[H]  3.6   |  20[L]  |  0.42[L]    Ca    8.6      04 Apr 2025 13:58    TPro  7.6  /  Alb  3.8  /  TBili  0.8  /  DBili  x   /  AST  26  /  ALT  28  /  AlkPhos  57  04-04      Urinalysis Basic - ( 04 Apr 2025 15:32 )    Color: Yellow / Appearance: Clear / SG: <1.005 / pH: x  Gluc: x / Ketone: Negative mg/dL  / Bili: Negative / Urobili: 0.2 mg/dL   Blood: x / Protein: Negative mg/dL / Nitrite: Negative   Leuk Esterase: Trace / RBC: 0 /HPF / WBC 2 /HPF   Sq Epi: x / Non Sq Epi: 0 /HPF / Bacteria: Negative /HPF            Rheumatology Work Up    Creatine Kinase, Serum: 5818 U/L *H* [25 - 170] (08-19-20 @ 07:01)  Creatine Kinase, Serum: 7310 U/L *H* [25 - 170] (08-18-20 @ 04:57)  Creatine Kinase, Serum: 7806 U/L *H* [25 - 170] (08-17-20 @ 06:35)  Creatine Kinase, Serum: 9138 U/L *H* [25 - 170] (08-16-20 @ 09:37)            RADIOLOGY & ADDITIONAL STUDIES:     ISHAAN ARTHUR  [id number]    HISTORY OF PRESENT ILLNESS: 63yo F with PMH of HTN, HLD, DMT2 on metformin, necrotizing myositis (dx 2020, induction therapy with high dose prednisone and IVIG) currently on prednisone 7.5mg daily and azathioprine) presenting with elevated blood sugars, b/l knee pain and b/l mild shoulder pain, and mid to lower back "heaviness" x 4 days. Rheumatology consulted for rule out myositis flair. Patient reports mid to lower back pain described as "water bags" in her spine, bilateral medial knee pain and right shoulder pain for the past four days. Patient states the pain had no inciting factor, worsens at night and denies any trauma or obvious swelling. Patient states this feels different from her prior myositis flairs previously described as weakness, whereas current symptoms are more related to pain. Patient states she has been complaint with her azathioprine and prednisone per outpatient rheum recommendations. Patient otherwise denies any fevers, chills, trauma, sore throat, chest pain, trouble breathing, urinary changes, alopecia, ulcers in mouth, urinary changes. Patient reports dry eyes/mouth and intermittent diarrhea for the past month. ROS notable for feeling of food getting stuck in throat during last few days, though denies any overt choking/coughing spells while eating.     Rheum ROS    Denies except as described above     MEDICATIONS  (STANDING):    MEDICATIONS  (PRN):      Allergies    No Known Allergies    Intolerances        PERTINENT MEDICATION HISTORY:    SOCIAL HISTORY: Denies smoking   TRAVEL HISTORY: No recent travel     FAMILY HISTORY:  No pertinent family history in first degree relatives    Vital Signs Last 24 Hrs  T(C): 36.9 (04 Apr 2025 17:28), Max: 36.9 (04 Apr 2025 17:28)  T(F): 98.4 (04 Apr 2025 17:28), Max: 98.4 (04 Apr 2025 17:28)  HR: 74 (04 Apr 2025 17:28) (74 - 93)  BP: 132/79 (04 Apr 2025 17:28) (132/79 - 151/86)  BP(mean): --  RR: 16 (04 Apr 2025 17:28) (16 - 20)  SpO2: 99% (04 Apr 2025 17:28) (96% - 99%)    Parameters below as of 04 Apr 2025 17:28  Patient On (Oxygen Delivery Method): room air    ROS as noted above     Physical Exam:  General: No apparent distress  HEENT: EOMI, MMM  CVS: +S1/S2, RRR, no murmurs/rubs/gallops  Resp: CTA b/l. No crackles/wheezing  GI: Soft, NT/ND +BS  MSK: Mild tenderness to palpation of R shoulder and R pec major muscles. 5/5 strength to bilateral upper extremities. Tenderness to palpation of medial aspects of bilateral knees. R knee with small effusion palpated. Mild tenderness to palpation of bilateral ankles. 4+/5 strength to hip flexors. 5/5 strength to knee flexion and extension, dorsiflexion and plantar flexion bilaterally.   Neuro: AAOx3  Skin: no visible rashes    LABS:                        11.6   8.60  )-----------( 248      ( 04 Apr 2025 13:58 )             32.6     04-04    127[L]  |  93[L]  |  6[L]  ----------------------------<  198[H]  3.6   |  20[L]  |  0.42[L]    Ca    8.6      04 Apr 2025 13:58    TPro  7.6  /  Alb  3.8  /  TBili  0.8  /  DBili  x   /  AST  26  /  ALT  28  /  AlkPhos  57  04-04      Urinalysis Basic - ( 04 Apr 2025 15:32 )    Color: Yellow / Appearance: Clear / SG: <1.005 / pH: x  Gluc: x / Ketone: Negative mg/dL  / Bili: Negative / Urobili: 0.2 mg/dL   Blood: x / Protein: Negative mg/dL / Nitrite: Negative   Leuk Esterase: Trace / RBC: 0 /HPF / WBC 2 /HPF   Sq Epi: x / Non Sq Epi: 0 /HPF / Bacteria: Negative /HPF            Rheumatology Work Up    Creatine Kinase, Serum: 5818 U/L *H* [25 - 170] (08-19-20 @ 07:01)  Creatine Kinase, Serum: 7310 U/L *H* [25 - 170] (08-18-20 @ 04:57)  Creatine Kinase, Serum: 7806 U/L *H* [25 - 170] (08-17-20 @ 06:35)  Creatine Kinase, Serum: 9138 U/L *H* [25 - 170] (08-16-20 @ 09:37)            RADIOLOGY & ADDITIONAL STUDIES:  < from: Xray Hip 2-3 Views, Right (01.06.25 @ 15:26) >    FINDING: Two views of the right hip demonstrates mild productive change   and sclerosis at the superior hip joint. There is narrowing and mild   productive change the pubic symphysis. Mild to moderate productive change   of mild sclerosis of the right sacroiliac joint.    IMPRESSION:  Degenerative changes as above    --- End of Report ---    < end of copied text >     ISHAAN ARTHUR  [id number]    HISTORY OF PRESENT ILLNESS: 65yo F with PMH of HTN, HLD, DMT2 on metformin, necrotizing myositis (dx 2020, induction therapy with high dose prednisone and IVIG) currently on prednisone 7.5mg daily and azathioprine) presenting with elevated blood sugars, b/l knee pain and b/l mild shoulder pain, and mid to lower back "heaviness" x 4 days. Rheumatology consulted for rule out myositis flare. Patient reports mid to lower back pain described as "water bags" in her spine, bilateral medial knee pain and right shoulder pain for the past four days. Patient states the pain had no inciting factor, worsens at night and denies any trauma or obvious swelling. Patient states this feels different from her prior myositis flairs previously described as weakness, whereas current symptoms are more related to pain. Patient states she has been complaint with her azathioprine and prednisone per outpatient rheum recommendations. Patient otherwise denies any fevers, chills, trauma, sore throat, chest pain, trouble breathing, urinary changes, alopecia, ulcers in mouth, urinary changes. Patient reports dry eyes/mouth and intermittent diarrhea for the past month. ROS notable for feeling of food getting stuck in throat during last few days, though denies any overt choking/coughing spells while eating.     Rheum ROS    Denies except as described above     Follows with Dr. Villeda outpatient, dx with immune mediated necrotizing myositis, currently on prednisone 7.5 mg daily and AZA 50 mg daily   Prior was on MMF, tacrolimus, and RTX   Also currently receiving IVIG - last doses 3/17 and 3/18     MEDICATIONS  (STANDING):    MEDICATIONS  (PRN):      Allergies    No Known Allergies    Intolerances        PERTINENT MEDICATION HISTORY: refer to H&P     SOCIAL HISTORY: Denies smoking   TRAVEL HISTORY: No recent travel     FAMILY HISTORY:  No pertinent family history in first degree relatives    Vital Signs Last 24 Hrs  T(C): 36.9 (04 Apr 2025 17:28), Max: 36.9 (04 Apr 2025 17:28)  T(F): 98.4 (04 Apr 2025 17:28), Max: 98.4 (04 Apr 2025 17:28)  HR: 74 (04 Apr 2025 17:28) (74 - 93)  BP: 132/79 (04 Apr 2025 17:28) (132/79 - 151/86)  BP(mean): --  RR: 16 (04 Apr 2025 17:28) (16 - 20)  SpO2: 99% (04 Apr 2025 17:28) (96% - 99%)    Parameters below as of 04 Apr 2025 17:28  Patient On (Oxygen Delivery Method): room air    ROS as noted above     Physical Exam:  General: No apparent distress, alert, comfortable   HEENT: EOMI, MMM  CVS: +S1/S2, RRR, no murmurs/rubs/gallops  Resp: CTA b/l. No crackles/wheezing  GI: Soft, NT/ND +BS  MSK: Mild tenderness to palpation of R shoulder and R pec major muscles. 5/5 strength to bilateral upper extremities. Tenderness to palpation of medial aspects of bilateral knees. R knee with trace cool effusion palpated. Mild tenderness to palpation of bilateral ankles. 4+/5 strength to hip flexors. 5/5 strength to knee flexion and extension, dorsiflexion and plantar flexion bilaterally. 5/5 strength UEs   Neuro: AAOx3  Skin: no visible rashes    LABS:                        11.6   8.60  )-----------( 248      ( 04 Apr 2025 13:58 )             32.6     04-04    127[L]  |  93[L]  |  6[L]  ----------------------------<  198[H]  3.6   |  20[L]  |  0.42[L]    Ca    8.6      04 Apr 2025 13:58    TPro  7.6  /  Alb  3.8  /  TBili  0.8  /  DBili  x   /  AST  26  /  ALT  28  /  AlkPhos  57  04-04      Urinalysis Basic - ( 04 Apr 2025 15:32 )    Color: Yellow / Appearance: Clear / SG: <1.005 / pH: x  Gluc: x / Ketone: Negative mg/dL  / Bili: Negative / Urobili: 0.2 mg/dL   Blood: x / Protein: Negative mg/dL / Nitrite: Negative   Leuk Esterase: Trace / RBC: 0 /HPF / WBC 2 /HPF   Sq Epi: x / Non Sq Epi: 0 /HPF / Bacteria: Negative /HPF            Rheumatology Work Up    Creatine Kinase, Serum: 5818 U/L *H* [25 - 170] (08-19-20 @ 07:01)  Creatine Kinase, Serum: 7310 U/L *H* [25 - 170] (08-18-20 @ 04:57)  Creatine Kinase, Serum: 7806 U/L *H* [25 - 170] (08-17-20 @ 06:35)  Creatine Kinase, Serum: 9138 U/L *H* [25 - 170] (08-16-20 @ 09:37)            RADIOLOGY & ADDITIONAL STUDIES:  < from: Xray Hip 2-3 Views, Right (01.06.25 @ 15:26) >    FINDING: Two views of the right hip demonstrates mild productive change   and sclerosis at the superior hip joint. There is narrowing and mild   productive change the pubic symphysis. Mild to moderate productive change   of mild sclerosis of the right sacroiliac joint.    IMPRESSION:  Degenerative changes as above    --- End of Report ---    < end of copied text >     ISHAAN ARTHUR  [id number]    HISTORY OF PRESENT ILLNESS: 65yo F with PMH of HTN, HLD, DMT2 on metformin, necrotizing myositis (dx 2020, induction therapy with high dose prednisone and IVIG) currently on prednisone 7.5mg daily and azathioprine) presenting with elevated blood sugars, b/l knee pain and b/l mild shoulder pain, and mid to lower back "heaviness" x 4 days. Rheumatology consulted for rule out myositis flare. Patient reports mid to lower back pain described as "water bags" in her spine, bilateral medial knee pain and right shoulder pain for the past four days. Patient states the pain had no inciting factor, worsens at night and denies any trauma or obvious swelling. Patient states this feels different from her prior myositis flairs previously described as weakness, whereas current symptoms are more related to pain. Patient states she has been complaint with her azathioprine and prednisone per outpatient rheum recommendations. Patient otherwise denies any fevers, chills, trauma, sore throat, chest pain, trouble breathing, urinary changes, alopecia, ulcers in mouth, urinary changes. Patient reports dry eyes/mouth and intermittent diarrhea for the past month. ROS notable for feeling of food getting stuck in throat during last few days, though denies any overt choking/coughing spells while eating.     Rheum ROS    Denies except as described above     Follows with Dr. Villeda outpatient, dx with immune mediated necrotizing myositis, currently on prednisone 7.5 mg daily and AZA 50 mg daily   Prior was on MMF, tacrolimus, and RTX   Also currently receiving IVIG - last doses 3/17 and 3/18     MEDICATIONS  (STANDING):    MEDICATIONS  (PRN):      Allergies    No Known Allergies    Intolerances        PERTINENT MEDICATION HISTORY: refer to H&P     SOCIAL HISTORY: Denies smoking   TRAVEL HISTORY: No recent travel     FAMILY HISTORY:  No pertinent family history in first degree relatives    Vital Signs Last 24 Hrs  T(C): 36.9 (04 Apr 2025 17:28), Max: 36.9 (04 Apr 2025 17:28)  T(F): 98.4 (04 Apr 2025 17:28), Max: 98.4 (04 Apr 2025 17:28)  HR: 74 (04 Apr 2025 17:28) (74 - 93)  BP: 132/79 (04 Apr 2025 17:28) (132/79 - 151/86)  BP(mean): --  RR: 16 (04 Apr 2025 17:28) (16 - 20)  SpO2: 99% (04 Apr 2025 17:28) (96% - 99%)    Parameters below as of 04 Apr 2025 17:28  Patient On (Oxygen Delivery Method): room air    ROS as noted above     Physical Exam:  General: No apparent distress, alert, comfortable   HEENT: EOMI, MMM  CVS: +S1/S2, RRR, no murmurs/rubs/gallops  Resp: CTA b/l. No crackles/wheezing  GI: Soft, NT/ND +BS  MSK: Mild tenderness to palpation of R shoulder and R pec major muscles. 5/5 strength to bilateral upper extremities. Tenderness to palpation of medial aspects of bilateral knees. R knee with trace cool effusion palpated. Mild tenderness to palpation of bilateral ankles. 4+/5 strength to hip flexors. 5/5 strength to knee flexion and extension, dorsiflexion and plantar flexion bilaterally  Neuro: AAOx3  Skin: no visible rashes    LABS:                        11.6   8.60  )-----------( 248      ( 04 Apr 2025 13:58 )             32.6     04-04    127[L]  |  93[L]  |  6[L]  ----------------------------<  198[H]  3.6   |  20[L]  |  0.42[L]    Ca    8.6      04 Apr 2025 13:58    TPro  7.6  /  Alb  3.8  /  TBili  0.8  /  DBili  x   /  AST  26  /  ALT  28  /  AlkPhos  57  04-04      Urinalysis Basic - ( 04 Apr 2025 15:32 )    Color: Yellow / Appearance: Clear / SG: <1.005 / pH: x  Gluc: x / Ketone: Negative mg/dL  / Bili: Negative / Urobili: 0.2 mg/dL   Blood: x / Protein: Negative mg/dL / Nitrite: Negative   Leuk Esterase: Trace / RBC: 0 /HPF / WBC 2 /HPF   Sq Epi: x / Non Sq Epi: 0 /HPF / Bacteria: Negative /HPF            Rheumatology Work Up    Creatine Kinase, Serum: 5818 U/L *H* [25 - 170] (08-19-20 @ 07:01)  Creatine Kinase, Serum: 7310 U/L *H* [25 - 170] (08-18-20 @ 04:57)  Creatine Kinase, Serum: 7806 U/L *H* [25 - 170] (08-17-20 @ 06:35)  Creatine Kinase, Serum: 9138 U/L *H* [25 - 170] (08-16-20 @ 09:37)            RADIOLOGY & ADDITIONAL STUDIES:  < from: Xray Hip 2-3 Views, Right (01.06.25 @ 15:26) >    FINDING: Two views of the right hip demonstrates mild productive change   and sclerosis at the superior hip joint. There is narrowing and mild   productive change the pubic symphysis. Mild to moderate productive change   of mild sclerosis of the right sacroiliac joint.    IMPRESSION:  Degenerative changes as above    --- End of Report ---    < end of copied text >     ISHAAN ARTHUR  [id number]    HISTORY OF PRESENT ILLNESS: 63yo F with PMH of HTN, HLD, DMT2 on metformin, necrotizing myositis (dx 2020, induction therapy with high dose prednisone and IVIG) currently on prednisone 7.5mg daily and azathioprine) presenting with elevated blood sugars, b/l knee pain and b/l mild shoulder pain, and mid to lower back "heaviness" x 4 days. Rheumatology consulted for rule out myositis flare. Patient reports mid to lower back pain described as "water bags" in her spine, bilateral medial knee pain and right shoulder pain for the past four days. Patient states the pain had no inciting factor, worsens at night and denies any trauma or obvious swelling. Patient states this feels different from her prior myositis flairs previously described as weakness, whereas current symptoms are more related to pain. Patient states she has been complaint with her azathioprine and prednisone per outpatient rheum recommendations. Patient otherwise denies any fevers, chills, trauma, sore throat, chest pain, trouble breathing, urinary changes, alopecia, ulcers in mouth, urinary changes. Patient reports dry eyes/mouth and intermittent diarrhea for the past month. ROS notable for feeling of food getting stuck in throat during last few days, though denies any overt choking/coughing spells while eating.     Rheum ROS    Denies except as described above     Follows with Dr. Villeda outpatient, dx with immune mediated necrotizing myositis, currently on prednisone 7.5 mg daily and AZA 50 mg daily   Prior was on MMF, tacrolimus, and RTX   Also currently receiving IVIG - last doses 3/17 and 3/18     MEDICATIONS  (STANDING):    MEDICATIONS  (PRN):      Allergies    No Known Allergies    Intolerances        PERTINENT MEDICATION HISTORY: refer to H&P     SOCIAL HISTORY: Denies smoking   TRAVEL HISTORY: No recent travel     FAMILY HISTORY:  No pertinent family history in first degree relatives    Vital Signs Last 24 Hrs  T(C): 36.9 (04 Apr 2025 17:28), Max: 36.9 (04 Apr 2025 17:28)  T(F): 98.4 (04 Apr 2025 17:28), Max: 98.4 (04 Apr 2025 17:28)  HR: 74 (04 Apr 2025 17:28) (74 - 93)  BP: 132/79 (04 Apr 2025 17:28) (132/79 - 151/86)  BP(mean): --  RR: 16 (04 Apr 2025 17:28) (16 - 20)  SpO2: 99% (04 Apr 2025 17:28) (96% - 99%)    Parameters below as of 04 Apr 2025 17:28  Patient On (Oxygen Delivery Method): room air    ROS as noted above     Physical Exam:  General: No apparent distress, alert, comfortable   HEENT: EOMI, MMM  CVS: +S1/S2, RRR, no murmurs/rubs/gallops  Resp: CTA b/l. No crackles/wheezing  GI: Soft, NT/ND +BS  MSK: Mild tenderness to palpation of R shoulder and R pec major muscles. 5/5 strength to bilateral upper extremities. Tenderness to palpation of medial aspects of bilateral knees. R knee with trace cool effusion palpated. Mild tenderness to palpation of bilateral ankles. 4+/5 strength to hip flexors. 5/5 strength to knee flexion and extension, dorsiflexion and plantar flexion bilaterally  Neuro: AAOx3  Skin: livedo changes on b/l upper extremities     LABS:                        11.6   8.60  )-----------( 248      ( 04 Apr 2025 13:58 )             32.6     04-04    127[L]  |  93[L]  |  6[L]  ----------------------------<  198[H]  3.6   |  20[L]  |  0.42[L]    Ca    8.6      04 Apr 2025 13:58    TPro  7.6  /  Alb  3.8  /  TBili  0.8  /  DBili  x   /  AST  26  /  ALT  28  /  AlkPhos  57  04-04      Urinalysis Basic - ( 04 Apr 2025 15:32 )    Color: Yellow / Appearance: Clear / SG: <1.005 / pH: x  Gluc: x / Ketone: Negative mg/dL  / Bili: Negative / Urobili: 0.2 mg/dL   Blood: x / Protein: Negative mg/dL / Nitrite: Negative   Leuk Esterase: Trace / RBC: 0 /HPF / WBC 2 /HPF   Sq Epi: x / Non Sq Epi: 0 /HPF / Bacteria: Negative /HPF            Rheumatology Work Up    Creatine Kinase, Serum: 5818 U/L *H* [25 - 170] (08-19-20 @ 07:01)  Creatine Kinase, Serum: 7310 U/L *H* [25 - 170] (08-18-20 @ 04:57)  Creatine Kinase, Serum: 7806 U/L *H* [25 - 170] (08-17-20 @ 06:35)  Creatine Kinase, Serum: 9138 U/L *H* [25 - 170] (08-16-20 @ 09:37)            RADIOLOGY & ADDITIONAL STUDIES:  < from: Xray Hip 2-3 Views, Right (01.06.25 @ 15:26) >    FINDING: Two views of the right hip demonstrates mild productive change   and sclerosis at the superior hip joint. There is narrowing and mild   productive change the pubic symphysis. Mild to moderate productive change   of mild sclerosis of the right sacroiliac joint.    IMPRESSION:  Degenerative changes as above    --- End of Report ---    < end of copied text >     ISHAAN ARTHUR  [id number]    HISTORY OF PRESENT ILLNESS: 63yo F with PMH of HTN, HLD, DMT2 on metformin, necrotizing myositis (dx 2020, induction therapy with high dose prednisone and IVIG) currently on prednisone 7.5mg daily and azathioprine) presenting with elevated blood sugars, b/l knee pain and b/l mild shoulder pain, and mid to lower back "heaviness" x 4 days. Rheumatology consulted for rule out myositis flare. Patient reports mid to lower back pain described as "water bags" in her spine, bilateral medial knee pain and right shoulder pain for the past four days. Patient states the pain had no inciting factor, worsens at night and denies any trauma or obvious swelling. Patient states this feels different from her prior myositis flairs previously described as weakness, whereas current symptoms are more related to pain. Patient states she has been complaint with her azathioprine and prednisone per outpatient rheum recommendations. Patient otherwise denies any fevers, chills, trauma, sore throat, chest pain, trouble breathing, urinary changes, alopecia, ulcers in mouth, urinary changes. Patient reports dry eyes/mouth and intermittent diarrhea for the past month. ROS notable for feeling of food getting stuck in throat during last few days, though denies any overt choking/coughing spells while eating.     Rheum ROS    Denies except as described above     Follows with Dr. Villeda outpatient, dx with immune mediated necrotizing myositis, currently on prednisone 7.5 mg daily and AZA 50 mg daily   Prior was on MMF, tacrolimus, and RTX   Also currently receiving IVIG - last doses 3/17 and 3/18     MEDICATIONS  (STANDING):    MEDICATIONS  (PRN):      Allergies    No Known Allergies    Intolerances        PERTINENT MEDICATION HISTORY: refer to H&P     SOCIAL HISTORY: Denies smoking   TRAVEL HISTORY: No recent travel     FAMILY HISTORY:  No pertinent family history in first degree relatives    Vital Signs Last 24 Hrs  T(C): 36.9 (04 Apr 2025 17:28), Max: 36.9 (04 Apr 2025 17:28)  T(F): 98.4 (04 Apr 2025 17:28), Max: 98.4 (04 Apr 2025 17:28)  HR: 74 (04 Apr 2025 17:28) (74 - 93)  BP: 132/79 (04 Apr 2025 17:28) (132/79 - 151/86)  BP(mean): --  RR: 16 (04 Apr 2025 17:28) (16 - 20)  SpO2: 99% (04 Apr 2025 17:28) (96% - 99%)    Parameters below as of 04 Apr 2025 17:28  Patient On (Oxygen Delivery Method): room air    ROS as noted above     Physical Exam:  General: No apparent distress, alert, comfortable   HEENT: EOMI, MMM  CVS: +S1/S2, RRR, no murmurs/rubs/gallops  Resp: CTA b/l. No crackles/wheezing  GI: Soft, NT/ND +BS  Back: no spinal tenderness, negative SLR   MSK: Mild tenderness to palpation of R shoulder and R pec major muscles. 5/5 strength to bilateral upper extremities. Tenderness to palpation of medial aspects of bilateral knees. R knee with trace cool effusion palpated. Mild tenderness to palpation of bilateral ankles. 4+/5 strength to hip flexors. 5/5 strength to knee flexion and extension, dorsiflexion and plantar flexion bilaterally. negative YAYA and FADIR testing   Neuro: AAOx3  Skin: livedo changes on b/l upper extremities     LABS:                        11.6   8.60  )-----------( 248      ( 04 Apr 2025 13:58 )             32.6     04-04    127[L]  |  93[L]  |  6[L]  ----------------------------<  198[H]  3.6   |  20[L]  |  0.42[L]    Ca    8.6      04 Apr 2025 13:58    TPro  7.6  /  Alb  3.8  /  TBili  0.8  /  DBili  x   /  AST  26  /  ALT  28  /  AlkPhos  57  04-04      Urinalysis Basic - ( 04 Apr 2025 15:32 )    Color: Yellow / Appearance: Clear / SG: <1.005 / pH: x  Gluc: x / Ketone: Negative mg/dL  / Bili: Negative / Urobili: 0.2 mg/dL   Blood: x / Protein: Negative mg/dL / Nitrite: Negative   Leuk Esterase: Trace / RBC: 0 /HPF / WBC 2 /HPF   Sq Epi: x / Non Sq Epi: 0 /HPF / Bacteria: Negative /HPF            Rheumatology Work Up    Creatine Kinase, Serum: 5818 U/L *H* [25 - 170] (08-19-20 @ 07:01)  Creatine Kinase, Serum: 7310 U/L *H* [25 - 170] (08-18-20 @ 04:57)  Creatine Kinase, Serum: 7806 U/L *H* [25 - 170] (08-17-20 @ 06:35)  Creatine Kinase, Serum: 9138 U/L *H* [25 - 170] (08-16-20 @ 09:37)            RADIOLOGY & ADDITIONAL STUDIES:  < from: Xray Hip 2-3 Views, Right (01.06.25 @ 15:26) >    FINDING: Two views of the right hip demonstrates mild productive change   and sclerosis at the superior hip joint. There is narrowing and mild   productive change the pubic symphysis. Mild to moderate productive change   of mild sclerosis of the right sacroiliac joint.    IMPRESSION:  Degenerative changes as above    --- End of Report ---    < end of copied text >     ISHAAN ARTHUR  [id number]    HISTORY OF PRESENT ILLNESS: 63yo F with PMH of HTN, HLD, DMT2 on metformin, necrotizing myositis (dx 2020, induction therapy with high dose prednisone and IVIG) currently on prednisone 7.5mg daily and azathioprine) presenting with elevated blood sugars, b/l knee pain and b/l mild shoulder pain, and mid to lower back "heaviness" x 4 days. Rheumatology consulted for rule out myositis flare. Patient reports mid to lower back pain described as "water bags" in her spine, bilateral medial knee pain and right shoulder pain for the past four days. Patient states the pain had no inciting factor, worsens at night and denies any trauma or obvious swelling. Patient states this feels different from her prior myositis flairs previously described as weakness, whereas current symptoms are more related to pain. Patient states she has been complaint with her azathioprine and prednisone per outpatient rheum recommendations. Patient otherwise denies any fevers, chills, trauma, sore throat, chest pain, trouble breathing, urinary changes, alopecia, ulcers in mouth, urinary changes. Patient reports dry eyes/mouth and intermittent diarrhea for the past month. ROS notable for feeling of food getting stuck in throat during last few days, though denies any overt choking/coughing spells while eating.     Rheum ROS    Denies except as described above     Follows with Dr. Villeda outpatient, dx with immune mediated necrotizing myositis, currently on prednisone 7.5 mg daily and AZA 50 mg daily   Prior was on MMF, tacrolimus, and RTX   Also currently receiving IVIG - last doses 3/17 and 3/18     Home Medications:  alendronate 70 mg oral tablet: 1 tab(s) orally once a week Tuesday (04 Apr 2025 17:39)  azaTHIOprine 50 mg oral tablet: 1 tab(s) orally once a day (04 Apr 2025 17:39)  brinzolamide 1% ophthalmic suspension: 1 drop(s) in each eye 2 times a day (04 Apr 2025 17:39)  cholecalciferol 25 mcg (1000 intl units) oral tablet: 1 tab(s) orally once a day (04 Apr 2025 17:39)  losartan 25 mg oral tablet: 1 tab(s) orally once a day (04 Apr 2025 17:39)  MetFORMIN (Eqv-Fortamet) 500 mg oral tablet, extended release: 1 tab(s) orally 2 times a day (04 Apr 2025 17:39)  nateglinide 60 mg oral tablet: 1 tab(s) orally every 12 hours (04 Apr 2025 17:39)  predniSONE 5 mg oral tablet: 2 tab(s) orally once a day (04 Apr 2025 17:39)  timolol maleate 0.5% ophthalmic solution: 1 drop(s) to each affected eye once a day (04 Apr 2025 17:35)  travoprost 0.004% ophthalmic solution: 1 drop(s) to each affected eye once a day (in the evening) (04 Apr 2025 17:35)      NKDA      PERTINENT MEDICATION HISTORY: refer to H&P     SOCIAL HISTORY: Denies smoking   TRAVEL HISTORY: No recent travel     FAMILY HISTORY:  No pertinent family history in first degree relatives    Vital Signs Last 24 Hrs  T(C): 36.9 (04 Apr 2025 17:28), Max: 36.9 (04 Apr 2025 17:28)  T(F): 98.4 (04 Apr 2025 17:28), Max: 98.4 (04 Apr 2025 17:28)  HR: 74 (04 Apr 2025 17:28) (74 - 93)  BP: 132/79 (04 Apr 2025 17:28) (132/79 - 151/86)  RR: 16 (04 Apr 2025 17:28) (16 - 20)  SpO2: 99% (04 Apr 2025 17:28) (96% - 99%)    Parameters below as of 04 Apr 2025 17:28  Patient On (Oxygen Delivery Method): room air    ROS as noted above     Physical Exam:  General: No apparent distress, alert, comfortable   HEENT: EOMI, MMM  CVS: +S1/S2, RRR, no murmurs/rubs/gallops  Resp: CTA b/l. No crackles/wheezing  GI: Soft, NT/ND +BS  Back: no spinal tenderness, negative SLR   MSK: Mild tenderness to palpation of R shoulder and R pec major muscles. 5/5 strength to bilateral upper extremities. Tenderness to palpation of medial aspects of bilateral knees. R knee with trace cool effusion palpated. Mild tenderness to palpation of bilateral ankles. 4+/5 strength to hip flexors. 5/5 strength to knee flexion and extension, dorsiflexion and plantar flexion bilaterally. negative YAYA and FADIR testing   Neuro: AAOx3  Skin: livedo changes on b/l upper extremities     LABS:                        11.6   8.60  )-----------( 248      ( 04 Apr 2025 13:58 )             32.6     04-04    127[L]  |  93[L]  |  6[L]  ----------------------------<  198[H]  3.6   |  20[L]  |  0.42[L]    Ca    8.6      04 Apr 2025 13:58    TPro  7.6  /  Alb  3.8  /  TBili  0.8  /  DBili  x   /  AST  26  /  ALT  28  /  AlkPhos  57  04-04      Urinalysis Basic - ( 04 Apr 2025 15:32 )    Color: Yellow / Appearance: Clear / SG: <1.005 / pH: x  Gluc: x / Ketone: Negative mg/dL  / Bili: Negative / Urobili: 0.2 mg/dL   Blood: x / Protein: Negative mg/dL / Nitrite: Negative   Leuk Esterase: Trace / RBC: 0 /HPF / WBC 2 /HPF   Sq Epi: x / Non Sq Epi: 0 /HPF / Bacteria: Negative /HPF            Rheumatology Work Up    Creatine Kinase, Serum: 5818 U/L *H* [25 - 170] (08-19-20 @ 07:01)  Creatine Kinase, Serum: 7310 U/L *H* [25 - 170] (08-18-20 @ 04:57)  Creatine Kinase, Serum: 7806 U/L *H* [25 - 170] (08-17-20 @ 06:35)  Creatine Kinase, Serum: 9138 U/L *H* [25 - 170] (08-16-20 @ 09:37)            RADIOLOGY & ADDITIONAL STUDIES:  < from: Xray Hip 2-3 Views, Right (01.06.25 @ 15:26) >    FINDING: Two views of the right hip demonstrates mild productive change   and sclerosis at the superior hip joint. There is narrowing and mild   productive change the pubic symphysis. Mild to moderate productive change   of mild sclerosis of the right sacroiliac joint.    IMPRESSION:  Degenerative changes as above    --- End of Report ---    < end of copied text >

## 2025-04-04 NOTE — H&P ADULT - ASSESSMENT
The patient is a 64y Female complaining of back pain.    #Acute R shoulder pain, lower back pain and bilateral knee pain  #Elevated CPK level (improved from prior)     Rheum eval appreciated  Started on Celebrex  Cont Po Prednisone  Less likely Myocarditis Flare  OOB  CPK in AM  Dw family    DM II:    FSSS

## 2025-04-04 NOTE — ED ADULT TRIAGE NOTE - CHIEF COMPLAINT QUOTE
back and bilateral knee pain for 4 days, high blood sugar, after starting on new medications Azathioprine

## 2025-04-04 NOTE — ED PROVIDER NOTE - CARE PLAN
1 Principal Discharge DX:	Multiple joint pain  Secondary Diagnosis:	Elevated creatine kinase  Secondary Diagnosis:	Hyponatremia

## 2025-04-04 NOTE — ED PROVIDER NOTE - PROGRESS NOTE DETAILS
Spoken with rheum fellow Dr. Ospina, reports team is currently rounding and will see patient in ED. - Josseline Hoang PA-C Endorsed to Dr EB Houston MD, Facep

## 2025-04-04 NOTE — CONSULT NOTE ADULT - ASSESSMENT
65yo F with PMH of HTN, HLD, DMT2 on metformin, necrotizing myositis (dx 2020, induction therapy with high dose prednisone and IVIG) currently on prednisone 7.5mg daily and azathioprine) presenting with elevated blood sugars, b/l knee pain and b/l mild shoulder pain, and mid to lower back "heaviness" x 4 days. Rheumatology consulted for rule out myositis flair.     #Acute R shoulder pain, lower back pain and bilateral knee pain  #Elevated CPK level   -Reports 4 day history of R shoulder pain, lower back pain and bilateral knee pain associated with weakness, does not feel like previous myositis flair per patient    -Workup with CBC no cytopenias, transamitis. CK overall improved from previous outpatient CK of about 1683, UA normal       Impression   Overall, patient describes pain >>> weakness on presentation. Physical exam showed 5/5 strength to bilateral upper extremities and 4+/5 strength to bilateral hip flexion. At this time, low suspicion for acute myositis flair. CK overall improved to 1177 from previous value of 1683 on 3/31/25. Symptoms seem more consistent with MSK etiology.    Recommendations:   -Recommend meloxicam/tylenol for acute MSK symptoms   -Recommend follow up with PCP or GI to evaluate dysphagia symptoms   -Recommend close outpatient followup with rheumatology provider Dr. Ronal Abarca, PGY 1   INCOMPLETE PENDING FELLOW/ATTENDING ATTESTATION - PLEASE WAIT  63yo F with PMH of HTN, HLD, DMT2 on metformin, necrotizing myositis (dx 2020, induction therapy with high dose prednisone and IVIG) currently on prednisone 7.5mg daily and azathioprine) presenting with elevated blood sugars, b/l knee pain and b/l mild shoulder pain, and mid to lower back "heaviness" x 4 days. Rheumatology consulted for rule out myositis flare.     #Acute R shoulder pain, lower back pain and bilateral knee pain  #Elevated CPK level (improved from prior)   -Reports 4 day history of R shoulder pain, lower back pain and bilateral knee pain, does not feel like prior myositis flares as per patient  -Strength overall similar to recent outpatient exams, no worsening noted    -Workup with CBC no cytopenia or transaminitis. CK 1177 overall improved from previous outpatient CK of about 1683, UA normal       Overall, patient describes primarily pain rather than weakness on presentation. Physical exam showed 5/5 strength to bilateral upper extremities and 4+/5 strength to bilateral hip flexion. At this time, low suspicion for acute myositis flare. CK overall improved to 1177 from previous value of 1683 on 3/31/25. Symptoms seem more consistent with MSK etiology     Recommendations:   -Can trial meloxicam 7.5 mg BID for pain control  -Can trial Tylenol   -C/w home prednisone 7.5 mg daily and azathioprine 50 mg daily   -Recommend outpatient follow up with PCP or GI to evaluate GI symptoms of food getting stuck   -Recommend outpatient f/u with Rheumatology Dr. Villeda after discharge   -No contraindication to discharge from rheumatologic perspective -- discussed with ED as well     Rheumatology to sign off. Please call back if any questions or concerns     Discussed with ED team  Discussed with Dr. Violet Abarca, PGY 1   Yarely Bowden MD  Rheumatology Fellow  Available on TEAMS

## 2025-04-04 NOTE — ED PROVIDER NOTE - OBJECTIVE STATEMENT
63yo F with PMH of HTN, HLD, DMT2 on metformin 63yo F with PMH of HTN, HLD, DMT2 on metformin, necrotizing myositis (dx 2020, currently on prednisone 7.5mg daily and azathioprine) presenting with elevated blood sugars, b/l knee and shoulder pain, and mid to lower back "heaviness" x 4 days. Pt reports blood sugars in 200s for the past few days, also endorsing increased thirst and urination and feeling dehydrated. Reports she follows with rheumatology and azathioprine started 2-3 weeks ago with goal to decrease prednisone use. Reports chronically elevated CK usually over 1000 and had labs this week with CK of 1600 higher than usual for her.  Pt reports worsening atraumatic b/l knee pain causing difficulty walking. Denies any fever/chills, recent falls/injury, CP, SOB, abdominal pain, n/v/d, dysuria. Takes metformin 1000mg BID for DM. 63yo F with PMH of HTN, HLD, DMT2 on metformin, necrotizing myositis (dx 2020, currently on prednisone 7.5mg daily and azathioprine) presenting with elevated blood sugars, b/l knee pain and b/l mild shoulder pain, and mid to lower back "heaviness" x 4 days. Pt reports blood sugars in 200s for the past few days, also endorsing increased thirst and urination and feeling dehydrated. Reports she follows with rheumatology and azathioprine started 2-3 weeks ago with goal to decrease prednisone use. Reports chronically elevated CK usually over 1000 and had labs this week with CK of 1600 higher than usual for her.  Pt reports worsening atraumatic b/l knee pain causing difficulty walking. Denies any fever/chills, recent falls/injury, CP, SOB, abdominal pain, n/v/d, dysuria. Takes metformin 1000mg BID for DM. 65yo F with PMH of HTN, HLD, DMT2 on metformin, necrotizing myositis (dx 2020, currently on prednisone 7.5mg daily and azathioprine) presenting with elevated blood sugars, b/l knee pain and b/l mild shoulder pain, and mid to lower back "heaviness" x 4 days. Pt reports blood sugars in 200s for the past few days, also endorsing increased thirst and urination and feeling dehydrated. Reports she follows with rheumatology and azathioprine started 2-3 weeks ago with goal to decrease prednisone use. Reports chronically elevated CK usually over 1000 and had labs this week with CK of 1600 higher than usual for her.  Pt reports worsening atraumatic b/l knee pain causing difficulty walking. Denies any fever/chills, recent falls/injury, CP, SOB, abdominal pain, n/v/d, dysuria, numbness/tingling. Takes metformin 1000mg BID for DM.

## 2025-04-04 NOTE — H&P ADULT - HISTORY OF PRESENT ILLNESS
63yo F with PMH of HTN, HLD, DMT2 on metformin, necrotizing myositis (dx 2020, currently on prednisone 7.5mg daily and azathioprine) presenting with elevated blood sugars, b/l knee pain and b/l mild shoulder pain, and mid to lower back "heaviness" x 4 days. Pt reports blood sugars in 200s for the past few days, also endorsing increased thirst and urination and feeling dehydrated. Reports she follows with rheumatology and azathioprine started 2-3 weeks ago with goal to decrease prednisone use. Reports chronically elevated CK usually over 1000 and had labs this week with CK of 1600 higher than usual for her.  Pt reports worsening atraumatic b/l knee pain causing difficulty walking. Takes metformin 1000mg BID for DM.

## 2025-04-04 NOTE — ED PROVIDER NOTE - EXTREMITY EXAM
No knee warmth/erythema/edema/ttp and full ROM intact b/l./no deformity, pain or tenderness, no restriction of movement No knee warmth/erythema/edema/ttp and full knee ROM intact b/l./no deformity, pain or tenderness, no restriction of movement

## 2025-04-04 NOTE — ED PROVIDER NOTE - CLINICAL SUMMARY MEDICAL DECISION MAKING FREE TEXT BOX
Adult female history of myositis possibly secondary to statin use presents with bodyaches fatigue weakness past several days.  Patient reports elevation in CK recently.  Concerns for worsening myositis, plan check CK basic labs, urine, [facility] consult room.\  Mukul Houston MD, Facep

## 2025-04-04 NOTE — CONSULT NOTE ADULT - ATTENDING COMMENTS
Modifications made to fellows note above   Necrotizing Myopathy   -> continue prednisone home dose   Back and knee pain   -> trial of meloxicam 7.5mg PO BID  Follow up Dr. Villeda in 2-4 weeks   GI eval as outpatient for difficulty swallowing due to sensation of food getting stuck mid chest.     Hilary Lazo MD  [facility] Division of Rheumatology   [phone]   [email or web address]

## 2025-04-04 NOTE — PATIENT PROFILE ADULT - FALL HARM RISK - HARM RISK INTERVENTIONS
Assistance with ambulation/Assistance OOB with selected safe patient handling equipment/Communicate Risk of Fall with Harm to all staff/Discuss with provider need for PT consult/Monitor gait and stability/Reinforce activity limits and safety measures with patient and family/Tailored Fall Risk Interventions/Visual Cue: Yellow wristband and red socks/Bed in lowest position, wheels locked, appropriate side rails in place/Call bell, personal items and telephone in reach/Instruct patient to call for assistance before getting out of bed or chair/Non-slip footwear when patient is out of bed/[place] to call system/Physically safe environment - no spills, clutter or unnecessary equipment/Purposeful Proactive Rounding/Room/bathroom lighting operational, light cord in reach

## 2025-04-04 NOTE — ED PROVIDER NOTE - ATTENDING APP SHARED VISIT CONTRIBUTION OF CARE
PMD Luna Villeda Rheum  64-year-old female past med history, DMT2 on metformin, HLD, HTN, necrotizing myositis, Family states they believe this is secondary to statin use.  Patient has been on chronic steroids for the past  several years.  Patient reports that when steroids are reduced CPK rises, and patient has had chronic CPK elevations of at least 1000  For very long time.  Patient was recently placed on a medication and reports CK has risen recently to 1600 and now has 2 days worth of bodyaches back pain fatigue weakness.  Patient denies fevers chills, chest pain, shortness of breath, palpitation, nausea vomiting, LOC dizziness.  Physical exam adult female awake alert GCS 15  HEENT normocephalic atraumatic  Chest clear A&P.  CV no rubs gallop murmur.  Abdomen soft positive bowel sounds.  MSK full range of motion in his hips knees ankles shoulders elbows and wrists.  There is no pedal edema.  rw

## 2025-04-04 NOTE — H&P ADULT - REASON FOR ADMISSION
The patient is a 64y Female complaining of back pain general. The patient is a 64y Female complaining of back pain.

## 2025-04-05 LAB
A1C WITH ESTIMATED AVERAGE GLUCOSE RESULT: 7.7 % — HIGH (ref 4–5.6)
ALBUMIN SERPL ELPH-MCNC: 3.5 G/DL — SIGNIFICANT CHANGE UP (ref 3.3–5)
ALP SERPL-CCNC: 57 U/L — SIGNIFICANT CHANGE UP (ref 40–120)
ALT FLD-CCNC: 26 U/L — SIGNIFICANT CHANGE UP (ref 10–45)
ANION GAP SERPL CALC-SCNC: 14 MMOL/L — SIGNIFICANT CHANGE UP (ref 5–17)
AST SERPL-CCNC: 28 U/L — SIGNIFICANT CHANGE UP (ref 10–40)
BILIRUB SERPL-MCNC: 0.5 MG/DL — SIGNIFICANT CHANGE UP (ref 0.2–1.2)
BUN SERPL-MCNC: 7 MG/DL — SIGNIFICANT CHANGE UP (ref 7–23)
CALCIUM SERPL-MCNC: 9.1 MG/DL — SIGNIFICANT CHANGE UP (ref 8.4–10.5)
CHLORIDE SERPL-SCNC: 102 MMOL/L — SIGNIFICANT CHANGE UP (ref 96–108)
CK SERPL-CCNC: 1160 U/L — HIGH (ref 25–170)
CO2 SERPL-SCNC: 21 MMOL/L — LOW (ref 22–31)
CREAT SERPL-MCNC: 0.54 MG/DL — SIGNIFICANT CHANGE UP (ref 0.5–1.3)
CULTURE RESULTS: SIGNIFICANT CHANGE UP
EGFR: 103 ML/MIN/1.73M2 — SIGNIFICANT CHANGE UP
EGFR: 103 ML/MIN/1.73M2 — SIGNIFICANT CHANGE UP
ESTIMATED AVERAGE GLUCOSE: 174 MG/DL — HIGH (ref 68–114)
GLUCOSE BLDC GLUCOMTR-MCNC: 171 MG/DL — HIGH (ref 70–99)
GLUCOSE BLDC GLUCOMTR-MCNC: 201 MG/DL — HIGH (ref 70–99)
GLUCOSE BLDC GLUCOMTR-MCNC: 229 MG/DL — HIGH (ref 70–99)
GLUCOSE BLDC GLUCOMTR-MCNC: 248 MG/DL — HIGH (ref 70–99)
GLUCOSE SERPL-MCNC: 164 MG/DL — HIGH (ref 70–99)
HCT VFR BLD CALC: 34 % — LOW (ref 34.5–45)
HGB BLD-MCNC: 11.6 G/DL — SIGNIFICANT CHANGE UP (ref 11.5–15.5)
MCHC RBC-ENTMCNC: 31.9 PG — SIGNIFICANT CHANGE UP (ref 27–34)
MCHC RBC-ENTMCNC: 34.1 G/DL — SIGNIFICANT CHANGE UP (ref 32–36)
MCV RBC AUTO: 93.4 FL — SIGNIFICANT CHANGE UP (ref 80–100)
NRBC BLD AUTO-RTO: 0 /100 WBCS — SIGNIFICANT CHANGE UP (ref 0–0)
PLATELET # BLD AUTO: 291 K/UL — SIGNIFICANT CHANGE UP (ref 150–400)
POTASSIUM SERPL-MCNC: 3.8 MMOL/L — SIGNIFICANT CHANGE UP (ref 3.5–5.3)
POTASSIUM SERPL-SCNC: 3.8 MMOL/L — SIGNIFICANT CHANGE UP (ref 3.5–5.3)
PROT SERPL-MCNC: 7.4 G/DL — SIGNIFICANT CHANGE UP (ref 6–8.3)
RBC # BLD: 3.64 M/UL — LOW (ref 3.8–5.2)
RBC # FLD: 14 % — SIGNIFICANT CHANGE UP (ref 10.3–14.5)
SODIUM SERPL-SCNC: 137 MMOL/L — SIGNIFICANT CHANGE UP (ref 135–145)
SPECIMEN SOURCE: SIGNIFICANT CHANGE UP
WBC # BLD: 5.05 K/UL — SIGNIFICANT CHANGE UP (ref 3.8–10.5)
WBC # FLD AUTO: 5.05 K/UL — SIGNIFICANT CHANGE UP (ref 3.8–10.5)

## 2025-04-05 RX ADMIN — INSULIN LISPRO 1: 100 INJECTION, SOLUTION INTRAVENOUS; SUBCUTANEOUS at 08:35

## 2025-04-05 RX ADMIN — INSULIN LISPRO 2: 100 INJECTION, SOLUTION INTRAVENOUS; SUBCUTANEOUS at 17:41

## 2025-04-05 RX ADMIN — TIMOLOL MALEATE 1 DROP(S): 6.8 SOLUTION OPHTHALMIC at 09:32

## 2025-04-05 RX ADMIN — LATANOPROST PF 1 DROP(S): 0.05 SOLUTION/ DROPS OPHTHALMIC at 22:59

## 2025-04-05 RX ADMIN — AZATHIOPRINE 50 MILLIGRAM(S): 50 TABLET ORAL at 13:08

## 2025-04-05 RX ADMIN — CELECOXIB 200 MILLIGRAM(S): 50 CAPSULE ORAL at 13:08

## 2025-04-05 RX ADMIN — LOSARTAN POTASSIUM 25 MILLIGRAM(S): 100 TABLET, FILM COATED ORAL at 05:07

## 2025-04-05 RX ADMIN — CELECOXIB 200 MILLIGRAM(S): 50 CAPSULE ORAL at 13:54

## 2025-04-05 RX ADMIN — DORZOLAMIDE 1 DROP(S): 20 SOLUTION/ DROPS OPHTHALMIC at 05:10

## 2025-04-05 RX ADMIN — DORZOLAMIDE 1 DROP(S): 20 SOLUTION/ DROPS OPHTHALMIC at 22:59

## 2025-04-05 RX ADMIN — INSULIN LISPRO 2: 100 INJECTION, SOLUTION INTRAVENOUS; SUBCUTANEOUS at 12:41

## 2025-04-05 RX ADMIN — PREDNISONE 7.5 MILLIGRAM(S): 20 TABLET ORAL at 09:32

## 2025-04-05 NOTE — PROGRESS NOTE ADULT - ASSESSMENT
The patient is a 64y Female complaining of back pain.    #Acute R shoulder pain, lower back pain and bilateral knee pain  #Elevated CPK level (improved from prior)     Rheum eval appreciated  Started on Celebrex  Cont Po Prednisone  Less likely Myocarditis Flare  OOB  CPK stable  Dw family  On IVIG-Rheum f/up    DM II:    FSSS

## 2025-04-05 NOTE — PROGRESS NOTE ADULT - SUBJECTIVE AND OBJECTIVE BOX
Patient is a 64y old  Female who presents with a chief complaint of The patient is a 64y Female complaining of back pain. (04 Apr 2025 19:24)      SUBJECTIVE / OVERNIGHT EVENTS:    Events noted. LE pain- improved  CONSTITUTIONAL: No fever,  or fatigue  RESPIRATORY: No cough, wheezing,  No shortness of breath  CARDIOVASCULAR: No chest pain, palpitations, dizziness, or leg swelling  GASTROINTESTINAL: No abdominal or epigastric pain.       MEDICATIONS  (STANDING):  azaTHIOprine 50 milliGRAM(s) Oral daily  celecoxib 200 milliGRAM(s) Oral daily  dextrose 5%. 1000 milliLiter(s) (100 mL/Hr) IV Continuous <Continuous>  dextrose 5%. 1000 milliLiter(s) (50 mL/Hr) IV Continuous <Continuous>  dextrose 50% Injectable 25 Gram(s) IV Push once  dextrose 50% Injectable 12.5 Gram(s) IV Push once  dextrose 50% Injectable 25 Gram(s) IV Push once  dorzolamide 2% Ophthalmic Solution 1 Drop(s) Both EYES three times a day  glucagon  Injectable 1 milliGRAM(s) IntraMuscular once  insulin lispro (ADMELOG) corrective regimen sliding scale   SubCutaneous three times a day before meals  insulin lispro (ADMELOG) corrective regimen sliding scale   SubCutaneous at bedtime  latanoprost 0.005% Ophthalmic Solution 1 Drop(s) Left EYE at bedtime  losartan 25 milliGRAM(s) Oral daily  predniSONE   Tablet 7.5 milliGRAM(s) Oral daily  timolol 0.5% Solution 1 Drop(s) Both EYES daily    MEDICATIONS  (PRN):  acetaminophen     Tablet .. 650 milliGRAM(s) Oral every 6 hours PRN Temp greater or equal to 38C (100.4F), Mild Pain (1 - 3)  dextrose Oral Gel 15 Gram(s) Oral once PRN Blood Glucose LESS THAN 70 milliGRAM(s)/deciliter        CAPILLARY BLOOD GLUCOSE      POCT Blood Glucose.: 229 mg/dL (05 Apr 2025 17:05)  POCT Blood Glucose.: 201 mg/dL (05 Apr 2025 12:27)  POCT Blood Glucose.: 171 mg/dL (05 Apr 2025 08:19)  POCT Blood Glucose.: 135 mg/dL (04 Apr 2025 22:19)    I&O's Summary    04 Apr 2025 07:01  -  05 Apr 2025 07:00  --------------------------------------------------------  IN: 480 mL / OUT: 0 mL / NET: 480 mL        T(C): 36.8 (04-05-25 @ 11:50), Max: 36.8 (04-05-25 @ 11:50)  HR: 68 (04-05-25 @ 11:50) (62 - 73)  BP: 108/72 (04-05-25 @ 11:50) (103/67 - 131/82)  RR: 18 (04-05-25 @ 11:50) (16 - 18)  SpO2: 96% (04-05-25 @ 11:50) (96% - 100%)    PHYSICAL EXAM:    NECK: Supple, No JVD  CHEST/LUNG: Clear to auscultation bilaterally; No wheezing.  HEART: Regular rate and rhythm; No murmurs, rubs, or gallops  ABDOMEN: Soft, Nontender, Nondistended; Bowel sounds present  EXTREMITIES:   No edema  NEUROLOGY: AAO X 3      LABS:                        11.6   5.05  )-----------( 291      ( 05 Apr 2025 07:12 )             34.0     04-05    137  |  102  |  7   ----------------------------<  164[H]  3.8   |  21[L]  |  0.54    Ca    9.1      05 Apr 2025 07:13    TPro  7.4  /  Alb  3.5  /  TBili  0.5  /  DBili  x   /  AST  28  /  ALT  26  /  AlkPhos  57  04-05          Urinalysis Basic - ( 05 Apr 2025 07:13 )    Color: x / Appearance: x / SG: x / pH: x  Gluc: 164 mg/dL / Ketone: x  / Bili: x / Urobili: x   Blood: x / Protein: x / Nitrite: x   Leuk Esterase: x / RBC: x / WBC x   Sq Epi: x / Non Sq Epi: x / Bacteria: x      CAPILLARY BLOOD GLUCOSE      POCT Blood Glucose.: 229 mg/dL (05 Apr 2025 17:05)  POCT Blood Glucose.: 201 mg/dL (05 Apr 2025 12:27)  POCT Blood Glucose.: 171 mg/dL (05 Apr 2025 08:19)  POCT Blood Glucose.: 135 mg/dL (04 Apr 2025 22:19)        RADIOLOGY & ADDITIONAL TESTS:    Imaging Personally Reviewed:    Consultant(s) Notes Reviewed:      Care Discussed with Consultants/Other Providers:    Rafiq Watkins MD, CMD, FACP    [address] [place] [address]  Office Tel: [phone]  Cell: [phone]   10-Apr-2022 04:38

## 2025-04-06 VITALS
DIASTOLIC BLOOD PRESSURE: 65 MMHG | HEART RATE: 65 BPM | RESPIRATION RATE: 18 BRPM | SYSTOLIC BLOOD PRESSURE: 104 MMHG | TEMPERATURE: 98 F | OXYGEN SATURATION: 98 %

## 2025-04-06 LAB
ANION GAP SERPL CALC-SCNC: 15 MMOL/L — SIGNIFICANT CHANGE UP (ref 5–17)
BUN SERPL-MCNC: 9 MG/DL — SIGNIFICANT CHANGE UP (ref 7–23)
CALCIUM SERPL-MCNC: 9.2 MG/DL — SIGNIFICANT CHANGE UP (ref 8.4–10.5)
CHLORIDE SERPL-SCNC: 98 MMOL/L — SIGNIFICANT CHANGE UP (ref 96–108)
CK SERPL-CCNC: 841 U/L — HIGH (ref 25–170)
CO2 SERPL-SCNC: 21 MMOL/L — LOW (ref 22–31)
CREAT SERPL-MCNC: 0.53 MG/DL — SIGNIFICANT CHANGE UP (ref 0.5–1.3)
EGFR: 103 ML/MIN/1.73M2 — SIGNIFICANT CHANGE UP
EGFR: 103 ML/MIN/1.73M2 — SIGNIFICANT CHANGE UP
GLUCOSE BLDC GLUCOMTR-MCNC: 242 MG/DL — HIGH (ref 70–99)
GLUCOSE BLDC GLUCOMTR-MCNC: 293 MG/DL — HIGH (ref 70–99)
GLUCOSE SERPL-MCNC: 262 MG/DL — HIGH (ref 70–99)
POTASSIUM SERPL-MCNC: 3.4 MMOL/L — LOW (ref 3.5–5.3)
POTASSIUM SERPL-SCNC: 3.4 MMOL/L — LOW (ref 3.5–5.3)
SODIUM SERPL-SCNC: 134 MMOL/L — LOW (ref 135–145)

## 2025-04-06 PROCEDURE — 85018 HEMOGLOBIN: CPT

## 2025-04-06 PROCEDURE — 82010 KETONE BODYS QUAN: CPT

## 2025-04-06 PROCEDURE — 83036 HEMOGLOBIN GLYCOSYLATED A1C: CPT

## 2025-04-06 PROCEDURE — 85014 HEMATOCRIT: CPT

## 2025-04-06 PROCEDURE — 82550 ASSAY OF CK (CPK): CPT

## 2025-04-06 PROCEDURE — 82947 ASSAY GLUCOSE BLOOD QUANT: CPT

## 2025-04-06 PROCEDURE — 83605 ASSAY OF LACTIC ACID: CPT

## 2025-04-06 PROCEDURE — 99285 EMERGENCY DEPT VISIT HI MDM: CPT

## 2025-04-06 PROCEDURE — 82962 GLUCOSE BLOOD TEST: CPT

## 2025-04-06 PROCEDURE — 85027 COMPLETE CBC AUTOMATED: CPT

## 2025-04-06 PROCEDURE — 87086 URINE CULTURE/COLONY COUNT: CPT

## 2025-04-06 PROCEDURE — 81001 URINALYSIS AUTO W/SCOPE: CPT

## 2025-04-06 PROCEDURE — 80048 BASIC METABOLIC PNL TOTAL CA: CPT

## 2025-04-06 PROCEDURE — 82803 BLOOD GASES ANY COMBINATION: CPT

## 2025-04-06 PROCEDURE — 84295 ASSAY OF SERUM SODIUM: CPT

## 2025-04-06 PROCEDURE — 84132 ASSAY OF SERUM POTASSIUM: CPT

## 2025-04-06 PROCEDURE — 36000 PLACE NEEDLE IN VEIN: CPT

## 2025-04-06 PROCEDURE — 82330 ASSAY OF CALCIUM: CPT

## 2025-04-06 PROCEDURE — 82435 ASSAY OF BLOOD CHLORIDE: CPT

## 2025-04-06 PROCEDURE — 85025 COMPLETE CBC W/AUTO DIFF WBC: CPT

## 2025-04-06 PROCEDURE — 80053 COMPREHEN METABOLIC PANEL: CPT

## 2025-04-06 RX ORDER — PREDNISONE 20 MG/1
2 TABLET ORAL
Refills: 0 | DISCHARGE

## 2025-04-06 RX ORDER — LOSARTAN POTASSIUM 100 MG/1
1 TABLET, FILM COATED ORAL
Refills: 0 | DISCHARGE

## 2025-04-06 RX ORDER — PREDNISONE 20 MG/1
3 TABLET ORAL
Qty: 42 | Refills: 0
Start: 2025-04-06 | End: 2025-04-19

## 2025-04-06 RX ORDER — AZATHIOPRINE 50 MG/1
1 TABLET ORAL
Refills: 0 | DISCHARGE

## 2025-04-06 RX ORDER — LOSARTAN POTASSIUM 100 MG/1
1 TABLET, FILM COATED ORAL
Qty: 0 | Refills: 0 | DISCHARGE
Start: 2025-04-06

## 2025-04-06 RX ORDER — AZATHIOPRINE 50 MG/1
1 TABLET ORAL
Qty: 0 | Refills: 0 | DISCHARGE
Start: 2025-04-06

## 2025-04-06 RX ORDER — ACETAMINOPHEN 500 MG/5ML
2 LIQUID (ML) ORAL
Qty: 0 | Refills: 0 | DISCHARGE
Start: 2025-04-06

## 2025-04-06 RX ORDER — CELECOXIB 50 MG/1
1 CAPSULE ORAL
Qty: 14 | Refills: 0
Start: 2025-04-06 | End: 2025-04-19

## 2025-04-06 RX ORDER — ALENDRONATE SODIUM 70 MG/1
1 TABLET ORAL
Refills: 0 | DISCHARGE

## 2025-04-06 RX ADMIN — TIMOLOL MALEATE 1 DROP(S): 6.8 SOLUTION OPHTHALMIC at 12:37

## 2025-04-06 RX ADMIN — INSULIN LISPRO 3: 100 INJECTION, SOLUTION INTRAVENOUS; SUBCUTANEOUS at 12:36

## 2025-04-06 RX ADMIN — AZATHIOPRINE 50 MILLIGRAM(S): 50 TABLET ORAL at 12:36

## 2025-04-06 RX ADMIN — CELECOXIB 200 MILLIGRAM(S): 50 CAPSULE ORAL at 12:36

## 2025-04-06 RX ADMIN — LOSARTAN POTASSIUM 25 MILLIGRAM(S): 100 TABLET, FILM COATED ORAL at 05:30

## 2025-04-06 RX ADMIN — CELECOXIB 200 MILLIGRAM(S): 50 CAPSULE ORAL at 13:52

## 2025-04-06 RX ADMIN — Medication 40 MILLIEQUIVALENT(S): at 12:35

## 2025-04-06 RX ADMIN — DORZOLAMIDE 1 DROP(S): 20 SOLUTION/ DROPS OPHTHALMIC at 05:30

## 2025-04-06 RX ADMIN — INSULIN LISPRO 2: 100 INJECTION, SOLUTION INTRAVENOUS; SUBCUTANEOUS at 09:01

## 2025-04-06 RX ADMIN — PREDNISONE 7.5 MILLIGRAM(S): 20 TABLET ORAL at 09:06

## 2025-04-06 NOTE — DISCHARGE NOTE NURSING/CASE MANAGEMENT/SOCIAL WORK - NSDCPEFALRISK_GEN_ALL_CORE
For information on Fall & Injury Prevention, visit: [email or web address] OR  [email or web address] OR  https://www.cdc.gov/steadi/patient.html

## 2025-04-06 NOTE — DISCHARGE NOTE PROVIDER - NSDCCPCAREPLAN_GEN_ALL_CORE_FT
PRINCIPAL DISCHARGE DIAGNOSIS  Diagnosis: Multiple joint pain  Assessment and Plan of Treatment: -Can trial meloxicam 7.5 mg BID for pain control  -Can trial Tylenol   -C/w home prednisone 7.5 mg daily and azathioprine 50 mg daily   -Recommend outpatient follow up with PCP or GI to evaluate GI symptoms of food getting stuck   -Recommend outpatient f/u with Rheumatology Dr. Villeda after discharge

## 2025-04-06 NOTE — DISCHARGE NOTE PROVIDER - CARE PROVIDER_API CALL
Luna Villeda  Rheumatology  [address]  [place] [address]  Phone: [phone]  Fax: [phone]  Follow Up Time:

## 2025-04-06 NOTE — DISCHARGE NOTE NURSING/CASE MANAGEMENT/SOCIAL WORK - FINANCIAL ASSISTANCE
[facility] provides services at a reduced cost to those who are determined to be eligible through [facility]’s financial assistance program. Information regarding [facility]’s financial assistance program can be found by going to [email or web address] or by calling [phone].

## 2025-04-06 NOTE — DISCHARGE NOTE PROVIDER - NSDCMRMEDTOKEN_GEN_ALL_CORE_FT
alendronate 70 mg oral tablet: 1 tab(s) orally once a week Tuesday  azaTHIOprine 50 mg oral tablet: 1 tab(s) orally once a day  brinzolamide 1% ophthalmic suspension: 1 drop(s) in each eye 2 times a day  cholecalciferol 25 mcg (1000 intl units) oral tablet: 1 tab(s) orally once a day  losartan 25 mg oral tablet: 1 tab(s) orally once a day  MetFORMIN (Eqv-Fortamet) 500 mg oral tablet, extended release: 1 tab(s) orally 2 times a day  nateglinide 60 mg oral tablet: 1 tab(s) orally every 12 hours  predniSONE 5 mg oral tablet: 2 tab(s) orally once a day  timolol maleate 0.5% ophthalmic solution: 1 drop(s) to each affected eye once a day  travoprost 0.004% ophthalmic solution: 1 drop(s) to each affected eye once a day (in the evening)   acetaminophen 325 mg oral tablet: 2 tab(s) orally every 6 hours As needed Temp greater or equal to 38C (100.4F), Mild Pain (1 - 3)  azaTHIOprine 50 mg oral tablet: 1 tab(s) orally once a day  brinzolamide 1% ophthalmic suspension: 1 drop(s) in each eye 2 times a day  celecoxib 200 mg oral capsule: 1 cap(s) orally once a day  cholecalciferol 25 mcg (1000 intl units) oral tablet: 1 tab(s) orally once a day  losartan 25 mg oral tablet: 1 tab(s) orally once a day  MetFORMIN (Eqv-Fortamet) 500 mg oral tablet, extended release: 1 tab(s) orally 2 times a day  nateglinide 60 mg oral tablet: 1 tab(s) orally every 12 hours  predniSONE 2.5 mg oral tablet: 3 tab(s) orally once a day  timolol maleate 0.5% ophthalmic solution: 1 drop(s) to each affected eye once a day  travoprost 0.004% ophthalmic solution: 1 drop(s) to each affected eye once a day (in the evening)

## 2025-04-06 NOTE — DISCHARGE NOTE NURSING/CASE MANAGEMENT/SOCIAL WORK - PATIENT PORTAL LINK FT
You can access the FollowMyHealth Patient Portal offered by [facility] by registering at the following website: [email or web address]. By joining [facility]’s FollowMyHealth portal, you will also be able to view your health information using other applications (apps) compatible with our system.

## 2025-04-06 NOTE — DISCHARGE NOTE PROVIDER - HOSPITAL COURSE
HPI:  65yo F with PMH of HTN, HLD, DMT2 on metformin, necrotizing myositis (dx 2020, currently on prednisone 7.5mg daily and azathioprine) presenting with elevated blood sugars, b/l knee pain and b/l mild shoulder pain, and mid to lower back "heaviness" x 4 days. Pt reports blood sugars in 200s for the past few days, also endorsing increased thirst and urination and feeling dehydrated. Reports she follows with rheumatology and azathioprine started 2-3 weeks ago with goal to decrease prednisone use. Reports chronically elevated CK usually over 1000 and had labs this week with CK of 1600 higher than usual for her.  Pt reports worsening atraumatic b/l knee pain causing difficulty walking. Takes metformin 1000mg BID for DM.   (04 Apr 2025 19:24)    Hospital Course:The patient was admitted for evaluation of possible myositis flare. She reported her current symptoms felt different from prior flares, describing pain rather than weakness. She denied fevers, chills, trauma, sore throat, chest pain, or trouble breathing. She did report dry eyes/mouth and intermittent diarrhea for the past month, as well as a recent sensation of food sticking in her throat.    Rheumatology was consulted. Physical exam revealed 5/5 strength in bilateral upper extremities and 4+/5 strength in bilateral hip flexion. Creatine Kinase (CK) was 1177, improved from a prior outpatient value of 1683. The rheumatologist felt the presentation was less consistent with a myositis flare and more likely musculoskeletal in nature.  Musculoskeletal Pain: The patient was started on Meloxicam 7.5mg BID for pain management. Tylenol was also recommended as needed.  Necrotizing Myositis: Continue home dose of Prednisone 7.5mg daily and Azathioprine 50mg daily. Close follow-up with outpatient rheumatology (Dr. Villeda) in 2-4 weeks.  Hyperglycemia: Continue Metformin. Encourage blood glucose monitoring.  Dysphagia: Recommend outpatient GI evaluation for the sensation of food sticking in her throat.      Important Medication Changes and Reason:  -> continue prednisone home dose   -> trial of meloxicam 7.5mg PO BID    Active or Pending Issues Requiring Follow-up:    Continue prescribed medications as directed.  Monitor blood glucose levels regularly.  Follow up with Dr. Villeda (Rheumatology) in 2-4 weeks.  Return to the Emergency Department if symptoms worsen or new concerns arise.    Advanced Directives:   [ ] Full code  [ ] DNR  [ ] Hospice    Discharge Diagnoses:Necrotizing Myopathy

## 2025-04-06 NOTE — DISCHARGE NOTE PROVIDER - NSDCFUSCHEDAPPT_GEN_ALL_CORE_FT
Luna Villeda  [facility]  [address]  Scheduled Appointment: 04/29/2025

## 2025-04-10 ENCOUNTER — TRANSCRIPTION ENCOUNTER (OUTPATIENT)
Age: 65
End: 2025-04-10

## 2025-04-11 ENCOUNTER — APPOINTMENT (OUTPATIENT)
Dept: RHEUMATOLOGY | Facility: CLINIC | Age: 65
End: 2025-04-11
Payer: MEDICARE

## 2025-04-11 VITALS
DIASTOLIC BLOOD PRESSURE: 76 MMHG | HEART RATE: 71 BPM | HEIGHT: 62.4 IN | SYSTOLIC BLOOD PRESSURE: 120 MMHG | WEIGHT: 133 LBS | BODY MASS INDEX: 24.17 KG/M2 | OXYGEN SATURATION: 99 %

## 2025-04-11 DIAGNOSIS — Z79.899 OTHER LONG TERM (CURRENT) DRUG THERAPY: ICD-10-CM

## 2025-04-11 DIAGNOSIS — L30.9 DERMATITIS, UNSPECIFIED: ICD-10-CM

## 2025-04-11 DIAGNOSIS — M60.80 OTHER MYOSITIS, UNSPECIFIED SITE: ICD-10-CM

## 2025-04-11 PROCEDURE — 99214 OFFICE O/P EST MOD 30 MIN: CPT

## 2025-04-21 ENCOUNTER — TRANSCRIPTION ENCOUNTER (OUTPATIENT)
Age: 65
End: 2025-04-21

## 2025-04-22 ENCOUNTER — TRANSCRIPTION ENCOUNTER (OUTPATIENT)
Age: 65
End: 2025-04-22

## 2025-04-23 ENCOUNTER — TRANSCRIPTION ENCOUNTER (OUTPATIENT)
Age: 65
End: 2025-04-23

## 2025-04-29 ENCOUNTER — APPOINTMENT (OUTPATIENT)
Dept: RHEUMATOLOGY | Facility: CLINIC | Age: 65
End: 2025-04-29
Payer: MEDICARE

## 2025-04-29 VITALS
HEART RATE: 72 BPM | DIASTOLIC BLOOD PRESSURE: 76 MMHG | SYSTOLIC BLOOD PRESSURE: 128 MMHG | HEIGHT: 62.4 IN | WEIGHT: 130 LBS | BODY MASS INDEX: 23.62 KG/M2 | OXYGEN SATURATION: 98 %

## 2025-04-29 LAB
ALBUMIN SERPL ELPH-MCNC: 4.4 G/DL
ALP BLD-CCNC: 65 U/L
ALT SERPL-CCNC: 50 U/L
ANION GAP SERPL CALC-SCNC: 15 MMOL/L
AST SERPL-CCNC: 43 U/L
BASOPHILS # BLD AUTO: 0.08 K/UL
BASOPHILS NFR BLD AUTO: 1.3 %
BILIRUB SERPL-MCNC: 0.4 MG/DL
BUN SERPL-MCNC: 10 MG/DL
CALCIUM SERPL-MCNC: 9.4 MG/DL
CHLORIDE SERPL-SCNC: 99 MMOL/L
CK SERPL-CCNC: 2715 U/L
CO2 SERPL-SCNC: 25 MMOL/L
CREAT SERPL-MCNC: 0.51 MG/DL
CRP SERPL-MCNC: 10 MG/L
DEPRECATED KAPPA LC FREE/LAMBDA SER: 0.88 RATIO
EGFRCR SERPLBLD CKD-EPI 2021: 104 ML/MIN/1.73M2
EOSINOPHIL # BLD AUTO: 0.19 K/UL
EOSINOPHIL NFR BLD AUTO: 3 %
ERYTHROCYTE [SEDIMENTATION RATE] IN BLOOD BY WESTERGREN METHOD: 29 MM/HR
GLUCOSE SERPL-MCNC: 215 MG/DL
HCT VFR BLD CALC: 38.8 %
HGB BLD-MCNC: 12.8 G/DL
IGA SER QL IEP: 254 MG/DL
IGG SER QL IEP: 1308 MG/DL
IGM SER QL IEP: 44 MG/DL
IMM GRANULOCYTES NFR BLD AUTO: 0.3 %
KAPPA LC CSF-MCNC: 1.18 MG/DL
KAPPA LC SERPL-MCNC: 1.04 MG/DL
LYMPHOCYTES # BLD AUTO: 0.93 K/UL
LYMPHOCYTES NFR BLD AUTO: 14.8 %
MAN DIFF?: NORMAL
MCHC RBC-ENTMCNC: 31.6 PG
MCHC RBC-ENTMCNC: 33 G/DL
MCV RBC AUTO: 95.8 FL
MONOCYTES # BLD AUTO: 0.28 K/UL
MONOCYTES NFR BLD AUTO: 4.5 %
MYOGLOBIN SERPL-MCNC: 505 NG/ML
NEUTROPHILS # BLD AUTO: 4.79 K/UL
NEUTROPHILS NFR BLD AUTO: 76.1 %
PLATELET # BLD AUTO: 376 K/UL
POTASSIUM SERPL-SCNC: 4.1 MMOL/L
PROT SERPL-MCNC: 7.6 G/DL
RBC # BLD: 4.05 M/UL
RBC # FLD: 14.8 %
SODIUM SERPL-SCNC: 139 MMOL/L
WBC # FLD AUTO: 6.29 K/UL

## 2025-04-29 PROCEDURE — 99215 OFFICE O/P EST HI 40 MIN: CPT

## 2025-04-29 PROCEDURE — G2211 COMPLEX E/M VISIT ADD ON: CPT

## 2025-04-29 RX ORDER — METHYLPREDNISOLONE SODIUM SUCCINATE 125 MG/2ML
125 INJECTION, POWDER, FOR SOLUTION INTRAMUSCULAR; INTRAVENOUS
Refills: 0 | Status: ACTIVE | OUTPATIENT
Start: 2025-04-29

## 2025-04-29 RX ORDER — OBINUTUZUMAB 1000 MG/40ML
1000 INJECTION, SOLUTION, CONCENTRATE INTRAVENOUS
Refills: 0 | Status: ACTIVE | OUTPATIENT
Start: 2025-04-29

## 2025-04-29 RX ORDER — ACETAMINOPHEN 325 MG/1
325 TABLET ORAL
Refills: 0 | Status: ACTIVE | OUTPATIENT
Start: 2025-04-29

## 2025-04-29 RX ORDER — CETIRIZINE HYDROCHLORIDE 10 MG/1
10 TABLET, FILM COATED ORAL
Refills: 0 | Status: ACTIVE | OUTPATIENT
Start: 2025-04-29

## 2025-04-29 RX ADMIN — ACETAMINOPHEN 0 MG: 325 TABLET ORAL at 00:00

## 2025-04-29 RX ADMIN — CETIRIZINE HYDROCHLORIDE 0 MG: 10 TABLET, FILM COATED ORAL at 00:00

## 2025-04-29 RX ADMIN — OBINUTUZUMAB 0 MG/40ML: 1000 INJECTION, SOLUTION, CONCENTRATE INTRAVENOUS at 00:00

## 2025-04-29 RX ADMIN — METHYLPREDNISOLONE SODIUM SUCCINATE 0 MG: 125 INJECTION, POWDER, FOR SOLUTION INTRAMUSCULAR; INTRAVENOUS at 00:00

## 2025-05-08 ENCOUNTER — TRANSCRIPTION ENCOUNTER (OUTPATIENT)
Age: 65
End: 2025-05-08

## 2025-05-08 LAB
CK SERPL-CCNC: 2188 U/L
HAV IGM SER QL: NONREACTIVE
HBV CORE IGM SER QL: NONREACTIVE
HBV SURFACE AG SER QL: NONREACTIVE
HCV AB SER QL: NONREACTIVE
HCV S/CO RATIO: 0.11 S/CO

## 2025-05-12 ENCOUNTER — TRANSCRIPTION ENCOUNTER (OUTPATIENT)
Age: 65
End: 2025-05-12

## 2025-05-13 ENCOUNTER — TRANSCRIPTION ENCOUNTER (OUTPATIENT)
Age: 65
End: 2025-05-13

## 2025-05-14 ENCOUNTER — TRANSCRIPTION ENCOUNTER (OUTPATIENT)
Age: 65
End: 2025-05-14

## 2025-05-15 ENCOUNTER — TRANSCRIPTION ENCOUNTER (OUTPATIENT)
Age: 65
End: 2025-05-15

## 2025-05-22 ENCOUNTER — TRANSCRIPTION ENCOUNTER (OUTPATIENT)
Age: 65
End: 2025-05-22

## 2025-05-23 ENCOUNTER — TRANSCRIPTION ENCOUNTER (OUTPATIENT)
Age: 65
End: 2025-05-23

## 2025-05-28 ENCOUNTER — TRANSCRIPTION ENCOUNTER (OUTPATIENT)
Age: 65
End: 2025-05-28

## 2025-05-28 LAB
ALBUMIN SERPL ELPH-MCNC: 4.2 G/DL
ALP BLD-CCNC: 77 U/L
ALT SERPL-CCNC: 113 U/L
ANION GAP SERPL CALC-SCNC: 15 MMOL/L
AST SERPL-CCNC: 84 U/L
BASOPHILS # BLD AUTO: 0.03 K/UL
BASOPHILS NFR BLD AUTO: 0.3 %
BILIRUB SERPL-MCNC: 0.4 MG/DL
BUN SERPL-MCNC: 9 MG/DL
CALCIUM SERPL-MCNC: 9.8 MG/DL
CHLORIDE SERPL-SCNC: 99 MMOL/L
CK SERPL-CCNC: 3463 U/L
CO2 SERPL-SCNC: 24 MMOL/L
CREAT SERPL-MCNC: 0.47 MG/DL
CRP SERPL-MCNC: 64 MG/L
EGFRCR SERPLBLD CKD-EPI 2021: 106 ML/MIN/1.73M2
EOSINOPHIL # BLD AUTO: 0.08 K/UL
EOSINOPHIL NFR BLD AUTO: 0.8 %
ERYTHROCYTE [SEDIMENTATION RATE] IN BLOOD BY WESTERGREN METHOD: 30 MM/HR
FERRITIN SERPL-MCNC: 519 NG/ML
GLUCOSE SERPL-MCNC: 192 MG/DL
HCT VFR BLD CALC: 42.4 %
HGB BLD-MCNC: 13.2 G/DL
IMM GRANULOCYTES NFR BLD AUTO: 0.4 %
LYMPHOCYTES # BLD AUTO: 0.81 K/UL
LYMPHOCYTES NFR BLD AUTO: 8.3 %
MAN DIFF?: NORMAL
MCHC RBC-ENTMCNC: 30.2 PG
MCHC RBC-ENTMCNC: 31.1 G/DL
MCV RBC AUTO: 97 FL
MONOCYTES # BLD AUTO: 0.16 K/UL
MONOCYTES NFR BLD AUTO: 1.6 %
MYOGLOBIN SERPL-MCNC: 921 NG/ML
NEUTROPHILS # BLD AUTO: 8.62 K/UL
NEUTROPHILS NFR BLD AUTO: 88.6 %
PLATELET # BLD AUTO: 371 K/UL
POTASSIUM SERPL-SCNC: 4.3 MMOL/L
PROT SERPL-MCNC: 8.5 G/DL
RBC # BLD: 4.37 M/UL
RBC # FLD: 14.2 %
SODIUM SERPL-SCNC: 138 MMOL/L
WBC # FLD AUTO: 9.74 K/UL

## 2025-05-29 ENCOUNTER — TRANSCRIPTION ENCOUNTER (OUTPATIENT)
Age: 65
End: 2025-05-29

## 2025-06-02 ENCOUNTER — TRANSCRIPTION ENCOUNTER (OUTPATIENT)
Age: 65
End: 2025-06-02

## 2025-06-11 ENCOUNTER — APPOINTMENT (OUTPATIENT)
Dept: RHEUMATOLOGY | Facility: CLINIC | Age: 65
End: 2025-06-11
Payer: MEDICARE

## 2025-06-11 VITALS
HEART RATE: 70 BPM | RESPIRATION RATE: 16 BRPM | DIASTOLIC BLOOD PRESSURE: 80 MMHG | SYSTOLIC BLOOD PRESSURE: 129 MMHG | TEMPERATURE: 98 F | OXYGEN SATURATION: 98 %

## 2025-06-11 VITALS — DIASTOLIC BLOOD PRESSURE: 79 MMHG | OXYGEN SATURATION: 96 % | HEART RATE: 66 BPM | SYSTOLIC BLOOD PRESSURE: 118 MMHG

## 2025-06-11 VITALS
DIASTOLIC BLOOD PRESSURE: 79 MMHG | HEART RATE: 84 BPM | TEMPERATURE: 98 F | RESPIRATION RATE: 16 BRPM | OXYGEN SATURATION: 96 % | SYSTOLIC BLOOD PRESSURE: 143 MMHG

## 2025-06-11 PROCEDURE — 96374 THER/PROPH/DIAG INJ IV PUSH: CPT | Mod: 59

## 2025-06-11 PROCEDURE — 96413 CHEMO IV INFUSION 1 HR: CPT

## 2025-06-11 PROCEDURE — 96415 CHEMO IV INFUSION ADDL HR: CPT

## 2025-06-11 RX ORDER — METHYLPREDNISOLONE SODIUM SUCCINATE 125 MG/2ML
125 INJECTION, POWDER, FOR SOLUTION INTRAMUSCULAR; INTRAVENOUS
Qty: 0 | Refills: 0 | Status: COMPLETED
Start: 2025-04-29

## 2025-06-11 RX ORDER — ACETAMINOPHEN 325 MG/1
325 TABLET ORAL
Qty: 0 | Refills: 0 | Status: COMPLETED
Start: 2025-04-29

## 2025-06-11 RX ORDER — OBINUTUZUMAB 1000 MG/40ML
1000 INJECTION, SOLUTION, CONCENTRATE INTRAVENOUS
Qty: 0 | Refills: 0 | Status: COMPLETED
Start: 2025-04-29

## 2025-06-11 RX ORDER — CETIRIZINE HYDROCHLORIDE 10 MG/1
10 TABLET, FILM COATED ORAL
Qty: 0 | Refills: 0 | Status: COMPLETED
Start: 2025-04-29

## 2025-06-17 ENCOUNTER — APPOINTMENT (OUTPATIENT)
Dept: RHEUMATOLOGY | Facility: CLINIC | Age: 65
End: 2025-06-17
Payer: MEDICARE

## 2025-06-17 VITALS
BODY MASS INDEX: 23.65 KG/M2 | WEIGHT: 131 LBS | DIASTOLIC BLOOD PRESSURE: 76 MMHG | SYSTOLIC BLOOD PRESSURE: 133 MMHG | OXYGEN SATURATION: 97 % | HEART RATE: 81 BPM

## 2025-06-17 PROCEDURE — 99215 OFFICE O/P EST HI 40 MIN: CPT

## 2025-06-17 PROCEDURE — G2211 COMPLEX E/M VISIT ADD ON: CPT

## 2025-06-19 ENCOUNTER — TRANSCRIPTION ENCOUNTER (OUTPATIENT)
Age: 65
End: 2025-06-19

## 2025-06-19 LAB
ALBUMIN SERPL ELPH-MCNC: 4.3 G/DL
ALP BLD-CCNC: 75 U/L
ALT SERPL-CCNC: 192 U/L
ANION GAP SERPL CALC-SCNC: 17 MMOL/L
AST SERPL-CCNC: 158 U/L
BASOPHILS # BLD AUTO: 0.03 K/UL
BASOPHILS NFR BLD AUTO: 0.2 %
BILIRUB SERPL-MCNC: 0.4 MG/DL
BUN SERPL-MCNC: 14 MG/DL
CALCIUM SERPL-MCNC: 9.8 MG/DL
CHLORIDE SERPL-SCNC: 95 MMOL/L
CK SERPL-CCNC: 5638 U/L
CO2 SERPL-SCNC: 21 MMOL/L
CREAT SERPL-MCNC: 0.48 MG/DL
CRP SERPL-MCNC: 5 MG/L
EGFRCR SERPLBLD CKD-EPI 2021: 105 ML/MIN/1.73M2
EOSINOPHIL # BLD AUTO: 0.01 K/UL
EOSINOPHIL NFR BLD AUTO: 0.1 %
ERYTHROCYTE [SEDIMENTATION RATE] IN BLOOD BY WESTERGREN METHOD: 75 MM/HR
GLUCOSE SERPL-MCNC: 311 MG/DL
HCT VFR BLD CALC: 42.9 %
HGB BLD-MCNC: 13.9 G/DL
IMM GRANULOCYTES NFR BLD AUTO: 0.5 %
LYMPHOCYTES # BLD AUTO: 0.96 K/UL
LYMPHOCYTES NFR BLD AUTO: 6 %
MAN DIFF?: NORMAL
MCHC RBC-ENTMCNC: 29.6 PG
MCHC RBC-ENTMCNC: 32.4 G/DL
MCV RBC AUTO: 91.3 FL
MONOCYTES # BLD AUTO: 0.09 K/UL
MONOCYTES NFR BLD AUTO: 0.6 %
MYOGLOBIN SERPL-MCNC: 1016 NG/ML
NEUTROPHILS # BLD AUTO: 14.81 K/UL
NEUTROPHILS NFR BLD AUTO: 92.6 %
PLATELET # BLD AUTO: 421 K/UL
POTASSIUM SERPL-SCNC: 5 MMOL/L
PROT SERPL-MCNC: 8.3 G/DL
RBC # BLD: 4.7 M/UL
RBC # FLD: 14.5 %
SODIUM SERPL-SCNC: 134 MMOL/L
WBC # FLD AUTO: 15.98 K/UL

## 2025-06-20 ENCOUNTER — TRANSCRIPTION ENCOUNTER (OUTPATIENT)
Age: 65
End: 2025-06-20

## 2025-06-25 ENCOUNTER — APPOINTMENT (OUTPATIENT)
Dept: RHEUMATOLOGY | Facility: CLINIC | Age: 65
End: 2025-06-25
Payer: MEDICARE

## 2025-06-25 VITALS — OXYGEN SATURATION: 97 % | SYSTOLIC BLOOD PRESSURE: 120 MMHG | HEART RATE: 83 BPM | DIASTOLIC BLOOD PRESSURE: 71 MMHG

## 2025-06-25 VITALS
RESPIRATION RATE: 16 BRPM | SYSTOLIC BLOOD PRESSURE: 132 MMHG | TEMPERATURE: 98 F | OXYGEN SATURATION: 97 % | DIASTOLIC BLOOD PRESSURE: 82 MMHG | HEART RATE: 82 BPM

## 2025-06-25 VITALS
TEMPERATURE: 98.3 F | DIASTOLIC BLOOD PRESSURE: 75 MMHG | OXYGEN SATURATION: 99 % | HEART RATE: 59 BPM | SYSTOLIC BLOOD PRESSURE: 119 MMHG

## 2025-06-25 PROCEDURE — 96374 THER/PROPH/DIAG INJ IV PUSH: CPT | Mod: 59

## 2025-06-25 PROCEDURE — 96413 CHEMO IV INFUSION 1 HR: CPT

## 2025-06-25 PROCEDURE — 96415 CHEMO IV INFUSION ADDL HR: CPT

## 2025-06-25 RX ORDER — OBINUTUZUMAB 1000 MG/40ML
1000 INJECTION, SOLUTION, CONCENTRATE INTRAVENOUS
Qty: 0 | Refills: 0 | Status: COMPLETED
Start: 2025-04-29

## 2025-06-25 RX ORDER — METHYLPREDNISOLONE SODIUM SUCCINATE 125 MG/2ML
125 INJECTION, POWDER, FOR SOLUTION INTRAMUSCULAR; INTRAVENOUS
Qty: 0 | Refills: 0 | Status: COMPLETED
Start: 2025-04-29

## 2025-06-25 RX ORDER — CETIRIZINE HYDROCHLORIDE 10 MG/1
10 TABLET, FILM COATED ORAL
Qty: 0 | Refills: 0 | Status: COMPLETED
Start: 2025-04-29

## 2025-07-11 ENCOUNTER — RX RENEWAL (OUTPATIENT)
Age: 65
End: 2025-07-11

## 2025-07-15 ENCOUNTER — TRANSCRIPTION ENCOUNTER (OUTPATIENT)
Age: 65
End: 2025-07-15

## 2025-07-18 LAB
ALBUMIN SERPL ELPH-MCNC: 4.4 G/DL
ALP BLD-CCNC: 63 U/L
ALT SERPL-CCNC: 111 U/L
ANION GAP SERPL CALC-SCNC: 13 MMOL/L
AST SERPL-CCNC: 61 U/L
BASOPHILS # BLD AUTO: 0.04 K/UL
BASOPHILS NFR BLD AUTO: 0.7 %
BILIRUB SERPL-MCNC: 0.4 MG/DL
BUN SERPL-MCNC: 11 MG/DL
CALCIUM SERPL-MCNC: 9.2 MG/DL
CD16+CD56+ CELLS # BLD: 193 CELLS/UL
CD16+CD56+ CELLS NFR BLD: 15 %
CD19 CELLS NFR BLD: 0 CELLS/UL
CD3 CELLS # BLD: 1142 CELLS/UL
CD3 CELLS NFR BLD: 82 %
CD3+CD4+ CELLS # BLD: 706 CELLS/UL
CD3+CD4+ CELLS NFR BLD: 48 %
CD3+CD4+ CELLS/CD3+CD8+ CLL SPEC: 1.42 RATIO
CD3+CD8+ CELLS # SPEC: 497 CELLS/UL
CD3+CD8+ CELLS NFR BLD: 34 %
CELLS.CD3-CD19+/CELLS IN BLOOD: <1 %
CHLORIDE SERPL-SCNC: 99 MMOL/L
CK SERPL-CCNC: 2201 U/L
CO2 SERPL-SCNC: 24 MMOL/L
CREAT SERPL-MCNC: 0.4 MG/DL
CRP SERPL-MCNC: 4 MG/L
EGFRCR SERPLBLD CKD-EPI 2021: 110 ML/MIN/1.73M2
EOSINOPHIL # BLD AUTO: 0.08 K/UL
EOSINOPHIL NFR BLD AUTO: 1.3 %
ERYTHROCYTE [SEDIMENTATION RATE] IN BLOOD BY WESTERGREN METHOD: 24 MM/HR
FERRITIN SERPL-MCNC: 154 NG/ML
GLUCOSE SERPL-MCNC: 183 MG/DL
HCT VFR BLD CALC: 44 %
HGB BLD-MCNC: 13.8 G/DL
IMM GRANULOCYTES NFR BLD AUTO: 0.3 %
LYMPHOCYTES # BLD AUTO: 1.66 K/UL
LYMPHOCYTES NFR BLD AUTO: 27.4 %
MAN DIFF?: NORMAL
MCHC RBC-ENTMCNC: 30.1 PG
MCHC RBC-ENTMCNC: 31.4 G/DL
MCV RBC AUTO: 96.1 FL
MONOCYTES # BLD AUTO: 0.36 K/UL
MONOCYTES NFR BLD AUTO: 5.9 %
NEUTROPHILS # BLD AUTO: 3.9 K/UL
NEUTROPHILS NFR BLD AUTO: 64.4 %
PLATELET # BLD AUTO: 339 K/UL
POTASSIUM SERPL-SCNC: 4.5 MMOL/L
PROT SERPL-MCNC: 7.8 G/DL
RBC # BLD: 4.58 M/UL
RBC # FLD: 15.8 %
SODIUM SERPL-SCNC: 137 MMOL/L
VIABILITY: NORMAL
WBC # FLD AUTO: 6.06 K/UL

## 2025-07-22 ENCOUNTER — APPOINTMENT (OUTPATIENT)
Dept: RHEUMATOLOGY | Facility: CLINIC | Age: 65
End: 2025-07-22
Payer: MEDICARE

## 2025-07-22 VITALS
OXYGEN SATURATION: 98 % | DIASTOLIC BLOOD PRESSURE: 77 MMHG | BODY MASS INDEX: 23.62 KG/M2 | RESPIRATION RATE: 16 BRPM | SYSTOLIC BLOOD PRESSURE: 124 MMHG | HEART RATE: 88 BPM | WEIGHT: 130 LBS | HEIGHT: 62.4 IN

## 2025-07-22 LAB
DEPRECATED KAPPA LC FREE/LAMBDA SER: 0.86 RATIO
IGA SERPL-MCNC: 207 MG/DL
IGG SERPL-MCNC: 1660 MG/DL
IGM SERPL-MCNC: 32 MG/DL
KAPPA LC CSF-MCNC: 1.11 MG/DL
KAPPA LC SERPL-MCNC: 0.96 MG/DL

## 2025-07-22 PROCEDURE — G2211 COMPLEX E/M VISIT ADD ON: CPT

## 2025-07-22 PROCEDURE — 99215 OFFICE O/P EST HI 40 MIN: CPT

## 2025-07-22 RX ORDER — IRON HEME POLYPEPTIDE/FOLIC AC 12-1MG
125 MCG TABLET ORAL
Qty: 30 | Refills: 2 | Status: ACTIVE | COMMUNITY
Start: 2025-07-22 | End: 1900-01-01

## 2025-08-15 ENCOUNTER — TRANSCRIPTION ENCOUNTER (OUTPATIENT)
Age: 65
End: 2025-08-15

## 2025-08-15 DIAGNOSIS — M60.80 OTHER MYOSITIS, UNSPECIFIED SITE: ICD-10-CM

## 2025-08-15 LAB
25(OH)D3 SERPL-MCNC: 22.1 NG/ML
ALBUMIN SERPL ELPH-MCNC: 4.2 G/DL
ALP BLD-CCNC: 60 U/L
ALT SERPL-CCNC: 70 U/L
ANION GAP SERPL CALC-SCNC: 16 MMOL/L
AST SERPL-CCNC: 46 U/L
BASOPHILS # BLD AUTO: 0.05 K/UL
BASOPHILS NFR BLD AUTO: 1 %
BILIRUB SERPL-MCNC: 0.6 MG/DL
BUN SERPL-MCNC: 11 MG/DL
CALCIUM SERPL-MCNC: 9.4 MG/DL
CHLORIDE SERPL-SCNC: 97 MMOL/L
CK SERPL-CCNC: 1458 U/L
CO2 SERPL-SCNC: 24 MMOL/L
CREAT SERPL-MCNC: 0.41 MG/DL
CRP SERPL-MCNC: <3 MG/L
EGFRCR SERPLBLD CKD-EPI 2021: 109 ML/MIN/1.73M2
EOSINOPHIL # BLD AUTO: 0.07 K/UL
EOSINOPHIL NFR BLD AUTO: 1.3 %
FERRITIN SERPL-MCNC: 166 NG/ML
HCT VFR BLD CALC: 42.1 %
HGB BLD-MCNC: 13.7 G/DL
IMM GRANULOCYTES NFR BLD AUTO: 0.2 %
LYMPHOCYTES # BLD AUTO: 1.55 K/UL
LYMPHOCYTES NFR BLD AUTO: 29.8 %
MAN DIFF?: NORMAL
MCHC RBC-ENTMCNC: 30.8 PG
MCHC RBC-ENTMCNC: 32.5 G/DL
MCV RBC AUTO: 94.6 FL
MONOCYTES # BLD AUTO: 0.36 K/UL
MONOCYTES NFR BLD AUTO: 6.9 %
MYOGLOBIN SERPL-MCNC: 264 NG/ML
NEUTROPHILS # BLD AUTO: 3.17 K/UL
NEUTROPHILS NFR BLD AUTO: 60.8 %
PLATELET # BLD AUTO: 295 K/UL
POTASSIUM SERPL-SCNC: 4.1 MMOL/L
PROT SERPL-MCNC: 7.6 G/DL
RBC # BLD: 4.45 M/UL
RBC # FLD: 15.4 %
SODIUM SERPL-SCNC: 137 MMOL/L
WBC # FLD AUTO: 5.21 K/UL

## 2025-08-16 LAB — ERYTHROCYTE [SEDIMENTATION RATE] IN BLOOD BY WESTERGREN METHOD: 39 MM/HR

## 2025-08-18 LAB
DEPRECATED KAPPA LC FREE/LAMBDA SER: 0.98 RATIO
IGA SERPL-MCNC: 206 MG/DL
IGG SERPL-MCNC: 1790 MG/DL
IGM SERPL-MCNC: 37 MG/DL
KAPPA LC CSF-MCNC: 1.01 MG/DL
KAPPA LC SERPL-MCNC: 0.99 MG/DL

## 2025-08-19 ENCOUNTER — TRANSCRIPTION ENCOUNTER (OUTPATIENT)
Age: 65
End: 2025-08-19

## 2025-08-20 ENCOUNTER — TRANSCRIPTION ENCOUNTER (OUTPATIENT)
Age: 65
End: 2025-08-20

## 2025-08-20 ENCOUNTER — RX RENEWAL (OUTPATIENT)
Age: 65
End: 2025-08-20

## 2025-09-10 ENCOUNTER — TRANSCRIPTION ENCOUNTER (OUTPATIENT)
Age: 65
End: 2025-09-10

## 2025-09-11 ENCOUNTER — TRANSCRIPTION ENCOUNTER (OUTPATIENT)
Age: 65
End: 2025-09-11

## 2025-09-19 LAB
CHOLEST SERPL-MCNC: 315 MG/DL
HDLC SERPL-MCNC: 86 MG/DL
LDLC SERPL-MCNC: 183 MG/DL
NONHDLC SERPL-MCNC: 229 MG/DL
TRIGL SERPL-MCNC: 242 MG/DL